# Patient Record
Sex: MALE
[De-identification: names, ages, dates, MRNs, and addresses within clinical notes are randomized per-mention and may not be internally consistent; named-entity substitution may affect disease eponyms.]

---

## 2022-07-30 ENCOUNTER — TELEPHONE ENCOUNTER (OUTPATIENT)
Age: 87
End: 2022-07-30

## 2022-07-31 ENCOUNTER — TELEPHONE ENCOUNTER (OUTPATIENT)
Age: 87
End: 2022-07-31

## 2023-09-03 PROBLEM — I10 ESSENTIAL HYPERTENSION: Status: ACTIVE | Noted: 2023-09-03

## 2023-09-03 PROBLEM — G47.00 INSOMNIA: Status: ACTIVE | Noted: 2023-09-03

## 2023-09-03 PROBLEM — F34.1 DYSTHYMIA: Status: ACTIVE | Noted: 2023-09-03

## 2023-09-03 PROBLEM — E55.9 VITAMIN D DEFICIENCY: Status: ACTIVE | Noted: 2023-09-03

## 2023-09-03 PROBLEM — H40.9 GLAUCOMA: Status: ACTIVE | Noted: 2023-09-03

## 2023-09-03 PROBLEM — D64.9 ANEMIA: Status: ACTIVE | Noted: 2023-09-03

## 2023-09-03 PROBLEM — G62.9 NEUROPATHY: Status: ACTIVE | Noted: 2023-09-03

## 2023-09-03 RX ORDER — AMLODIPINE BESYLATE 5 MG/1
2.5 TABLET ORAL DAILY
COMMUNITY
End: 2024-02-12 | Stop reason: ALTCHOICE

## 2023-09-03 RX ORDER — CHOLECALCIFEROL (VITAMIN D3) 50 MCG
2000 TABLET ORAL DAILY
COMMUNITY

## 2023-09-03 RX ORDER — LANOLIN ALCOHOL/MO/W.PET/CERES
1000 CREAM (GRAM) TOPICAL DAILY
COMMUNITY
Start: 2021-05-06

## 2023-09-03 RX ORDER — TIMOLOL MALEATE 5 MG/ML
SOLUTION/ DROPS OPHTHALMIC
COMMUNITY
Start: 2023-01-10

## 2023-09-03 RX ORDER — NAPROXEN SODIUM 220 MG/1
81 TABLET, FILM COATED ORAL
COMMUNITY
End: 2023-12-18 | Stop reason: ALTCHOICE

## 2023-12-11 ENCOUNTER — LAB (OUTPATIENT)
Dept: LAB | Facility: LAB | Age: 88
End: 2023-12-11
Payer: MEDICARE

## 2023-12-11 DIAGNOSIS — E53.8 DEFICIENCY OF OTHER SPECIFIED B GROUP VITAMINS: ICD-10-CM

## 2023-12-11 DIAGNOSIS — I10 ESSENTIAL (PRIMARY) HYPERTENSION: Primary | ICD-10-CM

## 2023-12-11 DIAGNOSIS — E55.9 VITAMIN D DEFICIENCY, UNSPECIFIED: ICD-10-CM

## 2023-12-11 LAB
25(OH)D3 SERPL-MCNC: 25 NG/ML (ref 31–100)
ALBUMIN SERPL-MCNC: 3.9 G/DL (ref 3.5–5)
ALP BLD-CCNC: 99 U/L (ref 35–125)
ALT SERPL-CCNC: 10 U/L (ref 5–40)
ANION GAP SERPL CALC-SCNC: 11 MMOL/L
AST SERPL-CCNC: 20 U/L (ref 5–40)
BASOPHILS # BLD AUTO: 0.09 X10*3/UL (ref 0–0.1)
BASOPHILS NFR BLD AUTO: 1.1 %
BILIRUB SERPL-MCNC: 1.4 MG/DL (ref 0.1–1.2)
BUN SERPL-MCNC: 16 MG/DL (ref 8–25)
CALCIUM SERPL-MCNC: 10.1 MG/DL (ref 8.5–10.4)
CHLORIDE SERPL-SCNC: 101 MMOL/L (ref 97–107)
CHOLEST SERPL-MCNC: 162 MG/DL (ref 133–200)
CHOLEST/HDLC SERPL: 2.7 {RATIO}
CO2 SERPL-SCNC: 28 MMOL/L (ref 24–31)
CREAT SERPL-MCNC: 0.8 MG/DL (ref 0.4–1.6)
EOSINOPHIL # BLD AUTO: 0.33 X10*3/UL (ref 0–0.4)
EOSINOPHIL NFR BLD AUTO: 4.1 %
ERYTHROCYTE [DISTWIDTH] IN BLOOD BY AUTOMATED COUNT: 13.9 % (ref 11.5–14.5)
GFR SERPL CREATININE-BSD FRML MDRD: 80 ML/MIN/1.73M*2
GLUCOSE SERPL-MCNC: 94 MG/DL (ref 65–99)
HCT VFR BLD AUTO: 48.4 % (ref 41–52)
HDLC SERPL-MCNC: 60 MG/DL
HGB BLD-MCNC: 15.7 G/DL (ref 13.5–17.5)
IMM GRANULOCYTES # BLD AUTO: 0.02 X10*3/UL (ref 0–0.5)
IMM GRANULOCYTES NFR BLD AUTO: 0.2 % (ref 0–0.9)
LDLC SERPL CALC-MCNC: 83 MG/DL (ref 65–130)
LYMPHOCYTES # BLD AUTO: 2.48 X10*3/UL (ref 0.8–3)
LYMPHOCYTES NFR BLD AUTO: 30.7 %
MCH RBC QN AUTO: 31.5 PG (ref 26–34)
MCHC RBC AUTO-ENTMCNC: 32.4 G/DL (ref 32–36)
MCV RBC AUTO: 97 FL (ref 80–100)
MONOCYTES # BLD AUTO: 0.94 X10*3/UL (ref 0.05–0.8)
MONOCYTES NFR BLD AUTO: 11.6 %
NEUTROPHILS # BLD AUTO: 4.23 X10*3/UL (ref 1.6–5.5)
NEUTROPHILS NFR BLD AUTO: 52.3 %
NRBC BLD-RTO: 0 /100 WBCS (ref 0–0)
PLATELET # BLD AUTO: 208 X10*3/UL (ref 150–450)
POTASSIUM SERPL-SCNC: 4.4 MMOL/L (ref 3.4–5.1)
PROT SERPL-MCNC: 6.8 G/DL (ref 5.9–7.9)
RBC # BLD AUTO: 4.98 X10*6/UL (ref 4.5–5.9)
SODIUM SERPL-SCNC: 140 MMOL/L (ref 133–145)
TRIGL SERPL-MCNC: 97 MG/DL (ref 40–150)
TSH SERPL DL<=0.05 MIU/L-ACNC: 3.05 MIU/L (ref 0.27–4.2)
VIT B12 SERPL-MCNC: 867 PG/ML (ref 211–946)
WBC # BLD AUTO: 8.1 X10*3/UL (ref 4.4–11.3)

## 2023-12-11 PROCEDURE — 36415 COLL VENOUS BLD VENIPUNCTURE: CPT

## 2023-12-11 PROCEDURE — 82306 VITAMIN D 25 HYDROXY: CPT

## 2023-12-11 PROCEDURE — 85025 COMPLETE CBC W/AUTO DIFF WBC: CPT

## 2023-12-11 PROCEDURE — 80061 LIPID PANEL: CPT

## 2023-12-11 PROCEDURE — 80053 COMPREHEN METABOLIC PANEL: CPT

## 2023-12-11 PROCEDURE — 82607 VITAMIN B-12: CPT

## 2023-12-11 PROCEDURE — 84443 ASSAY THYROID STIM HORMONE: CPT

## 2023-12-14 ASSESSMENT — ENCOUNTER SYMPTOMS
SHORTNESS OF BREATH: 0
NAUSEA: 0
APPETITE CHANGE: 0
CHILLS: 0
FEVER: 0
HEADACHES: 0
COUGH: 0
DIARRHEA: 0
VOMITING: 0
ABDOMINAL PAIN: 0

## 2023-12-15 NOTE — PROGRESS NOTES
[facility]: [facility]  PROGRESS NOTE      Kolton Acosta is a 89 y.o. male that is presenting today for No chief complaint on file..    Assessment/Plan   Diagnoses and all orders for this visit:  Essential hypertension  Insomnia, unspecified type  Neuropathy  Vitamin D deficiency  Vitamin B12 deficiency    HR may be too low given his age but he seems totally asymptomatic so will not change meds recommenced by cardiology.  He does need to get the echo done and that is scheduled for January.  No issues with the AC at this time.    Subjective   HPI  89 y.o. male here for follow up.  Went into AF after getting vaccines, admitted at [facility].  Anticoagulated on the Eliquis.  On metoprolol with HR in 50s or 40s and asymptomatic, amlodipine reduced.  Followed up with cardiology and has echo ordered in January.  Feels fine without symptoms of angina nor CHF.  No dizziness or lightheadedness.    Review of Systems   Constitutional:  Negative for appetite change, chills and fever.   Respiratory:  Negative for cough and shortness of breath.    Cardiovascular:  Negative for chest pain.   Gastrointestinal:  Negative for abdominal pain, diarrhea, nausea and vomiting.   Neurological:  Negative for headaches.   All other systems reviewed and are negative.     Objective   There were no vitals filed for this visit.   There is no height or weight on file to calculate BMI.  Physical Exam  Vitals reviewed.   Constitutional:       Appearance: Normal appearance.   Cardiovascular:      Rate and Rhythm: Bradycardia present. Rhythm irregular.      Heart sounds: No murmur heard.  Pulmonary:      Breath sounds: Normal breath sounds. No wheezing, rhonchi or rales.   Musculoskeletal:      Right lower leg: No edema.      Left lower leg: No edema.       Diagnostic Results   Lab Results   Component Value Date    GLUCOSE 94 12/11/2023    CALCIUM 10.1 12/11/2023     12/11/2023    K 4.4 12/11/2023    CO2 28 12/11/2023      "12/11/2023    BUN 16 12/11/2023    CREATININE 0.80 12/11/2023     Lab Results   Component Value Date    ALT 10 12/11/2023    AST 20 12/11/2023    ALKPHOS 99 12/11/2023    BILITOT 1.4 (H) 12/11/2023     Lab Results   Component Value Date    WBC 8.1 12/11/2023    HGB 15.7 12/11/2023    HCT 48.4 12/11/2023    MCV 97 12/11/2023     12/11/2023     Lab Results   Component Value Date    CHOL 162 12/11/2023    CHOL 168 11/09/2022    CHOL 197 11/04/2021     Lab Results   Component Value Date    HDL 60.0 12/11/2023    HDL 53 11/09/2022    HDL 52 11/04/2021     Lab Results   Component Value Date    LDLCALC 83 12/11/2023    LDLCALC 94 11/09/2022    LDLCALC 120 11/04/2021     Lab Results   Component Value Date    TRIG 97 12/11/2023    TRIG 106 11/09/2022    TRIG 125 11/04/2021     No components found for: \"CHOLHDL\"  Lab Results   Component Value Date    HGBA1C 5.6 08/13/2019     Other labs not included in the list above were reviewed either before or during this encounter.    History    No past medical history on file.  No past surgical history on file.  Family History   Problem Relation Name Age of Onset    Cancer Mother      Colon cancer Mother      Heart attack Father       Social History     Socioeconomic History    Marital status:      Spouse name: Not on file    Number of children: Not on file    Years of education: Not on file    Highest education level: Not on file   Occupational History    Not on file   Tobacco Use    Smoking status: Not on file    Smokeless tobacco: Not on file   Substance and Sexual Activity    Alcohol use: Not on file    Drug use: Not on file    Sexual activity: Not on file   Other Topics Concern    Not on file   Social History Narrative    Not on file     Social Determinants of Health     Financial Resource Strain: Not on file   Food Insecurity: Not on file   Transportation Needs: Not on file   Physical Activity: Not on file   Stress: Not on file   Social Connections: Not on file "   Intimate Partner Violence: Not on file   Housing Stability: Not on file     Not on File  Current Outpatient Medications on File Prior to Visit   Medication Sig Dispense Refill    amLODIPine (Norvasc) 5 mg tablet Take 1 tablet (5 mg) by mouth once daily.      aspirin 81 mg chewable tablet Chew 1 tablet (81 mg). Three days per week      cholecalciferol (Vitamin D-3) 50 MCG (2000 UT) tablet Take 1 tablet (2,000 Units) by mouth once daily.      cyanocobalamin (Vitamin B-12) 1,000 mcg tablet Take 1 tablet (1,000 mcg) by mouth once daily.      timolol (Timoptic) 0.5 % ophthalmic solution        No current facility-administered medications on file prior to visit.     Immunization History   Administered Date(s) Administered    Flu vaccine, quadrivalent, high-dose, preservative free, age 65y+ (FLUZONE) 10/29/2020, 09/26/2021    Influenza, High Dose Seasonal, Preservative Free 11/18/2015, 11/10/2016, 10/30/2017, 10/24/2018, 10/14/2019    Influenza, Seasonal, Quadrivalent, Adjuvanted 11/01/2022    Influenza, seasonal, injectable 09/26/2014    Moderna SARS-CoV-2 Vaccination 01/20/2021, 02/17/2021    Pfizer COVID-19 vaccine, bivalent, age 12 years and older (30 mcg/0.3 mL) 12/14/2022    Pfizer Purple Cap SARS-CoV-2 11/01/2021    Pneumococcal conjugate vaccine, 13-valent (PREVNAR 13) 08/24/2015    Pneumococcal polysaccharide vaccine, 23-valent, age 2 years and older (PNEUMOVAX 23) 08/12/2014     Patient's medical history was reviewed and updated either before or during this encounter.       Ajay Griffith MD

## 2023-12-18 ENCOUNTER — OFFICE VISIT (OUTPATIENT)
Dept: PRIMARY CARE | Facility: CLINIC | Age: 88
End: 2023-12-18
Payer: MEDICARE

## 2023-12-18 VITALS
SYSTOLIC BLOOD PRESSURE: 122 MMHG | HEART RATE: 45 BPM | OXYGEN SATURATION: 93 % | TEMPERATURE: 97.2 F | WEIGHT: 162 LBS | DIASTOLIC BLOOD PRESSURE: 60 MMHG | BODY MASS INDEX: 22.59 KG/M2

## 2023-12-18 DIAGNOSIS — G47.00 INSOMNIA, UNSPECIFIED TYPE: ICD-10-CM

## 2023-12-18 DIAGNOSIS — I10 ESSENTIAL HYPERTENSION: Primary | ICD-10-CM

## 2023-12-18 DIAGNOSIS — E55.9 VITAMIN D DEFICIENCY: ICD-10-CM

## 2023-12-18 DIAGNOSIS — E53.8 VITAMIN B12 DEFICIENCY: ICD-10-CM

## 2023-12-18 DIAGNOSIS — G62.9 NEUROPATHY: ICD-10-CM

## 2023-12-18 PROCEDURE — 3074F SYST BP LT 130 MM HG: CPT | Performed by: INTERNAL MEDICINE

## 2023-12-18 PROCEDURE — 99214 OFFICE O/P EST MOD 30 MIN: CPT | Performed by: INTERNAL MEDICINE

## 2023-12-18 PROCEDURE — 1159F MED LIST DOCD IN RCRD: CPT | Performed by: INTERNAL MEDICINE

## 2023-12-18 PROCEDURE — 1125F AMNT PAIN NOTED PAIN PRSNT: CPT | Performed by: INTERNAL MEDICINE

## 2023-12-18 PROCEDURE — 3078F DIAST BP <80 MM HG: CPT | Performed by: INTERNAL MEDICINE

## 2023-12-18 PROCEDURE — 1036F TOBACCO NON-USER: CPT | Performed by: INTERNAL MEDICINE

## 2023-12-18 PROCEDURE — 1160F RVW MEDS BY RX/DR IN RCRD: CPT | Performed by: INTERNAL MEDICINE

## 2023-12-18 RX ORDER — METOPROLOL SUCCINATE 25 MG/1
12.5 TABLET, EXTENDED RELEASE ORAL
COMMUNITY

## 2023-12-18 ASSESSMENT — ENCOUNTER SYMPTOMS
LOSS OF SENSATION IN FEET: 0
DEPRESSION: 0
OCCASIONAL FEELINGS OF UNSTEADINESS: 1

## 2023-12-18 ASSESSMENT — PAIN SCALES - GENERAL: PAINLEVEL: 7

## 2023-12-18 ASSESSMENT — PATIENT HEALTH QUESTIONNAIRE - PHQ9
SUM OF ALL RESPONSES TO PHQ9 QUESTIONS 1 AND 2: 0
2. FEELING DOWN, DEPRESSED OR HOPELESS: NOT AT ALL
1. LITTLE INTEREST OR PLEASURE IN DOING THINGS: NOT AT ALL

## 2024-02-02 ENCOUNTER — OFFICE VISIT (OUTPATIENT)
Dept: PRIMARY CARE | Facility: CLINIC | Age: 89
End: 2024-02-02
Payer: MEDICARE

## 2024-02-02 VITALS
OXYGEN SATURATION: 95 % | SYSTOLIC BLOOD PRESSURE: 124 MMHG | WEIGHT: 159 LBS | DIASTOLIC BLOOD PRESSURE: 80 MMHG | BODY MASS INDEX: 22.18 KG/M2 | HEART RATE: 48 BPM

## 2024-02-02 DIAGNOSIS — I48.92 ATRIAL FIBRILLATION AND FLUTTER (MULTI): ICD-10-CM

## 2024-02-02 DIAGNOSIS — I48.91 ATRIAL FIBRILLATION AND FLUTTER (MULTI): ICD-10-CM

## 2024-02-02 DIAGNOSIS — L03.213 PRESEPTAL CELLULITIS: Primary | ICD-10-CM

## 2024-02-02 PROCEDURE — 99214 OFFICE O/P EST MOD 30 MIN: CPT | Performed by: INTERNAL MEDICINE

## 2024-02-02 PROCEDURE — 1036F TOBACCO NON-USER: CPT | Performed by: INTERNAL MEDICINE

## 2024-02-02 PROCEDURE — 1126F AMNT PAIN NOTED NONE PRSNT: CPT | Performed by: INTERNAL MEDICINE

## 2024-02-02 PROCEDURE — 3074F SYST BP LT 130 MM HG: CPT | Performed by: INTERNAL MEDICINE

## 2024-02-02 PROCEDURE — 1159F MED LIST DOCD IN RCRD: CPT | Performed by: INTERNAL MEDICINE

## 2024-02-02 PROCEDURE — 3079F DIAST BP 80-89 MM HG: CPT | Performed by: INTERNAL MEDICINE

## 2024-02-02 RX ORDER — AMOXICILLIN AND CLAVULANATE POTASSIUM 875; 125 MG/1; MG/1
1 TABLET, FILM COATED ORAL 2 TIMES DAILY
Qty: 20 TABLET | Refills: 0 | Status: SHIPPED | OUTPATIENT
Start: 2024-02-02 | End: 2024-02-12

## 2024-02-02 RX ORDER — TOBRAMYCIN 3 MG/ML
1 SOLUTION/ DROPS OPHTHALMIC 4 TIMES DAILY
Qty: 5 ML | Refills: 0 | Status: SHIPPED | OUTPATIENT
Start: 2024-02-02 | End: 2024-02-16

## 2024-02-02 ASSESSMENT — PAIN SCALES - GENERAL: PAINLEVEL: 0-NO PAIN

## 2024-02-02 ASSESSMENT — PATIENT HEALTH QUESTIONNAIRE - PHQ9
2. FEELING DOWN, DEPRESSED OR HOPELESS: NOT AT ALL
1. LITTLE INTEREST OR PLEASURE IN DOING THINGS: NOT AT ALL
SUM OF ALL RESPONSES TO PHQ9 QUESTIONS 1 AND 2: 0

## 2024-02-02 ASSESSMENT — ENCOUNTER SYMPTOMS
ABDOMINAL PAIN: 0
OCCASIONAL FEELINGS OF UNSTEADINESS: 1
DEPRESSION: 0
LOSS OF SENSATION IN FEET: 0
SHORTNESS OF BREATH: 0
FEVER: 0

## 2024-02-02 NOTE — PROGRESS NOTES
[facility]: [facility]  PROGRESS NOTE      Kolton Acosta is a 89 y.o. male that is presenting today for eye complaint.    Assessment/Plan   Diagnoses and all orders for this visit:  Preseptal cellulitis  -     tobramycin (Tobrex) 0.3 % ophthalmic solution; Administer 1 drop into the left eye 4 times a day for 14 days.  -     amoxicillin-pot clavulanate (Augmentin) 875-125 mg tablet; Take 1 tablet (875 mg) by mouth 2 times a day for 10 days.  Atrial fibrillation and flutter (CMS/HCC)    Will treat as a preseptal cellulitis without any signs to suggest orbital or postseptal pathology on exam or by history.  This has been slowly worsening over the past week, will start antibiotics I did discuss reasons that they need to seek care in the emergency department for CT scan.  Otherwise I will see him back in 1 week.    Subjective   HPI  Over the past week on the left cheek just under the eye he has had an area that has become scabbed and is draining.  Also there is some discharge from the eye.  He has no pain with eye movement and no visual disturbance at all.    Review of Systems   Constitutional:  Negative for fever.   Respiratory:  Negative for shortness of breath.    Cardiovascular:  Negative for chest pain.   Gastrointestinal:  Negative for abdominal pain.   All other systems reviewed and are negative.     Objective   Vitals:    02/02/24 1055   BP: 124/80   Pulse: (!) 48   SpO2: 95%      Body mass index is 22.18 kg/m².  Physical Exam  Vitals reviewed.   Constitutional:       Appearance: Normal appearance.   Eyes:     Cardiovascular:      Rate and Rhythm: Normal rate and regular rhythm.      Heart sounds: No murmur heard.  Pulmonary:      Breath sounds: Normal breath sounds. No wheezing, rhonchi or rales.   Musculoskeletal:      Right lower leg: No edema.      Left lower leg: No edema.       Just inferior and lateral to the left lower eyelid there is a scabbed area with some minor purulence, no  "eb erythema or cellulitis.  Extraocular movements appear to be intact, there is no pain.  The eye is not injected.  The vision is normal.    Diagnostic Results   Lab Results   Component Value Date    GLUCOSE 94 12/11/2023    CALCIUM 10.1 12/11/2023     12/11/2023    K 4.4 12/11/2023    CO2 28 12/11/2023     12/11/2023    BUN 16 12/11/2023    CREATININE 0.80 12/11/2023     Lab Results   Component Value Date    ALT 10 12/11/2023    AST 20 12/11/2023    ALKPHOS 99 12/11/2023    BILITOT 1.4 (H) 12/11/2023     Lab Results   Component Value Date    WBC 8.1 12/11/2023    HGB 15.7 12/11/2023    HCT 48.4 12/11/2023    MCV 97 12/11/2023     12/11/2023     Lab Results   Component Value Date    CHOL 162 12/11/2023    CHOL 168 11/09/2022    CHOL 197 11/04/2021     Lab Results   Component Value Date    HDL 60.0 12/11/2023    HDL 53 11/09/2022    HDL 52 11/04/2021     Lab Results   Component Value Date    LDLCALC 83 12/11/2023    LDLCALC 94 11/09/2022    LDLCALC 120 11/04/2021     Lab Results   Component Value Date    TRIG 97 12/11/2023    TRIG 106 11/09/2022    TRIG 125 11/04/2021     No components found for: \"CHOLHDL\"  Lab Results   Component Value Date    HGBA1C 5.6 08/13/2019     Other labs not included in the list above were reviewed either before or during this encounter.    History    Past Medical History:   Diagnosis Date    A-fib (CMS/HCC)      Past Surgical History:   Procedure Laterality Date    WISDOM TOOTH EXTRACTION  01/2024    12 teeth removed     Family History   Problem Relation Name Age of Onset    Cancer Mother      Colon cancer Mother      Heart attack Father       Social History     Socioeconomic History    Marital status:      Spouse name: Not on file    Number of children: Not on file    Years of education: Not on file    Highest education level: Not on file   Occupational History    Not on file   Tobacco Use    Smoking status: Never    Smokeless tobacco: Never   Vaping Use    " Vaping Use: Never used   Substance and Sexual Activity    Alcohol use: Never    Drug use: Never    Sexual activity: Not on file   Other Topics Concern    Not on file   Social History Narrative    Not on file     Social Determinants of Health     Financial Resource Strain: Not on file   Food Insecurity: Not on file   Transportation Needs: Not on file   Physical Activity: Not on file   Stress: Not on file   Social Connections: Not on file   Intimate Partner Violence: Not on file   Housing Stability: Not on file     No Known Allergies  Current Outpatient Medications on File Prior to Visit   Medication Sig Dispense Refill    amLODIPine (Norvasc) 5 mg tablet Take 0.5 tablets (2.5 mg) by mouth once daily.      apixaban (Eliquis) 5 mg tablet Take 1 tablet (5 mg) by mouth 2 times a day.      cholecalciferol (Vitamin D-3) 50 MCG (2000 UT) tablet Take 1 tablet (2,000 Units) by mouth once daily.      cyanocobalamin (Vitamin B-12) 1,000 mcg tablet Take 1 tablet (1,000 mcg) by mouth once daily.      metoprolol succinate XL (Toprol-XL) 25 mg 24 hr tablet Take 0.5 tablets (12.5 mg) by mouth. Only given if blood pressure top number is not less than 100 and heart rate not less than 50      timolol (Timoptic) 0.5 % ophthalmic solution        No current facility-administered medications on file prior to visit.     Immunization History   Administered Date(s) Administered    Flu vaccine, quadrivalent, high-dose, preservative free, age 65y+ (FLUZONE) 10/29/2020, 09/26/2021, 10/28/2023    Influenza, High Dose Seasonal, Preservative Free 11/18/2015, 11/10/2016, 10/30/2017, 10/24/2018, 10/14/2019    Influenza, Seasonal, Quadrivalent, Adjuvanted 11/01/2022    Influenza, seasonal, injectable 09/26/2014    Moderna SARS-CoV-2 Vaccination 01/20/2021, 02/17/2021    Pfizer COVID-19 vaccine, Fall 2023, 12 years and older, (30mcg/0.3mL) 10/28/2023    Pfizer COVID-19 vaccine, bivalent, age 12 years and older (30 mcg/0.3 mL) 12/14/2022    Pfizer  Purple Cap SARS-CoV-2 11/01/2021    Pneumococcal conjugate vaccine, 13-valent (PREVNAR 13) 08/24/2015    Pneumococcal polysaccharide vaccine, 23-valent, age 2 years and older (PNEUMOVAX 23) 08/12/2014     Patient's medical history was reviewed and updated either before or during this encounter.       Ajay Griffith MD

## 2024-02-09 PROBLEM — I10 PRIMARY HYPERTENSION: Status: ACTIVE | Noted: 2022-11-09

## 2024-02-09 PROBLEM — R94.5 ABNORMAL RESULTS OF LIVER FUNCTION STUDIES: Status: ACTIVE | Noted: 2024-02-09

## 2024-02-09 PROBLEM — L03.213 PRESEPTAL CELLULITIS: Status: ACTIVE | Noted: 2024-02-09

## 2024-02-09 PROBLEM — G47.00 INSOMNIA, UNSPECIFIED: Status: ACTIVE | Noted: 2024-02-09

## 2024-02-09 PROBLEM — G62.9 POLYNEUROPATHY, UNSPECIFIED: Status: ACTIVE | Noted: 2024-02-09

## 2024-02-09 PROBLEM — E53.8 COBALAMIN DEFICIENCY: Status: ACTIVE | Noted: 2022-11-09

## 2024-02-12 ENCOUNTER — OFFICE VISIT (OUTPATIENT)
Dept: PRIMARY CARE | Facility: CLINIC | Age: 89
End: 2024-02-12
Payer: MEDICARE

## 2024-02-12 VITALS
TEMPERATURE: 96.1 F | OXYGEN SATURATION: 97 % | HEIGHT: 71 IN | DIASTOLIC BLOOD PRESSURE: 74 MMHG | HEART RATE: 59 BPM | SYSTOLIC BLOOD PRESSURE: 130 MMHG | WEIGHT: 163 LBS | BODY MASS INDEX: 22.82 KG/M2

## 2024-02-12 DIAGNOSIS — I48.92 ATRIAL FIBRILLATION AND FLUTTER (MULTI): ICD-10-CM

## 2024-02-12 DIAGNOSIS — I48.91 ATRIAL FIBRILLATION AND FLUTTER (MULTI): ICD-10-CM

## 2024-02-12 DIAGNOSIS — I10 ESSENTIAL HYPERTENSION: ICD-10-CM

## 2024-02-12 DIAGNOSIS — L03.213 PRESEPTAL CELLULITIS: Primary | ICD-10-CM

## 2024-02-12 PROCEDURE — 1036F TOBACCO NON-USER: CPT | Performed by: INTERNAL MEDICINE

## 2024-02-12 PROCEDURE — 99213 OFFICE O/P EST LOW 20 MIN: CPT | Performed by: INTERNAL MEDICINE

## 2024-02-12 PROCEDURE — 1126F AMNT PAIN NOTED NONE PRSNT: CPT | Performed by: INTERNAL MEDICINE

## 2024-02-12 PROCEDURE — 3078F DIAST BP <80 MM HG: CPT | Performed by: INTERNAL MEDICINE

## 2024-02-12 PROCEDURE — 1159F MED LIST DOCD IN RCRD: CPT | Performed by: INTERNAL MEDICINE

## 2024-02-12 PROCEDURE — 3075F SYST BP GE 130 - 139MM HG: CPT | Performed by: INTERNAL MEDICINE

## 2024-02-12 ASSESSMENT — ENCOUNTER SYMPTOMS
FEVER: 0
ABDOMINAL PAIN: 0
LOSS OF SENSATION IN FEET: 0
OCCASIONAL FEELINGS OF UNSTEADINESS: 1
SHORTNESS OF BREATH: 0
DEPRESSION: 0

## 2024-02-12 ASSESSMENT — LIFESTYLE VARIABLES
HOW OFTEN DURING THE LAST YEAR HAVE YOU FOUND THAT YOU WERE NOT ABLE TO STOP DRINKING ONCE YOU HAD STARTED: NEVER
HAS A RELATIVE, FRIEND, DOCTOR, OR ANOTHER HEALTH PROFESSIONAL EXPRESSED CONCERN ABOUT YOUR DRINKING OR SUGGESTED YOU CUT DOWN: NO
AUDIT-C TOTAL SCORE: 0
HOW OFTEN DURING THE LAST YEAR HAVE YOU NEEDED AN ALCOHOLIC DRINK FIRST THING IN THE MORNING TO GET YOURSELF GOING AFTER A NIGHT OF HEAVY DRINKING: NEVER
HOW OFTEN DURING THE LAST YEAR HAVE YOU BEEN UNABLE TO REMEMBER WHAT HAPPENED THE NIGHT BEFORE BECAUSE YOU HAD BEEN DRINKING: NEVER
HOW OFTEN DO YOU HAVE A DRINK CONTAINING ALCOHOL: NEVER
SKIP TO QUESTIONS 9-10: 1
HOW MANY STANDARD DRINKS CONTAINING ALCOHOL DO YOU HAVE ON A TYPICAL DAY: PATIENT DOES NOT DRINK
HAVE YOU OR SOMEONE ELSE BEEN INJURED AS A RESULT OF YOUR DRINKING: NO
HOW OFTEN DO YOU HAVE SIX OR MORE DRINKS ON ONE OCCASION: NEVER
HOW OFTEN DURING THE LAST YEAR HAVE YOU HAD A FEELING OF GUILT OR REMORSE AFTER DRINKING: NEVER
HOW OFTEN DURING THE LAST YEAR HAVE YOU FAILED TO DO WHAT WAS NORMALLY EXPECTED FROM YOU BECAUSE OF DRINKING: NEVER
AUDIT TOTAL SCORE: 0

## 2024-02-12 ASSESSMENT — PAIN SCALES - GENERAL: PAINLEVEL: 0-NO PAIN

## 2024-02-12 NOTE — PROGRESS NOTES
Yeah if I routed I did [facility]: [facility]  PROGRESS NOTE      Kolton Acosta is a 89 y.o. male that is presenting today for Follow-up.    Assessment/Plan   Diagnoses and all orders for this visit:  Preseptal cellulitis  Atrial fibrillation and flutter (CMS/HCC)  Essential hypertension    Subjective   HPI  Following up on the left facial cellulitis which has improved greatly.    Review of Systems   Constitutional:  Negative for fever.   Respiratory:  Negative for shortness of breath.    Cardiovascular:  Negative for chest pain.   Gastrointestinal:  Negative for abdominal pain.   All other systems reviewed and are negative.     Objective   Vitals:    02/12/24 1521   BP: 130/74   Pulse: 59   Temp: 35.6 °C (96.1 °F)   SpO2: 97%      Body mass index is 22.73 kg/m².  Physical Exam  The left face and eye is completely clear of infection the conjunctive is clear as well.  No pain with extraocular movements.    Diagnostic Results   Lab Results   Component Value Date    GLUCOSE 94 12/11/2023    CALCIUM 10.1 12/11/2023     12/11/2023    K 4.4 12/11/2023    CO2 28 12/11/2023     12/11/2023    BUN 16 12/11/2023    CREATININE 0.80 12/11/2023     Lab Results   Component Value Date    ALT 10 12/11/2023    AST 20 12/11/2023    ALKPHOS 99 12/11/2023    BILITOT 1.4 (H) 12/11/2023     Lab Results   Component Value Date    WBC 8.1 12/11/2023    HGB 15.7 12/11/2023    HCT 48.4 12/11/2023    MCV 97 12/11/2023     12/11/2023     Lab Results   Component Value Date    CHOL 162 12/11/2023    CHOL 168 11/09/2022    CHOL 197 11/04/2021     Lab Results   Component Value Date    HDL 60.0 12/11/2023    HDL 53 11/09/2022    HDL 52 11/04/2021     Lab Results   Component Value Date    LDLCALC 83 12/11/2023    LDLCALC 94 11/09/2022    LDLCALC 120 11/04/2021     Lab Results   Component Value Date    TRIG 97 12/11/2023    TRIG 106 11/09/2022    TRIG 125 11/04/2021     No components found for:  "\"CHOLHDL\"  Lab Results   Component Value Date    HGBA1C 5.6 08/13/2019     Other labs not included in the list above were reviewed either before or during this encounter.    History    Past Medical History:   Diagnosis Date    A-fib (CMS/HCC)      Past Surgical History:   Procedure Laterality Date    WISDOM TOOTH EXTRACTION  01/2024    12 teeth removed     Family History   Problem Relation Name Age of Onset    Cancer Mother      Colon cancer Mother      Heart attack Father       Social History     Socioeconomic History    Marital status:      Spouse name: Not on file    Number of children: Not on file    Years of education: Not on file    Highest education level: Not on file   Occupational History    Not on file   Tobacco Use    Smoking status: Never    Smokeless tobacco: Never   Vaping Use    Vaping Use: Never used   Substance and Sexual Activity    Alcohol use: Never    Drug use: Never    Sexual activity: Not on file   Other Topics Concern    Not on file   Social History Narrative    Not on file     Social Determinants of Health     Financial Resource Strain: Not on file   Food Insecurity: Not on file   Transportation Needs: Not on file   Physical Activity: Not on file   Stress: Not on file   Social Connections: Not on file   Intimate Partner Violence: Not on file   Housing Stability: Not on file     No Known Allergies  Current Outpatient Medications on File Prior to Visit   Medication Sig Dispense Refill    amoxicillin-pot clavulanate (Augmentin) 875-125 mg tablet Take 1 tablet (875 mg) by mouth 2 times a day for 10 days. 20 tablet 0    apixaban (Eliquis) 5 mg tablet Take 1 tablet (5 mg) by mouth 2 times a day.      cholecalciferol (Vitamin D-3) 50 MCG (2000 UT) tablet Take 1 tablet (2,000 Units) by mouth once daily.      cyanocobalamin (Vitamin B-12) 1,000 mcg tablet Take 1 tablet (1,000 mcg) by mouth once daily.      metoprolol succinate XL (Toprol-XL) 25 mg 24 hr tablet Take 0.5 tablets (12.5 mg) by " mouth. Only given if blood pressure top number is not less than 100 and heart rate not less than 50      timolol (Timoptic) 0.5 % ophthalmic solution       tobramycin (Tobrex) 0.3 % ophthalmic solution Administer 1 drop into the left eye 4 times a day for 14 days. 5 mL 0    [DISCONTINUED] amLODIPine (Norvasc) 5 mg tablet Take 0.5 tablets (2.5 mg) by mouth once daily.       No current facility-administered medications on file prior to visit.     Immunization History   Administered Date(s) Administered    Flu vaccine, quadrivalent, high-dose, preservative free, age 65y+ (FLUZONE) 10/29/2020, 09/26/2021, 10/28/2023    Influenza, High Dose Seasonal, Preservative Free 11/18/2015, 11/10/2016, 10/30/2017, 10/24/2018, 10/14/2019    Influenza, Seasonal, Quadrivalent, Adjuvanted 11/01/2022    Influenza, seasonal, injectable 09/26/2014    Moderna SARS-CoV-2 Vaccination 01/20/2021, 02/17/2021    Pfizer COVID-19 vaccine, Fall 2023, 12 years and older, (30mcg/0.3mL) 10/28/2023    Pfizer COVID-19 vaccine, bivalent, age 12 years and older (30 mcg/0.3 mL) 12/14/2022    Pfizer Purple Cap SARS-CoV-2 11/01/2021    Pneumococcal conjugate vaccine, 13-valent (PREVNAR 13) 08/24/2015    Pneumococcal polysaccharide vaccine, 23-valent, age 2 years and older (PNEUMOVAX 23) 08/12/2014     Patient's medical history was reviewed and updated either before or during this encounter.       Ajay Griffith MD

## 2024-02-19 ENCOUNTER — APPOINTMENT (OUTPATIENT)
Dept: PRIMARY CARE | Facility: CLINIC | Age: 89
End: 2024-02-19
Payer: MEDICARE

## 2024-04-17 ENCOUNTER — DOCUMENTATION (OUTPATIENT)
Dept: PRIMARY CARE | Facility: CLINIC | Age: 89
End: 2024-04-17
Payer: MEDICARE

## 2024-04-17 ENCOUNTER — PATIENT OUTREACH (OUTPATIENT)
Dept: PRIMARY CARE | Facility: CLINIC | Age: 89
End: 2024-04-17
Payer: MEDICARE

## 2024-04-17 NOTE — PROGRESS NOTES
Discharge Facility: [facility]     Discharge Diagnosis:    Syncope and collapse - Primary    Fall, initial encounter    Hypomagnesemia   Disorders of magnesium metabolism    Elevated troponin   Other abnormal blood chemistry    Generalized weakness   Other malaise and fatigue    Near syncope   Syncope and collapse    Syncope and collapse    Paroxysmal atrial fibrillation (HCC)   Atrial fibrillation    Advanced care planning/counseling discussion   Other specified counseling    Atrial fibrillation (HCC)   Atrial fibrillation    Essential hypertension   Unspecified essential hypertension    Nonrheumatic aortic valve stenosis   Aortic valve disorders    Age-related osteoporosis with current pathol fracture of left femur, with routine healing, subsequent encounter      Admission Date: 4/14/2024   Discharge Date:  4/16/2024     PCP Appointment Date: Tasked to office     Specialist Appointment Date:     05/16/2024 9:00 AM  Cardio STANLEY SEE [facility] [phone]  06/13/2024 3:00 PM  Cardio PARVIN HARRIS [facility] [phone]    You will be discharged with a ZIO patch prior to discharge    Follow up with Dr Harris to discuss outpatient TAVR    Hospital Encounter and Summary: Linked     See discharge assessment below for further details     Medications  Medications reviewed with patient/caregiver?: Yes (4/17/2024 10:05 AM)  Is the patient having any side effects they believe may be caused by any medication additions or changes?: No (4/17/2024 10:05 AM)  Does the patient have all medications ordered at discharge?: Yes (4/17/2024 10:05 AM)  Prescription Comments: meds reviewed DTR Ki ,  ,, metoprolol succinate ER (TOPROL XL) 25 mg 24 hr tablet   Indications: Atrial fibrillation, unspecified type (HCC) Take 0.5 tablets by mouth daily with dinner. Hold for heart rate less than 50   45 tablet   3 11/27/2023 11/26/2024  apixaban (ELIQUIS) 5 mg tab(s)   Take 1 tablet by mouth two times a day.   180 tablet   3 10/31/2023  10/30/2024  cyanocobalamin (VITAMIN B-12) 1,000 mcg tab   Take 1,000 mcg by mouth once daily.   0      timolol 0.5 % ophthalmic gel-forming   Use 2 Drops in both eyes once daily.   0      Cholecalciferol, Vitamin D3, (VITAMIN D) 1,000 unit tab   Take 2,000 Units by mouth once daily. (4/17/2024 10:05 AM)  Is the patient taking all medications as directed (includes completed medication regime)?: Yes (4/17/2024 10:05 AM)  Care Management Interventions: Provided patient education (4/17/2024 10:05 AM)  Medication Comments: DTR Ki aware of DC Amlodipine assists Kolton with V/S monitoring prior to adminster meds as directed (4/17/2024 10:05 AM)    Appointments  Does the patient have a primary care provider?: Yes (4/17/2024 10:05 AM)  Care Management Interventions: Educated patient on importance of making appointment (Tasekd to office no available) (4/17/2024 10:05 AM)  Care Management Interventions: Advised patient to keep appointment (05/16/2024 9:00 AM  Cardio STANLEY SEE [facility] [phone] 06/13/2024 3:00 PM  Cardio PARVIN PIZARRO [facility] [phone]) (4/17/2024 10:05 AM)    Self Management  What is the home health agency?: NA (4/17/2024 10:05 AM)  What Durable Medical Equipment (DME) was ordered?: NA (4/17/2024 10:05 AM)    Patient Teaching  Does the patient have access to their discharge instructions?: Yes (4/17/2024 10:05 AM)  Care Management Interventions: Reviewed instructions with patient (4/17/2024 10:05 AM)  What is the patient's perception of their health status since discharge?: Improving (4/17/2024 10:05 AM)  Is the patient/caregiver able to teach back the hierarchy of who to call/visit for symptoms/problems? PCP, Specialist, Home Health nurse, Urgent Care, ED, 911: Yes (4/17/2024 10:05 AM)  Patient/Caregiver Education Comments: DTR Ki assists as needed, patient lives alone , will go to out patient therapy. DTR KI aware to call provider for any questions concerns or change in condition (4/17/2024 10:05  AM)

## 2024-04-25 ENCOUNTER — OFFICE VISIT (OUTPATIENT)
Dept: PRIMARY CARE | Facility: CLINIC | Age: 89
End: 2024-04-25
Payer: MEDICARE

## 2024-04-25 VITALS
OXYGEN SATURATION: 97 % | SYSTOLIC BLOOD PRESSURE: 122 MMHG | DIASTOLIC BLOOD PRESSURE: 80 MMHG | WEIGHT: 163 LBS | HEART RATE: 53 BPM | BODY MASS INDEX: 22.82 KG/M2 | TEMPERATURE: 97.2 F | HEIGHT: 71 IN

## 2024-04-25 DIAGNOSIS — I48.91 ATRIAL FIBRILLATION, UNSPECIFIED TYPE (MULTI): Primary | ICD-10-CM

## 2024-04-25 DIAGNOSIS — I35.0 NONRHEUMATIC AORTIC VALVE STENOSIS: ICD-10-CM

## 2024-04-25 PROBLEM — M80.052D AGE-RELATED OSTEOPOROSIS WITH CURRENT PATHOL FRACTURE OF LEFT FEMUR, WITH ROUTINE HEALING, SUBSEQUENT ENCOUNTER: Status: ACTIVE | Noted: 2024-04-15

## 2024-04-25 PROBLEM — R55 SYNCOPE AND COLLAPSE: Status: ACTIVE | Noted: 2024-04-14

## 2024-04-25 PROCEDURE — 1159F MED LIST DOCD IN RCRD: CPT | Performed by: LICENSED PRACTICAL NURSE

## 2024-04-25 PROCEDURE — 3074F SYST BP LT 130 MM HG: CPT | Performed by: LICENSED PRACTICAL NURSE

## 2024-04-25 PROCEDURE — 1125F AMNT PAIN NOTED PAIN PRSNT: CPT | Performed by: LICENSED PRACTICAL NURSE

## 2024-04-25 PROCEDURE — 99496 TRANSJ CARE MGMT HIGH F2F 7D: CPT | Performed by: LICENSED PRACTICAL NURSE

## 2024-04-25 PROCEDURE — 3079F DIAST BP 80-89 MM HG: CPT | Performed by: LICENSED PRACTICAL NURSE

## 2024-04-25 PROCEDURE — 1160F RVW MEDS BY RX/DR IN RCRD: CPT | Performed by: LICENSED PRACTICAL NURSE

## 2024-04-25 PROCEDURE — 1036F TOBACCO NON-USER: CPT | Performed by: LICENSED PRACTICAL NURSE

## 2024-04-25 ASSESSMENT — PATIENT HEALTH QUESTIONNAIRE - PHQ9
1. LITTLE INTEREST OR PLEASURE IN DOING THINGS: NOT AT ALL
SUM OF ALL RESPONSES TO PHQ9 QUESTIONS 1 AND 2: 0
2. FEELING DOWN, DEPRESSED OR HOPELESS: NOT AT ALL

## 2024-04-25 ASSESSMENT — ENCOUNTER SYMPTOMS
LOSS OF SENSATION IN FEET: 0
OCCASIONAL FEELINGS OF UNSTEADINESS: 1

## 2024-04-25 ASSESSMENT — PAIN SCALES - GENERAL: PAINLEVEL: 2

## 2024-04-25 NOTE — PROGRESS NOTES
[facility]: [facility]  PROGRESS NOTE      Kolton Acosta is a 89 y.o. male that is presenting today for Follow-up (hosp , [facility], syncope and collapse).      Subjective   Pt presents to the office today accompanied by his daughter for follow up on his IP stay from 4/14/24-16/24. Mr. Acosta was admitted to the hospital related to increased fatigue, syncope and collapse. He has a past medical history of afib, hypertension and aortic stenosis. Upon exam in the ER on 4/14/24 he was given a CT brain and cervical spine, a -xray chest, and ankle brachial index all of which returned normal findings. It was noted that he had an echo from 1/2024 that showed aortic stenosis. His EKG showed afib. At the time of discharge it was believed his fatigue/weakness symptoms are were likely from symptomatic AF. He was to continue Toprol for HR control and Eliquis or CVA prevention. He was instructed to stop his Norvasc. He was to follow up with Cardiology, Vero Barber CNP, within 4 weeks and with Dr. Harris in July. Plan for TAVR discussion and Zio patch monitoring.     Mr. Rene is currently wearing the Zio patch. He is scheduled to follow with Cardiology on 5/16/24.  He reports no increased fatigue or weakness since his discharge.      Review of Systems   All other systems reviewed and are negative.     Objective   Vitals:    04/25/24 1454   BP: 122/80   Pulse: 53   Temp: 36.2 °C (97.2 °F)   SpO2: 97%      Body mass index is 22.73 kg/m².  Physical Exam  Constitutional:       General: He is not in acute distress.     Appearance: He is not ill-appearing, toxic-appearing or diaphoretic.   Cardiovascular:      Rate and Rhythm: Rhythm irregular.      Heart sounds: Normal heart sounds, S1 normal and S2 normal. No murmur heard.  Pulmonary:      Effort: Pulmonary effort is normal. No respiratory distress.      Breath sounds: Normal breath sounds. No decreased breath sounds, wheezing, rhonchi or rales.  "  Musculoskeletal:      Right lower leg: No edema.      Left lower leg: No edema.   Skin:     General: Skin is dry.     Diagnostic Results   Lab Results   Component Value Date    GLUCOSE 94 12/11/2023    CALCIUM 10.1 12/11/2023     12/11/2023    K 4.4 12/11/2023    CO2 28 12/11/2023     12/11/2023    BUN 16 12/11/2023    CREATININE 0.80 12/11/2023     Lab Results   Component Value Date    ALT 10 12/11/2023    AST 20 12/11/2023    ALKPHOS 99 12/11/2023    BILITOT 1.4 (H) 12/11/2023     Lab Results   Component Value Date    WBC 8.1 12/11/2023    HGB 15.7 12/11/2023    HCT 48.4 12/11/2023    MCV 97 12/11/2023     12/11/2023     Lab Results   Component Value Date    CHOL 162 12/11/2023    CHOL 168 11/09/2022    CHOL 197 11/04/2021     Lab Results   Component Value Date    HDL 60.0 12/11/2023    HDL 53 11/09/2022    HDL 52 11/04/2021     Lab Results   Component Value Date    LDLCALC 83 12/11/2023    LDLCALC 94 11/09/2022    LDLCALC 120 11/04/2021     Lab Results   Component Value Date    TRIG 97 12/11/2023    TRIG 106 11/09/2022    TRIG 125 11/04/2021     No components found for: \"CHOLHDL\"  Lab Results   Component Value Date    HGBA1C 5.6 08/13/2019     Other labs not included in the list above were reviewed either before or during this encounter.    History    Past Medical History:   Diagnosis Date   • A-fib (Multi)      Past Surgical History:   Procedure Laterality Date   • WISDOM TOOTH EXTRACTION  01/2024    12 teeth removed     Family History   Problem Relation Name Age of Onset   • Cancer Mother     • Colon cancer Mother     • Heart attack Father       Social History     Socioeconomic History   • Marital status:      Spouse name: Not on file   • Number of children: Not on file   • Years of education: Not on file   • Highest education level: Not on file   Occupational History   • Not on file   Tobacco Use   • Smoking status: Never   • Smokeless tobacco: Never   Vaping Use   • Vaping status: " Never Used   Substance and Sexual Activity   • Alcohol use: Never   • Drug use: Never   • Sexual activity: Not on file   Other Topics Concern   • Not on file   Social History Narrative   • Not on file     Social Determinants of Health     Financial Resource Strain: Not on file   Food Insecurity: Not on file   Transportation Needs: Not on file   Physical Activity: Not on file   Stress: Not on file   Social Connections: Not on file   Intimate Partner Violence: Not on file   Housing Stability: Not on file     No Known Allergies  Current Outpatient Medications on File Prior to Visit   Medication Sig Dispense Refill   • apixaban (Eliquis) 5 mg tablet Take 1 tablet (5 mg) by mouth 2 times a day.     • cholecalciferol (Vitamin D-3) 50 MCG (2000 UT) tablet Take 1 tablet (2,000 Units) by mouth once daily.     • cyanocobalamin (Vitamin B-12) 1,000 mcg tablet Take 1 tablet (1,000 mcg) by mouth once daily.     • metoprolol succinate XL (Toprol-XL) 25 mg 24 hr tablet Take 0.5 tablets (12.5 mg) by mouth. Only given if blood pressure top number is not less than 100 and heart rate not less than 50     • timolol (Timoptic) 0.5 % ophthalmic solution        No current facility-administered medications on file prior to visit.     Immunization History   Administered Date(s) Administered   • Flu vaccine, quadrivalent, high-dose, preservative free, age 65y+ (FLUZONE) 10/29/2020, 09/26/2021, 10/28/2023   • Influenza, High Dose Seasonal, Preservative Free 11/18/2015, 11/10/2016, 10/30/2017, 10/24/2018, 10/14/2019   • Influenza, Seasonal, Quadrivalent, Adjuvanted 11/01/2022   • Influenza, seasonal, injectable 09/26/2014   • Moderna SARS-CoV-2 Vaccination 01/20/2021, 02/17/2021   • Pfizer COVID-19 vaccine, Fall 2023, 12 years and older, (30mcg/0.3mL) 10/28/2023   • Pfizer COVID-19 vaccine, bivalent, age 12 years and older (30 mcg/0.3 mL) 12/14/2022   • Pneumococcal conjugate vaccine, 13-valent (PREVNAR 13) 08/24/2015   • Pneumococcal  polysaccharide vaccine, 23-valent, age 2 years and older (PNEUMOVAX 23) 08/12/2014     Patient's medical history was reviewed and updated either before or during this encounter.       Assessment/Plan   Problem List Items Addressed This Visit       Atrial fibrillation (Multi) - Primary    Nonrheumatic aortic valve stenosis   Mr. Acosta is doing well. He is no longer taking his amlodipine. He is without distress without reported concerns of palpations, fatigue, or SOB. His lung sounds are clear. He will continue to wear the Zio patch and follow up with Cardiology on the results as well as possible TAVR procedure. He is scheduled to follow up with his PCP 7/8/24.    IRINA Morrison-CNP

## 2024-04-30 ENCOUNTER — PATIENT OUTREACH (OUTPATIENT)
Dept: PRIMARY CARE | Facility: CLINIC | Age: 89
End: 2024-04-30
Payer: MEDICARE

## 2024-04-30 NOTE — PROGRESS NOTES
Call regarding appt. with PCP on 4/25/2024 after hospitalization.  At time of outreach call the patient s DTR Ki feels as if Keene condition has I improved  since last visit. Patients DTR will be sending back Zio patch today and will have Cardio F/U soon.     Reviewed the PCP appointment with the pt and addressed any questions or concerns.     Encouraged to call providers for any questions concerns or change in condition, offered CCM referral , information, at this time Ki would like to hold off D/T potential cost that made be involved. I will revisit need in future

## 2024-05-14 ENCOUNTER — PATIENT OUTREACH (OUTPATIENT)
Dept: PRIMARY CARE | Facility: CLINIC | Age: 89
End: 2024-05-14
Payer: MEDICARE

## 2024-05-14 NOTE — PROGRESS NOTES
Call regarding F/U     At time of outreach call the patients DTR Ki  feels as if Keene  condition has improved  some since last call , will see Cardiology this week and still has order for out patient therapy, may or may not proceed.     Encouraged DTR Ki to call providers for any questions concerns or change in condition

## 2024-07-03 ENCOUNTER — APPOINTMENT (OUTPATIENT)
Dept: PRIMARY CARE | Facility: CLINIC | Age: 89
End: 2024-07-03
Payer: MEDICARE

## 2024-07-03 ASSESSMENT — ENCOUNTER SYMPTOMS
HEADACHES: 0
ABDOMINAL PAIN: 0
DIARRHEA: 0
APPETITE CHANGE: 0
VOMITING: 0
SHORTNESS OF BREATH: 0
CHILLS: 0
COUGH: 0
NAUSEA: 0
FEVER: 0

## 2024-07-03 NOTE — PROGRESS NOTES
[facility]: [facility]  MEDICARE WELLNESS EXAM      Kolton Acosta is a 89 y.o. male that is presenting today for No chief complaint on file..    Assessment/Plan    Diagnoses and all orders for this visit:  Annual physical exam  Atrial fibrillation, unspecified type (Multi)  Atrial fibrillation and flutter (Multi)  Essential hypertension  Vitamin D deficiency  Neuropathy  Vitamin B12 deficiency  Insomnia, unspecified type  Nonrheumatic aortic valve stenosis    ADVANCED CARE PLANNING  Advanced Care Planning was discussed with patient:  The patient has an active advanced care plan on file. The patient has an active surrogate decision-maker on file.  Encouraged the patient to confirm that Living Will and Healthcare Power of  (HCPoA) are accurate and up to date.  Encouraged the patient to confirm that our office be provided a copy of any documentation in the event that anything changes.    ACTIVITIES OF DAILY LIVING  Basic ADLs:  Bathing: Independent, Dressing: Independent, Toileting: Independent, Transferring: Independent, Continence: Independent, Feeding: Independent.    Instrumental ADLs:  Ability to use phone: Independent, Shopping: Independent, Cooking: Independent, House-keeping: Independent, Laundry: Independent, Transportation: Independent, Medication Management: Independent, Finance Management: Independent.    Subjective   HPI  This patient presents today for annual physical, Medicare wellness exam.  Discussed screening/prevention, healthy lifestyle and code status.   Reviewed the patient's wishing regarding decision making.    Has been struggle with weakness did have an admission 2 months ago.  He has the aortic stenosis and was referred to the TAVR team.    Review of Systems   Constitutional:  Negative for appetite change, chills and fever.   Respiratory:  Negative for cough and shortness of breath.    Cardiovascular:  Negative for chest pain.   Gastrointestinal:  Negative for  abdominal pain, diarrhea, nausea and vomiting.   Neurological:  Negative for headaches.   All other systems reviewed and are negative.    Objective   There were no vitals filed for this visit.   There is no height or weight on file to calculate BMI.  Physical Exam  Vitals reviewed.   Constitutional:       General: He is not in acute distress.     Appearance: He is not toxic-appearing.   HENT:      Head: Normocephalic and atraumatic.      Mouth/Throat:      Mouth: Mucous membranes are moist.   Eyes:      Pupils: Pupils are equal, round, and reactive to light.   Cardiovascular:      Rate and Rhythm: Normal rate and regular rhythm.      Heart sounds: No murmur heard.  Pulmonary:      Breath sounds: Normal breath sounds. No wheezing, rhonchi or rales.   Abdominal:      General: There is no distension.      Palpations: Abdomen is soft.   Musculoskeletal:      Right lower leg: No edema.      Left lower leg: No edema.   Neurological:      General: No focal deficit present.      Mental Status: He is alert and oriented to person, place, and time.       Diagnostic Results   Lab Results   Component Value Date    GLUCOSE 94 12/11/2023    CALCIUM 10.1 12/11/2023     12/11/2023    K 4.4 12/11/2023    CO2 28 12/11/2023     12/11/2023    BUN 16 12/11/2023    CREATININE 0.80 12/11/2023     Lab Results   Component Value Date    ALT 10 12/11/2023    AST 20 12/11/2023    ALKPHOS 99 12/11/2023    BILITOT 1.4 (H) 12/11/2023     Lab Results   Component Value Date    WBC 8.1 12/11/2023    HGB 15.7 12/11/2023    HCT 48.4 12/11/2023    MCV 97 12/11/2023     12/11/2023     Lab Results   Component Value Date    CHOL 162 12/11/2023    CHOL 168 11/09/2022    CHOL 197 11/04/2021     Lab Results   Component Value Date    HDL 60.0 12/11/2023    HDL 53 11/09/2022    HDL 52 11/04/2021     Lab Results   Component Value Date    LDLCALC 83 12/11/2023    LDLCALC 94 11/09/2022    LDLCALC 120 11/04/2021     Lab Results   Component Value  "Date    TRIG 97 12/11/2023    TRIG 106 11/09/2022    TRIG 125 11/04/2021     No components found for: \"CHOLHDL\"  Lab Results   Component Value Date    HGBA1C 5.6 08/13/2019     Other labs not included in the list above reviewed either before or during this encounter.    History   Past Medical History:   Diagnosis Date    A-fib (Multi)      Past Surgical History:   Procedure Laterality Date    WISDOM TOOTH EXTRACTION  01/2024    12 teeth removed     Family History   Problem Relation Name Age of Onset    Cancer Mother      Colon cancer Mother      Heart attack Father       Social History     Socioeconomic History    Marital status:      Spouse name: Not on file    Number of children: Not on file    Years of education: Not on file    Highest education level: Not on file   Occupational History    Not on file   Tobacco Use    Smoking status: Never    Smokeless tobacco: Never   Vaping Use    Vaping status: Never Used   Substance and Sexual Activity    Alcohol use: Never    Drug use: Never    Sexual activity: Not on file   Other Topics Concern    Not on file   Social History Narrative    Not on file     Social Determinants of Health     Financial Resource Strain: Not on file   Food Insecurity: Not on file   Transportation Needs: Not on file   Physical Activity: Not on file   Stress: Not on file   Social Connections: Not on file   Intimate Partner Violence: Not on file   Housing Stability: Not on file     No Known Allergies  Current Outpatient Medications on File Prior to Visit   Medication Sig Dispense Refill    apixaban (Eliquis) 5 mg tablet Take 1 tablet (5 mg) by mouth 2 times a day.      cholecalciferol (Vitamin D-3) 50 MCG (2000 UT) tablet Take 1 tablet (2,000 Units) by mouth once daily.      cyanocobalamin (Vitamin B-12) 1,000 mcg tablet Take 1 tablet (1,000 mcg) by mouth once daily.      metoprolol succinate XL (Toprol-XL) 25 mg 24 hr tablet Take 0.5 tablets (12.5 mg) by mouth. Only given if blood pressure " top number is not less than 100 and heart rate not less than 50      timolol (Timoptic) 0.5 % ophthalmic solution        No current facility-administered medications on file prior to visit.     Immunization History   Administered Date(s) Administered    Flu vaccine, quadrivalent, high-dose, preservative free, age 65y+ (FLUZONE) 10/29/2020, 09/26/2021, 10/28/2023    Influenza, High Dose Seasonal, Preservative Free 11/18/2015, 11/10/2016, 10/30/2017, 10/24/2018, 10/14/2019    Influenza, Seasonal, Quadrivalent, Adjuvanted 11/01/2022    Influenza, seasonal, injectable 09/26/2014    Moderna SARS-CoV-2 Vaccination 01/20/2021, 02/17/2021    Pfizer COVID-19 vaccine, Fall 2023, 12 years and older, (30mcg/0.3mL) 10/28/2023    Pfizer COVID-19 vaccine, bivalent, age 12 years and older (30 mcg/0.3 mL) 12/14/2022    Pneumococcal conjugate vaccine, 13-valent (PREVNAR 13) 08/24/2015    Pneumococcal polysaccharide vaccine, 23-valent, age 2 years and older (PNEUMOVAX 23) 08/12/2014     Patient's medical history was reviewed and updated either before or during this encounter.     Ajay Griffith MD

## 2024-07-08 ENCOUNTER — OFFICE VISIT (OUTPATIENT)
Dept: PRIMARY CARE | Facility: CLINIC | Age: 89
End: 2024-07-08
Payer: MEDICARE

## 2024-07-08 DIAGNOSIS — R73.9 HYPERGLYCEMIA: ICD-10-CM

## 2024-07-08 DIAGNOSIS — I35.0 NONRHEUMATIC AORTIC VALVE STENOSIS: ICD-10-CM

## 2024-07-08 DIAGNOSIS — I48.91 ATRIAL FIBRILLATION, UNSPECIFIED TYPE (MULTI): ICD-10-CM

## 2024-07-08 DIAGNOSIS — E55.9 VITAMIN D DEFICIENCY: ICD-10-CM

## 2024-07-08 DIAGNOSIS — E53.8 VITAMIN B12 DEFICIENCY: ICD-10-CM

## 2024-07-08 DIAGNOSIS — Z01.89 ENCOUNTER FOR ROUTINE LABORATORY TESTING: ICD-10-CM

## 2024-07-08 DIAGNOSIS — G47.00 INSOMNIA, UNSPECIFIED TYPE: ICD-10-CM

## 2024-07-08 DIAGNOSIS — I48.92 ATRIAL FIBRILLATION AND FLUTTER (MULTI): ICD-10-CM

## 2024-07-08 DIAGNOSIS — I10 ESSENTIAL HYPERTENSION: ICD-10-CM

## 2024-07-08 DIAGNOSIS — G62.9 NEUROPATHY: ICD-10-CM

## 2024-07-08 DIAGNOSIS — Z00.00 ANNUAL PHYSICAL EXAM: Primary | ICD-10-CM

## 2024-07-08 DIAGNOSIS — I48.91 ATRIAL FIBRILLATION AND FLUTTER (MULTI): ICD-10-CM

## 2024-07-12 ENCOUNTER — PATIENT OUTREACH (OUTPATIENT)
Dept: PRIMARY CARE | Facility: CLINIC | Age: 89
End: 2024-07-12
Payer: MEDICARE

## 2024-07-12 NOTE — PROGRESS NOTES
Call regarding  F/U spoke with Ki patients DTR     At time of outreach call KI feels as if Kolton's condition has improved since last visit.    KI encouraged to call providers for any questions concerns or change in condition.

## 2024-09-12 ENCOUNTER — PATIENT OUTREACH (OUTPATIENT)
Dept: PRIMARY CARE | Facility: CLINIC | Age: 89
End: 2024-09-12
Payer: MEDICARE

## 2024-09-12 DIAGNOSIS — Z95.2 S/P TAVR (TRANSCATHETER AORTIC VALVE REPLACEMENT): ICD-10-CM

## 2024-09-12 DIAGNOSIS — Z09 HOSPITAL DISCHARGE FOLLOW-UP: ICD-10-CM

## 2024-09-12 RX ORDER — NAPROXEN SODIUM 220 MG/1
81 TABLET, FILM COATED ORAL
COMMUNITY

## 2024-09-12 RX ORDER — CEPHALEXIN 500 MG/1
500 CAPSULE ORAL 2 TIMES DAILY
COMMUNITY
Start: 2024-09-10 | End: 2024-09-15

## 2024-09-12 RX ORDER — AMLODIPINE BESYLATE 5 MG/1
5 TABLET ORAL EVERY 24 HOURS
COMMUNITY

## 2024-09-13 NOTE — PROGRESS NOTES
Discharge Facility: [facility]  Discharge Diagnosis: Acute on chronic systolic congestive heart failure (HCC), Paroxysmal atrial fibrillation (HCC), S/P TAVR (transcatheter aortic valve replacement)  Admission Date: 9/10/24  Discharge Date: 9/10/24    PCP Appointment Date: 10/3/24  Specialist Appointment Date: Cardiology- 9/11/24 & 9/24/24  Hospital Encounter and Summary Linked: Yes  See discharge assessment below for further details    Engagement  Call Start Time: 1146 (9/12/2024  2:46 PM)    Medications  Medications reviewed with patient/caregiver?: Yes (SPOKE WITH THE PATIENTS DAUGHTER KI.) (9/12/2024  2:46 PM)  Is the patient having any side effects they believe may be caused by any medication additions or changes?: No (9/12/2024  2:46 PM)  Does the patient have all medications ordered at discharge?: Yes (9/12/2024  2:46 PM)  Care Management Interventions: No intervention needed (9/12/2024  2:46 PM)  Prescription Comments: START: KEFLEX AND ELIQUIS (9/12/2024  2:46 PM)  Is the patient taking all medications as directed (includes completed medication regime)?: Yes (9/12/2024  2:46 PM)  Care Management Interventions: Provided patient education (9/12/2024  2:46 PM)  Medication Comments: SEE MEDICATION LIST. (9/12/2024  2:46 PM)    Appointments  Does the patient have a primary care provider?: Yes (9/12/2024  2:46 PM)  Care Management Interventions: Educated patient on importance of making appointment; Advised patient to make appointment (APPT CURRENTLY SCHEDULED OUTSIDE 14 DAY WINDOW. PATIENT DECLINED TO MOVE UP APPT.) (9/12/2024  2:46 PM)  Has the patient kept scheduled appointments due by today?: Yes (PATIENT HAD A FOLLOW UP APPT WITH CARDIOLOGY YESTERDAY.) (9/12/2024  2:46 PM)  Care Management Interventions: Educated on importance of keeping appointment (9/12/2024  2:46 PM)    Self Management  What is the home health agency?: N/A (9/12/2024  2:46 PM)  Has home health visited the patient within 72 hours of  "discharge?: Not applicable (9/12/2024  2:46 PM)  What Durable Medical Equipment (DME) was ordered?: N/A (9/12/2024  2:46 PM)    Patient Teaching  Does the patient have access to their discharge instructions?: Yes (9/12/2024  2:46 PM)  Care Management Interventions: Reviewed instructions with patient; Educated on MyChart (9/12/2024  2:46 PM)  What is the patient's perception of their health status since discharge?: Improving (PATIENTS DAUGHTER REPORTS THE PATIENT IS DOING \"MUCH BETTER\") (9/12/2024  2:46 PM)  Is the patient/caregiver able to teach back the hierarchy of who to call/visit for symptoms/problems? PCP, Specialist, Home Health nurse, Urgent Care, ED, 911: Yes (9/12/2024  2:46 PM)  Patient/Caregiver Education Comments: DISCUSSED DISCHARGE SUMMARY, INCLUDING WOUND CARE DIRECTIONS AND ACTIVITY INSTRUCTIONS POST OP. PATIENTS DAUGHTER KI STATES THE PATIENT IS DOING MUCH BETTER AND IS HAPPY TO BE BACK HOME. KI STATES THEY ARE AWARE OF ALL MEDICATION CHANGES AND HAVE STARTED THEM, WITH NO ADVERSE EFFECTS TO REPORT AT TIME OF CALL. (9/12/2024  2:46 PM)    Wrap Up  Wrap Up Additional Comments: TCM INITIAL OUTREACH POST DISCHARGE COMPLETED SUCCESSFULLY. TCM PHONE NUMBER WAS PROVIDED TO THE PATIENTS DAUGHTER AND ENCOURAGED TO CALL BACK WITH ANY NON-EMERGENT QUESTIONS, CONCERNS OR NEEDS FROM TCM. KI VERBALIZED HER UNDERSTANDING AND STATES SHE WILL CALL BACK IF NEEDED. PCP APPT IS OUTSIDE THE 14 DAY WINDOW FOR TCM- DECLINED TO MOVE UP. PATIENT HAS SEVERAL CARDIOLOGY FOLLOW UP APPTS OVER THE NEXT WEEK. (9/12/2024  2:46 PM)  Call End Time: 1449 (9/12/2024  2:46 PM)          "

## 2024-10-01 ASSESSMENT — ENCOUNTER SYMPTOMS
SHORTNESS OF BREATH: 0
ABDOMINAL PAIN: 0
FEVER: 0
COUGH: 0
CHILLS: 0
APPETITE CHANGE: 0
HEADACHES: 0
NAUSEA: 0
DIARRHEA: 0
VOMITING: 0

## 2024-10-02 NOTE — PROGRESS NOTES
[facility]: [facility]  MEDICARE WELLNESS EXAM      Kolton Acosta is a 89 y.o. male that is presenting today for Follow-up (6 mos fu ).    Assessment/Plan    Diagnoses and all orders for this visit:  Annual physical exam  -     Referral to Physical Therapy; Future  S/P TAVR (transcatheter aortic valve replacement)  Atrial fibrillation and flutter  -     CBC and Auto Differential; Future  -     Comprehensive Metabolic Panel; Future  Essential hypertension  Nonrheumatic aortic valve stenosis  Neuropathy  Vitamin B12 deficiency  Other orders  -     Follow Up In Primary Care - Established  -     Follow Up In Primary Care - Established; Future  -     Flu vaccine, trivalent, preservative free, HIGH-DOSE, age 65y+ (Fluzone)    He is doing well after TAVR but the real issue is his declining mobility and risk of falls.  We talked about fall prevention strategies and I would like to get him involved in a physical therapy program again and he certainly needs to get a life alert button.  In the big picture it would be better for him to move into an assisted living facility and his daughter seems to feel the same way, although this does not seem to be financially feasible and he does not want to move into a nursing home.    For now, continue anticoagulation with the apixaban.    Would like to recheck laboratory studies to assure that the potassium has come back up.    Flu vaccination today as ordered.    ADVANCED CARE PLANNING  Advanced Care Planning was discussed with patient:  The patient has an active advanced care plan on file. The patient has an active surrogate decision-maker on file.  Encouraged the patient to confirm that Living Will and Healthcare Power of  (HCPoA) are accurate and up to date.  Encouraged the patient to confirm that our office be provided a copy of any documentation in the event that anything changes.    ACTIVITIES OF DAILY LIVING  Basic ADLs:  Bathing: Independent,  Dressing: Independent, Toileting: Independent, Transferring: Independent, Continence: Independent, Feeding: Independent.    Instrumental ADLs:  Ability to use phone: Independent, Shopping: Independent, Cooking: Independent, House-keeping: Independent, Laundry: Independent, Transportation: Independent, Medication Management: Independent, Finance Management: Independent.    Subjective   HPI  This patient presents today for annual physical, Medicare wellness exam.  Discussed screening/prevention, healthy lifestyle and code status.   Reviewed the patient's wishes regarding decision making.    Consultant visits and notes reviewed: Cardio - is immediately status post TAVR - was seen by cardio in follow up and is doing well.    Stable from a functional standpoint in regard to ADLs and IADLs.  No recent falls are reported.    The patient denies chest pain and shortness of breath.  No exertion-provoked or anginal-type symptoms are reported.    Review of Systems   Constitutional:  Negative for appetite change, chills and fever.   Respiratory:  Negative for cough and shortness of breath.    Cardiovascular:  Negative for chest pain.   Gastrointestinal:  Negative for abdominal pain, diarrhea, nausea and vomiting.   Neurological:  Negative for headaches.   All other systems reviewed and are negative.    Objective   Vitals:    10/03/24 1537   BP: 110/60   Pulse: 51   Temp: 36.2 °C (97.2 °F)   SpO2: 94%      Body mass index is 20.08 kg/m².  Physical Exam  Vitals reviewed.   Constitutional:       General: He is not in acute distress.     Appearance: He is not toxic-appearing.   HENT:      Head: Normocephalic and atraumatic.      Mouth/Throat:      Mouth: Mucous membranes are moist.   Eyes:      Pupils: Pupils are equal, round, and reactive to light.   Cardiovascular:      Rate and Rhythm: Normal rate and regular rhythm.      Heart sounds: No murmur heard.  Pulmonary:      Breath sounds: Normal breath sounds. No wheezing, rhonchi or  "rales.   Abdominal:      General: There is no distension.      Palpations: Abdomen is soft.   Musculoskeletal:      Right lower leg: No edema.      Left lower leg: No edema.   Neurological:      General: No focal deficit present.      Mental Status: He is alert and oriented to person, place, and time.       Diagnostic Results   Lab Results   Component Value Date    GLUCOSE 94 12/11/2023    CALCIUM 10.1 12/11/2023     12/11/2023    K 4.4 12/11/2023    CO2 28 12/11/2023     12/11/2023    BUN 16 12/11/2023    CREATININE 0.80 12/11/2023     Lab Results   Component Value Date    ALT 10 12/11/2023    AST 20 12/11/2023    ALKPHOS 99 12/11/2023    BILITOT 1.4 (H) 12/11/2023     Lab Results   Component Value Date    WBC 8.1 12/11/2023    HGB 15.7 12/11/2023    HCT 48.4 12/11/2023    MCV 97 12/11/2023     12/11/2023     Lab Results   Component Value Date    CHOL 162 12/11/2023    CHOL 168 11/09/2022    CHOL 197 11/04/2021     Lab Results   Component Value Date    HDL 60.0 12/11/2023    HDL 53 11/09/2022    HDL 52 11/04/2021     Lab Results   Component Value Date    LDLCALC 83 12/11/2023    LDLCALC 94 11/09/2022    LDLCALC 120 11/04/2021     Lab Results   Component Value Date    TRIG 97 12/11/2023    TRIG 106 11/09/2022    TRIG 125 11/04/2021     No components found for: \"CHOLHDL\"  Lab Results   Component Value Date    HGBA1C 5.6 08/13/2019     Other labs not included in the list above reviewed either before or during this encounter.    History   Past Medical History:   Diagnosis Date    A-fib (Multi)     Aortic stenosis 09/10/2024     Past Surgical History:   Procedure Laterality Date    ANOMALOUS PULMONARY VENOUS RETURN REPAIR, TOTAL      AORTIC VALVE REPLACEMENT  09/10/2024    WISDOM TOOTH EXTRACTION  01/2024    12 teeth removed     Family History   Problem Relation Name Age of Onset    Cancer Mother      Colon cancer Mother      Heart attack Father       Social History     Socioeconomic History    " Marital status:      Spouse name: Not on file    Number of children: Not on file    Years of education: Not on file    Highest education level: Not on file   Occupational History    Not on file   Tobacco Use    Smoking status: Never     Passive exposure: Never    Smokeless tobacco: Never   Vaping Use    Vaping status: Never Used   Substance and Sexual Activity    Alcohol use: Never    Drug use: Never    Sexual activity: Not on file   Other Topics Concern    Not on file   Social History Narrative    Not on file     Social Determinants of Health     Financial Resource Strain: Not on file   Food Insecurity: Not on file   Transportation Needs: Not on file   Physical Activity: Not on file   Stress: Not on file   Social Connections: Not on file   Intimate Partner Violence: Not on file   Housing Stability: Not on file     No Known Allergies  Current Outpatient Medications on File Prior to Visit   Medication Sig Dispense Refill    apixaban (Eliquis) 5 mg tablet Take 1 tablet (5 mg) by mouth 2 times a day.      cholecalciferol (Vitamin D-3) 50 MCG (2000 UT) tablet Take 1 tablet (2,000 Units) by mouth once daily.      cyanocobalamin (Vitamin B-12) 1,000 mcg tablet Take 1 tablet (1,000 mcg) by mouth once daily.      timolol (Timoptic) 0.5 % ophthalmic solution       [DISCONTINUED] amLODIPine (Norvasc) 5 mg tablet Take 1 tablet (5 mg) by mouth once every 24 hours.      [DISCONTINUED] aspirin 81 mg chewable tablet Chew 1 tablet (81 mg). Orally three days per week      [DISCONTINUED] metoprolol succinate XL (Toprol-XL) 25 mg 24 hr tablet Take 0.5 tablets (12.5 mg) by mouth. Only given if blood pressure top number is not less than 100 and heart rate not less than 50       No current facility-administered medications on file prior to visit.     Immunization History   Administered Date(s) Administered    Flu vaccine, quadrivalent, high-dose, preservative free, age 65y+ (FLUZONE) 10/29/2020, 09/26/2021, 10/28/2023    Flu  vaccine, trivalent, preservative free, HIGH-DOSE, age 65y+ (Fluzone) 11/18/2015, 11/10/2016, 10/30/2017, 10/24/2018, 10/14/2019, 10/03/2024    Influenza, Seasonal, Quadrivalent, Adjuvanted 11/01/2022    Influenza, seasonal, injectable 09/26/2014    Moderna SARS-CoV-2 Vaccination 01/20/2021, 02/17/2021    Pfizer COVID-19 vaccine, 12 years and older, (30mcg/0.3mL) (Comirnaty) 10/28/2023    Pfizer COVID-19 vaccine, bivalent, age 12 years and older (30 mcg/0.3 mL) 12/14/2022    Pneumococcal conjugate vaccine, 13-valent (PREVNAR 13) 08/24/2015    Pneumococcal polysaccharide vaccine, 23-valent, age 2 years and older (PNEUMOVAX 23) 08/12/2014     Patient's medical history was reviewed and updated either before or during this encounter.     Ajay Griffith MD

## 2024-10-03 ENCOUNTER — LAB (OUTPATIENT)
Dept: LAB | Facility: LAB | Age: 89
End: 2024-10-03
Payer: MEDICARE

## 2024-10-03 ENCOUNTER — OFFICE VISIT (OUTPATIENT)
Dept: PRIMARY CARE | Facility: CLINIC | Age: 89
End: 2024-10-03
Payer: MEDICARE

## 2024-10-03 VITALS
OXYGEN SATURATION: 94 % | HEART RATE: 51 BPM | HEIGHT: 71 IN | DIASTOLIC BLOOD PRESSURE: 60 MMHG | SYSTOLIC BLOOD PRESSURE: 110 MMHG | WEIGHT: 144 LBS | BODY MASS INDEX: 20.16 KG/M2 | TEMPERATURE: 97.2 F

## 2024-10-03 DIAGNOSIS — I48.92 ATRIAL FIBRILLATION AND FLUTTER: ICD-10-CM

## 2024-10-03 DIAGNOSIS — I35.0 NONRHEUMATIC AORTIC VALVE STENOSIS: ICD-10-CM

## 2024-10-03 DIAGNOSIS — I48.91 ATRIAL FIBRILLATION AND FLUTTER: ICD-10-CM

## 2024-10-03 DIAGNOSIS — G62.9 NEUROPATHY: ICD-10-CM

## 2024-10-03 DIAGNOSIS — I10 ESSENTIAL HYPERTENSION: ICD-10-CM

## 2024-10-03 DIAGNOSIS — Z95.2 S/P TAVR (TRANSCATHETER AORTIC VALVE REPLACEMENT): ICD-10-CM

## 2024-10-03 DIAGNOSIS — E53.8 VITAMIN B12 DEFICIENCY: ICD-10-CM

## 2024-10-03 DIAGNOSIS — Z00.00 ANNUAL PHYSICAL EXAM: Primary | ICD-10-CM

## 2024-10-03 LAB
ALBUMIN SERPL BCP-MCNC: 4.1 G/DL (ref 3.4–5)
ALP SERPL-CCNC: 79 U/L (ref 33–136)
ALT SERPL W P-5'-P-CCNC: 10 U/L (ref 10–52)
ANION GAP SERPL CALCULATED.3IONS-SCNC: 12 MMOL/L (ref 10–20)
AST SERPL W P-5'-P-CCNC: 23 U/L (ref 9–39)
BASOPHILS # BLD AUTO: 0.08 X10*3/UL (ref 0–0.1)
BASOPHILS NFR BLD AUTO: 1.3 %
BILIRUB SERPL-MCNC: 2.3 MG/DL (ref 0–1.2)
BUN SERPL-MCNC: 15 MG/DL (ref 6–23)
CALCIUM SERPL-MCNC: 10.1 MG/DL (ref 8.6–10.3)
CHLORIDE SERPL-SCNC: 97 MMOL/L (ref 98–107)
CO2 SERPL-SCNC: 32 MMOL/L (ref 21–32)
CREAT SERPL-MCNC: 0.64 MG/DL (ref 0.5–1.3)
EGFRCR SERPLBLD CKD-EPI 2021: 86 ML/MIN/1.73M*2
EOSINOPHIL # BLD AUTO: 0.25 X10*3/UL (ref 0–0.4)
EOSINOPHIL NFR BLD AUTO: 4.1 %
ERYTHROCYTE [DISTWIDTH] IN BLOOD BY AUTOMATED COUNT: 13.7 % (ref 11.5–14.5)
GLUCOSE SERPL-MCNC: 85 MG/DL (ref 74–99)
HCT VFR BLD AUTO: 51.1 % (ref 41–52)
HGB BLD-MCNC: 17 G/DL (ref 13.5–17.5)
IMM GRANULOCYTES # BLD AUTO: 0.02 X10*3/UL (ref 0–0.5)
IMM GRANULOCYTES NFR BLD AUTO: 0.3 % (ref 0–0.9)
LYMPHOCYTES # BLD AUTO: 1.76 X10*3/UL (ref 0.8–3)
LYMPHOCYTES NFR BLD AUTO: 28.6 %
MCH RBC QN AUTO: 32.8 PG (ref 26–34)
MCHC RBC AUTO-ENTMCNC: 33.3 G/DL (ref 32–36)
MCV RBC AUTO: 99 FL (ref 80–100)
MONOCYTES # BLD AUTO: 0.73 X10*3/UL (ref 0.05–0.8)
MONOCYTES NFR BLD AUTO: 11.9 %
NEUTROPHILS # BLD AUTO: 3.32 X10*3/UL (ref 1.6–5.5)
NEUTROPHILS NFR BLD AUTO: 53.8 %
NRBC BLD-RTO: 0 /100 WBCS (ref 0–0)
PLATELET # BLD AUTO: 142 X10*3/UL (ref 150–450)
POTASSIUM SERPL-SCNC: 5.1 MMOL/L (ref 3.5–5.3)
PROT SERPL-MCNC: 7.4 G/DL (ref 6.4–8.2)
RBC # BLD AUTO: 5.19 X10*6/UL (ref 4.5–5.9)
SODIUM SERPL-SCNC: 136 MMOL/L (ref 136–145)
WBC # BLD AUTO: 6.2 X10*3/UL (ref 4.4–11.3)

## 2024-10-03 PROCEDURE — 85025 COMPLETE CBC W/AUTO DIFF WBC: CPT

## 2024-10-03 PROCEDURE — 1126F AMNT PAIN NOTED NONE PRSNT: CPT | Performed by: INTERNAL MEDICINE

## 2024-10-03 PROCEDURE — 90662 IIV NO PRSV INCREASED AG IM: CPT | Performed by: INTERNAL MEDICINE

## 2024-10-03 PROCEDURE — 1036F TOBACCO NON-USER: CPT | Performed by: INTERNAL MEDICINE

## 2024-10-03 PROCEDURE — G0439 PPPS, SUBSEQ VISIT: HCPCS | Performed by: INTERNAL MEDICINE

## 2024-10-03 PROCEDURE — 36415 COLL VENOUS BLD VENIPUNCTURE: CPT

## 2024-10-03 PROCEDURE — 3078F DIAST BP <80 MM HG: CPT | Performed by: INTERNAL MEDICINE

## 2024-10-03 PROCEDURE — 80053 COMPREHEN METABOLIC PANEL: CPT

## 2024-10-03 PROCEDURE — 1159F MED LIST DOCD IN RCRD: CPT | Performed by: INTERNAL MEDICINE

## 2024-10-03 PROCEDURE — 99215 OFFICE O/P EST HI 40 MIN: CPT | Mod: 25 | Performed by: INTERNAL MEDICINE

## 2024-10-03 PROCEDURE — 3074F SYST BP LT 130 MM HG: CPT | Performed by: INTERNAL MEDICINE

## 2024-10-03 ASSESSMENT — ENCOUNTER SYMPTOMS
LOSS OF SENSATION IN FEET: 1
OCCASIONAL FEELINGS OF UNSTEADINESS: 1
DEPRESSION: 1

## 2024-10-03 ASSESSMENT — PATIENT HEALTH QUESTIONNAIRE - PHQ9
2. FEELING DOWN, DEPRESSED OR HOPELESS: NOT AT ALL
SUM OF ALL RESPONSES TO PHQ9 QUESTIONS 1 AND 2: 0
1. LITTLE INTEREST OR PLEASURE IN DOING THINGS: NOT AT ALL

## 2024-10-03 ASSESSMENT — PAIN SCALES - GENERAL: PAINLEVEL: 0-NO PAIN

## 2024-10-10 ENCOUNTER — PATIENT OUTREACH (OUTPATIENT)
Dept: PRIMARY CARE | Facility: CLINIC | Age: 89
End: 2024-10-10
Payer: MEDICARE

## 2024-10-10 DIAGNOSIS — Z09 HOSPITAL DISCHARGE FOLLOW-UP: ICD-10-CM

## 2024-10-10 NOTE — PROGRESS NOTES
Successful outreach to the patients daughter Ki for 30 day post discharge follow up, as patient continues TCM program.   At time of outreach call the patients daughter Ki feels as if the patients condition has improved since initial visit with PCP or specialist. Patient saw his PCP 10/3/24 with a referral given for physical therapy. Ki states patient will be starting therapy soon and will see his cardiologist again on 10/28/24. Ki denied any acute changes or concerns on behalf of the patient. Questions or concerns addressed at this time with patients caregiver. Provided contact information to patient if any further non-emergent needs arise.  Patient has close follow up with Cardiology and denied any further needs from TCM. Patient has met target of no readmission for 30 days post hospital discharge and is graduated from Transitional Care Management program at this time.

## 2024-10-31 DIAGNOSIS — I48.91 ATRIAL FIBRILLATION, UNSPECIFIED TYPE (MULTI): ICD-10-CM

## 2025-01-30 ASSESSMENT — ENCOUNTER SYMPTOMS
ABDOMINAL PAIN: 0
SHORTNESS OF BREATH: 0
FEVER: 0

## 2025-01-30 NOTE — PROGRESS NOTES
[facility]: [facility]  PROGRESS NOTE      Kolton Acosta is a 89 y.o. male that is presenting today for Follow-up.    Assessment/Plan   Diagnoses and all orders for this visit:  Atrial fibrillation and flutter  S/P TAVR (transcatheter aortic valve replacement)  Essential hypertension  Nonrheumatic aortic valve stenosis  Atrial fibrillation, unspecified type (Multi)  Skin lesion  -     Referral to Dermatology  Other orders  -     Follow Up In Primary Care - Established  -     Follow Up In Primary Care - Established; Future    Atrial fibrillation, rate controlled and he is anticoagulated on apixaban.    Aortic stenosis, status post Hopper.    Overall debility, we talked about some exercises he can do to help maintain his leg strength and prevent falls.    Subjective   HPI  89 y.o. male here for follow up.  Follows w/ cardiology status post TAVR.  He feels great, no chest pain, no shortness of breath.  Gait is a little shaky, he has a hard time standing from a seated position.  He does use his cane or rollator.  No recent falls.  He refuses to use a life alert button.    Review of Systems   Constitutional:  Negative for fever.   Respiratory:  Negative for shortness of breath.    Cardiovascular:  Negative for chest pain.   Gastrointestinal:  Negative for abdominal pain.   All other systems reviewed and are negative.     Objective   Vitals:    01/31/25 1511   BP: 118/84   Pulse: 68   Temp: 36 °C (96.8 °F)   SpO2: 94%      Body mass index is 20.5 kg/m².  Physical Exam  Vitals reviewed.   Constitutional:       Appearance: Normal appearance.   Cardiovascular:      Rate and Rhythm: Normal rate and regular rhythm.      Heart sounds: No murmur heard.  Pulmonary:      Breath sounds: Normal breath sounds. No wheezing, rhonchi or rales.   Musculoskeletal:      Right lower leg: No edema.      Left lower leg: No edema.       Diagnostic Results   Lab Results   Component Value Date    GLUCOSE 85 10/03/2024     "CALCIUM 10.1 10/03/2024     10/03/2024    K 5.1 10/03/2024    CO2 32 10/03/2024    CL 97 (L) 10/03/2024    BUN 15 10/03/2024    CREATININE 0.64 10/03/2024     Lab Results   Component Value Date    ALT 10 10/03/2024    AST 23 10/03/2024    ALKPHOS 79 10/03/2024    BILITOT 2.3 (H) 10/03/2024     Lab Results   Component Value Date    WBC 6.2 10/03/2024    HGB 17.0 10/03/2024    HCT 51.1 10/03/2024    MCV 99 10/03/2024     (L) 10/03/2024     Lab Results   Component Value Date    CHOL 162 12/11/2023    CHOL 168 11/09/2022    CHOL 197 11/04/2021     Lab Results   Component Value Date    HDL 60.0 12/11/2023    HDL 53 11/09/2022    HDL 52 11/04/2021     Lab Results   Component Value Date    LDLCALC 83 12/11/2023    LDLCALC 94 11/09/2022    LDLCALC 120 11/04/2021     Lab Results   Component Value Date    TRIG 97 12/11/2023    TRIG 106 11/09/2022    TRIG 125 11/04/2021     No components found for: \"CHOLHDL\"  Lab Results   Component Value Date    HGBA1C 5.6 08/13/2019     Other labs not included in the list above were reviewed either before or during this encounter.    History    Past Medical History:   Diagnosis Date    A-fib (Multi)     Anxiety     Aortic stenosis 09/10/2024    Cataract     Glaucoma     HL (hearing loss)      Past Surgical History:   Procedure Laterality Date    ANOMALOUS PULMONARY VENOUS RETURN REPAIR, TOTAL      AORTIC VALVE REPLACEMENT  09/10/2024    CARDIAC CATHETERIZATION      CARDIAC VALVE REPLACEMENT      WISDOM TOOTH EXTRACTION  01/2024    12 teeth removed     Family History   Problem Relation Name Age of Onset    Cancer Mother Jeanette     Colon cancer Mother Jeanette     Heart attack Father       Social History     Socioeconomic History    Marital status:      Spouse name: Not on file    Number of children: Not on file    Years of education: Not on file    Highest education level: Not on file   Occupational History    Not on file   Tobacco Use    Smoking status: Never     Passive " exposure: Never    Smokeless tobacco: Never   Vaping Use    Vaping status: Never Used   Substance and Sexual Activity    Alcohol use: Never    Drug use: Never    Sexual activity: Not Currently     Partners: Female   Other Topics Concern    Not on file   Social History Narrative    Not on file     Social Drivers of Health     Financial Resource Strain: Not on file   Food Insecurity: Not on file   Transportation Needs: Not on file   Physical Activity: Not on file   Stress: Not on file   Social Connections: Not on file   Intimate Partner Violence: Not on file   Housing Stability: Not on file     No Known Allergies  Current Outpatient Medications on File Prior to Visit   Medication Sig Dispense Refill    apixaban (Eliquis) 5 mg tablet Take 1 tablet (5 mg) by mouth 2 times a day. 180 tablet 3    cholecalciferol (Vitamin D-3) 50 MCG (2000 UT) tablet Take 1 tablet (2,000 Units) by mouth once daily.      cyanocobalamin (Vitamin B-12) 1,000 mcg tablet Take 1 tablet (1,000 mcg) by mouth once daily.      timolol (Timoptic) 0.5 % ophthalmic solution        No current facility-administered medications on file prior to visit.     Immunization History   Administered Date(s) Administered    COVID-19, mRNA, LNP-S, PF, 30 mcg/0.3 mL dose 11/01/2021    Flu vaccine, quadrivalent, high-dose, preservative free, age 65y+ (FLUZONE) 10/29/2020, 09/26/2021, 10/28/2023    Flu vaccine, trivalent, preservative free, HIGH-DOSE, age 65y+ (Fluzone) 11/18/2015, 11/10/2016, 10/30/2017, 10/24/2018, 10/14/2019, 10/03/2024    Influenza, Seasonal, Quadrivalent, Adjuvanted 11/01/2022    Influenza, seasonal, injectable 09/26/2014    Moderna SARS-CoV-2 Vaccination 01/20/2021, 02/17/2021    Pfizer COVID-19 vaccine, 12 years and older, (30mcg/0.3mL) (Comirnaty) 10/28/2023    Pfizer COVID-19 vaccine, bivalent, age 12 years and older (30 mcg/0.3 mL) 12/14/2022    Pneumococcal conjugate vaccine, 13-valent (PREVNAR 13) 08/24/2015    Pneumococcal polysaccharide  vaccine, 23-valent, age 2 years and older (PNEUMOVAX 23) 08/12/2014     Patient's medical history was reviewed and updated either before or during this encounter.       Ajay Griffith MD

## 2025-01-31 ENCOUNTER — OFFICE VISIT (OUTPATIENT)
Dept: PRIMARY CARE | Facility: CLINIC | Age: OVER 89
End: 2025-01-31
Payer: MEDICARE

## 2025-01-31 VITALS
HEART RATE: 68 BPM | SYSTOLIC BLOOD PRESSURE: 118 MMHG | DIASTOLIC BLOOD PRESSURE: 84 MMHG | WEIGHT: 147 LBS | HEIGHT: 71 IN | BODY MASS INDEX: 20.58 KG/M2 | TEMPERATURE: 96.8 F | OXYGEN SATURATION: 94 %

## 2025-01-31 DIAGNOSIS — L98.9 SKIN LESION: ICD-10-CM

## 2025-01-31 DIAGNOSIS — I10 ESSENTIAL HYPERTENSION: ICD-10-CM

## 2025-01-31 DIAGNOSIS — I35.0 NONRHEUMATIC AORTIC VALVE STENOSIS: ICD-10-CM

## 2025-01-31 DIAGNOSIS — Z95.2 S/P TAVR (TRANSCATHETER AORTIC VALVE REPLACEMENT): ICD-10-CM

## 2025-01-31 DIAGNOSIS — I48.91 ATRIAL FIBRILLATION, UNSPECIFIED TYPE (MULTI): ICD-10-CM

## 2025-01-31 DIAGNOSIS — I48.91 ATRIAL FIBRILLATION AND FLUTTER: Primary | ICD-10-CM

## 2025-01-31 DIAGNOSIS — I48.92 ATRIAL FIBRILLATION AND FLUTTER: Primary | ICD-10-CM

## 2025-01-31 PROCEDURE — 99214 OFFICE O/P EST MOD 30 MIN: CPT | Performed by: INTERNAL MEDICINE

## 2025-01-31 PROCEDURE — 1126F AMNT PAIN NOTED NONE PRSNT: CPT | Performed by: INTERNAL MEDICINE

## 2025-01-31 PROCEDURE — 1036F TOBACCO NON-USER: CPT | Performed by: INTERNAL MEDICINE

## 2025-01-31 PROCEDURE — G2211 COMPLEX E/M VISIT ADD ON: HCPCS | Performed by: INTERNAL MEDICINE

## 2025-01-31 PROCEDURE — 3074F SYST BP LT 130 MM HG: CPT | Performed by: INTERNAL MEDICINE

## 2025-01-31 PROCEDURE — 3079F DIAST BP 80-89 MM HG: CPT | Performed by: INTERNAL MEDICINE

## 2025-01-31 PROCEDURE — 1159F MED LIST DOCD IN RCRD: CPT | Performed by: INTERNAL MEDICINE

## 2025-01-31 ASSESSMENT — ENCOUNTER SYMPTOMS
LOSS OF SENSATION IN FEET: 0
OCCASIONAL FEELINGS OF UNSTEADINESS: 1
DEPRESSION: 0

## 2025-01-31 ASSESSMENT — LIFESTYLE VARIABLES
HOW OFTEN DURING THE LAST YEAR HAVE YOU NEEDED AN ALCOHOLIC DRINK FIRST THING IN THE MORNING TO GET YOURSELF GOING AFTER A NIGHT OF HEAVY DRINKING: NEVER
HOW OFTEN DO YOU HAVE SIX OR MORE DRINKS ON ONE OCCASION: NEVER
HOW OFTEN DURING THE LAST YEAR HAVE YOU HAD A FEELING OF GUILT OR REMORSE AFTER DRINKING: NEVER
HOW MANY STANDARD DRINKS CONTAINING ALCOHOL DO YOU HAVE ON A TYPICAL DAY: PATIENT DOES NOT DRINK
HAS A RELATIVE, FRIEND, DOCTOR, OR ANOTHER HEALTH PROFESSIONAL EXPRESSED CONCERN ABOUT YOUR DRINKING OR SUGGESTED YOU CUT DOWN: NO
SKIP TO QUESTIONS 9-10: 1
HOW OFTEN DURING THE LAST YEAR HAVE YOU BEEN UNABLE TO REMEMBER WHAT HAPPENED THE NIGHT BEFORE BECAUSE YOU HAD BEEN DRINKING: NEVER
HOW OFTEN DURING THE LAST YEAR HAVE YOU FAILED TO DO WHAT WAS NORMALLY EXPECTED FROM YOU BECAUSE OF DRINKING: NEVER
HAVE YOU OR SOMEONE ELSE BEEN INJURED AS A RESULT OF YOUR DRINKING: NO
AUDIT-C TOTAL SCORE: 0
AUDIT TOTAL SCORE: 0
HOW OFTEN DO YOU HAVE A DRINK CONTAINING ALCOHOL: NEVER
HOW OFTEN DURING THE LAST YEAR HAVE YOU FOUND THAT YOU WERE NOT ABLE TO STOP DRINKING ONCE YOU HAD STARTED: NEVER

## 2025-01-31 ASSESSMENT — PAIN SCALES - GENERAL: PAINLEVEL_OUTOF10: 0-NO PAIN

## 2025-05-02 ENCOUNTER — TELEPHONE (OUTPATIENT)
Dept: PRIMARY CARE | Facility: CLINIC | Age: OVER 89
End: 2025-05-02
Payer: MEDICARE

## 2025-05-02 NOTE — TELEPHONE ENCOUNTER
So Tracey sent over a big packet stating pt is denied through Medicare part D.  I called the pharmacy and they stated that this has just been filled last month, but is probably needing a prior auth.  [facility] also stated that the pt pays $232 out of pocket every time they get a refill.  So do I send this to [facility] then? Please advice

## 2025-06-30 ENCOUNTER — TELEPHONE (OUTPATIENT)
Dept: PRIMARY CARE | Facility: CLINIC | Age: OVER 89
End: 2025-06-30
Payer: MEDICARE

## 2025-06-30 ENCOUNTER — OFFICE VISIT (OUTPATIENT)
Dept: URGENT CARE | Age: OVER 89
End: 2025-06-30
Payer: MEDICARE

## 2025-06-30 VITALS
HEIGHT: 72 IN | WEIGHT: 147 LBS | HEART RATE: 60 BPM | BODY MASS INDEX: 19.91 KG/M2 | SYSTOLIC BLOOD PRESSURE: 128 MMHG | DIASTOLIC BLOOD PRESSURE: 74 MMHG | OXYGEN SATURATION: 96 % | RESPIRATION RATE: 16 BRPM | TEMPERATURE: 97.7 F

## 2025-06-30 DIAGNOSIS — R41.82 ALTERED MENTAL STATUS, UNSPECIFIED ALTERED MENTAL STATUS TYPE: Primary | ICD-10-CM

## 2025-06-30 DIAGNOSIS — F99 MENTAL DISORDER, NOT OTHERWISE SPECIFIED: ICD-10-CM

## 2025-06-30 LAB
POC APPEARANCE, URINE: CLEAR
POC BILIRUBIN, URINE: NEGATIVE
POC BLOOD, URINE: ABNORMAL
POC COLOR, URINE: ABNORMAL
POC FINGERSTICK BLOOD GLUCOSE: 93 MG/DL (ref 70–100)
POC GLUCOSE, URINE: NEGATIVE MG/DL
POC KETONES, URINE: ABNORMAL MG/DL
POC LEUKOCYTES, URINE: NEGATIVE
POC NITRITE,URINE: NEGATIVE
POC PH, URINE: 6 PH
POC PROTEIN, URINE: ABNORMAL MG/DL
POC SPECIFIC GRAVITY, URINE: >=1.03
POC UROBILINOGEN, URINE: 0.2 EU/DL

## 2025-06-30 ASSESSMENT — PAIN SCALES - GENERAL: PAINLEVEL_OUTOF10: 0-NO PAIN

## 2025-06-30 NOTE — PROGRESS NOTES
Subjective   Patient ID: Kolton Acosta is a 89 y.o. male who presents for Altered Mental Status (Patients daughter states he is delusional. He was found in another neighbor. Confusion.-last night. Seeing people that aren't there and he was at another neighborhood started at 5:30AM ).  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 He is accompanied by his daughter, Tr. He was referred by Dr. Drew for evaluation of altered mental status.    He has a history of Sina Bonnet syndrome, associated with glaucoma, macular degeneration, and cataracts, and has undergone cataract surgery in the past. Recently, he has been experiencing vivid hallucinations, including seeing people in his apartment and colors, which have escalated to more distressing scenarios such as men planning to cause harm. These hallucinations began while he was at the [facility] for a heart surgery consultation months ago, though his daughter reports that these episodes have become more frequent and intense, with a recent incident involving him leaving his apartment and ending up on a stranger's porch.    ROS is negative unless otherwise stated in HPI.       Objective     /74 (BP Location: Right arm, Patient Position: Sitting)   Pulse 60   Temp 36.5 °C (97.7 °F) (Oral)   Resp 16   Ht 1.829 m (6')   Wt 66.7 kg (147 lb)   SpO2 96%   BMI 19.94 kg/m²        VS: As documented in the triage note and EMR flowsheet from this visit was reviewed  General: Well appearing. No acute distress.   Eyes:  Extraocular movements grossly intact. No scleral icterus.   Head: Atraumatic. Normocephalic.     Neck: No meningismus. No gross masses. Full movement through range of motion  ENT: Posterior oropharynx shows no erythema, exudate or edema.  Uvula is midline without edema.  No stridor or trismus  CV: Regular rhythm. No murmurs, rubs, gallops  appreciated.   Resp: Clear to auscultation bilaterally. No respiratory distress.    GI: Nontender. Soft. No masses. No rebound, rigidity or guarding.   MSK: Symmetric muscle bulk. No gross step offs or deformities.  Skin: Warm, dry. No rashes  Neuro: CN II-VII intact. A&O x3. Speech fluent. Alert. Moving all extremities. Ambulates with normal gait  Psych: Appropriate mood and affect for situation        Point of Care Test & Imaging Results from this visit  Results for orders placed or performed in visit on 06/30/25   POCT glucose manually resulted   Result Value Ref Range    POC Fingerstick Blood Glucose 93 70 - 100 mg/dl   POCT UA Automated manually resulted   Result Value Ref Range    POC Color, Urine Aixa (A) Straw, Yellow, Light-Yellow    POC Appearance, Urine Clear Clear    POC Glucose, Urine NEGATIVE NEGATIVE mg/dl    POC Bilirubin, Urine NEGATIVE NEGATIVE    POC Ketones, Urine TRACE (A) NEGATIVE mg/dl    POC Specific Gravity, Urine >=1.030 1.005 - 1.035    POC Blood, Urine MODERATE (2+) (A) NEGATIVE    POC PH, Urine 6.0 No Reference Range Established PH    POC Protein, Urine TRACE (A) NEGATIVE mg/dl    POC Urobilinogen, Urine 0.2 0.2, 1.0 EU/DL    Poc Nitrite, Urine NEGATIVE NEGATIVE    POC Leukocytes, Urine NEGATIVE NEGATIVE      Imaging  No results found.    Cardiology, Vascular, and Other Imaging  No other imaging results found for the past 2 days      Diagnostic study results (if any) were reviewed by Nessa Preston PA-C.    Assessment/Plan   Allergies, medications, history, and pertinent labs/EKGs/Imaging reviewed by Nessa Preston PA-C.     Assessment & Plan   Patient is a 89 year old male with  acute exacerbation of delusions with hallucinations. 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  Daughter at bedside notes recent worsening of delusions with an incident yesterday in which patient ended up on another person's front porch.  Patient currently denying any  complaints.  Vitals are stable.  No focal neurological deficits.  No infection on UA here. Blood glucose WNL.  Patient and family member advised to go to the emergency department to have further evaluation with laboratory and CT imaging to rule out any other cause of patient's acutely worsening delusions.  He will go to [facility] for further evaluation and care via private vehicle in stable condition.    Orders and Diagnoses  Diagnoses and all orders for this visit:  Altered mental status, unspecified altered mental status type  -     POCT glucose manually resulted  -     POCT UA Automated manually resulted  Mental disorder, not otherwise specified  -     POCT glucose manually resulted        Disposition:  ED      Nessa Preston PA-C     This medical note was created with the assistance of artificial intelligence (AI) for documentation purposes. The content has been reviewed and confirmed by the healthcare provider for accuracy and completeness. Patient consented to the use of audio recording and use of AI during their visit.

## 2025-06-30 NOTE — TELEPHONE ENCOUNTER
LV 1/31/25, [place] 7/31/25    Pt has become more delusional and having more hallucinations.  Pt believes that someone was inside his apartment, and he has been wondering around the neighborhood.  He was found today on someone's front porch.  I advised pt's daughter to take him to the hospital.  She stated that the pt has had all the tests and doesn't want to sit for hours today.  She stated that it may be a uti.  I told her to try urgent care for the uti.  She stated that she will take him there soon.

## 2025-07-31 ENCOUNTER — OFFICE VISIT (OUTPATIENT)
Dept: PRIMARY CARE | Facility: CLINIC | Age: OVER 89
End: 2025-07-31
Payer: MEDICARE

## 2025-07-31 VITALS
OXYGEN SATURATION: 88 % | WEIGHT: 134 LBS | TEMPERATURE: 97.2 F | HEIGHT: 71 IN | SYSTOLIC BLOOD PRESSURE: 130 MMHG | DIASTOLIC BLOOD PRESSURE: 74 MMHG | BODY MASS INDEX: 18.76 KG/M2

## 2025-07-31 DIAGNOSIS — Z95.2 S/P TAVR (TRANSCATHETER AORTIC VALVE REPLACEMENT): ICD-10-CM

## 2025-07-31 DIAGNOSIS — I10 ESSENTIAL HYPERTENSION: Primary | ICD-10-CM

## 2025-07-31 DIAGNOSIS — I48.91 ATRIAL FIBRILLATION, UNSPECIFIED TYPE (MULTI): ICD-10-CM

## 2025-07-31 DIAGNOSIS — I35.0 NONRHEUMATIC AORTIC VALVE STENOSIS: ICD-10-CM

## 2025-07-31 PROCEDURE — 99214 OFFICE O/P EST MOD 30 MIN: CPT | Performed by: INTERNAL MEDICINE

## 2025-07-31 PROCEDURE — 1126F AMNT PAIN NOTED NONE PRSNT: CPT | Performed by: INTERNAL MEDICINE

## 2025-07-31 PROCEDURE — 1036F TOBACCO NON-USER: CPT | Performed by: INTERNAL MEDICINE

## 2025-07-31 PROCEDURE — 3078F DIAST BP <80 MM HG: CPT | Performed by: INTERNAL MEDICINE

## 2025-07-31 PROCEDURE — 3075F SYST BP GE 130 - 139MM HG: CPT | Performed by: INTERNAL MEDICINE

## 2025-07-31 PROCEDURE — G2211 COMPLEX E/M VISIT ADD ON: HCPCS | Performed by: INTERNAL MEDICINE

## 2025-07-31 PROCEDURE — 1159F MED LIST DOCD IN RCRD: CPT | Performed by: INTERNAL MEDICINE

## 2025-07-31 ASSESSMENT — ENCOUNTER SYMPTOMS
LOSS OF SENSATION IN FEET: 0
ABDOMINAL PAIN: 0
FEVER: 0
DEPRESSION: 0
SHORTNESS OF BREATH: 0
OCCASIONAL FEELINGS OF UNSTEADINESS: 1

## 2025-07-31 ASSESSMENT — PAIN SCALES - GENERAL: PAINLEVEL_OUTOF10: 0-NO PAIN

## 2025-07-31 NOTE — PROGRESS NOTES
[facility]: [facility]  PROGRESS NOTE      Kolton Acosta is a 89 y.o. male that is presenting today for Follow-up (6 mos fu a fib).    Assessment/Plan   Diagnoses and all orders for this visit:  Essential hypertension  Atrial fibrillation, unspecified type (Multi)  -     apixaban (Eliquis) 5 mg tablet; Take 0.5 tablets (2.5 mg) by mouth 2 times a day.  S/P TAVR (transcatheter aortic valve replacement)  Nonrheumatic aortic valve stenosis  Other orders  -     Follow Up In Primary Care - Established    We discussed the apixaban.  He has been having quite a few falls and he has a large hematoma on his left back which is slowly resolving.  There is significant fear that he may have serious bleeding and injury with a major fall.  Given his weight loss and age, it would now make sense to change the apixaban dose to 2.5 mg twice a day as per guidelines.  His daughter expresses understanding.    Weight loss.  We talked about nutritional aspects and he is going to start eating a supplement daily and I think being at the assisted living and having 3 hot meals a day will be very helpful for    Subjective   HPI  Moving into assisted living today.  He did have a fall and he did have 1 episode of wandering.  He is feeling very optimistic about the assisted living as they are his caregivers.  I did talk to his daughter on the phone fairly extensively as well.    Review of Systems   Constitutional:  Negative for fever.   Respiratory:  Negative for shortness of breath.    Cardiovascular:  Negative for chest pain.   Gastrointestinal:  Negative for abdominal pain.   All other systems reviewed and are negative.     Objective   Vitals:    07/31/25 1606   BP: 130/74   Temp: 36.2 °C (97.2 °F)   SpO2: (!) 88%      Body mass index is 18.69 kg/m².  Physical Exam  Vitals reviewed.   Constitutional:       Appearance: Normal appearance.     Cardiovascular:      Rate and Rhythm: Normal rate and regular rhythm.      Heart  "sounds: No murmur heard.  Pulmonary:      Breath sounds: Normal breath sounds. No wheezing, rhonchi or rales.     Musculoskeletal:      Right lower leg: No edema.      Left lower leg: No edema.       There are scattered abrasions and there is contusion on his left back with a hematoma.    Diagnostic Results   Lab Results   Component Value Date    GLUCOSE 85 10/03/2024    CALCIUM 10.1 10/03/2024     10/03/2024    K 5.1 10/03/2024    CO2 32 10/03/2024    CL 97 (L) 10/03/2024    BUN 15 10/03/2024    CREATININE 0.64 10/03/2024     Lab Results   Component Value Date    ALT 10 10/03/2024    AST 23 10/03/2024    ALKPHOS 79 10/03/2024    BILITOT 2.3 (H) 10/03/2024     Lab Results   Component Value Date    WBC 6.2 10/03/2024    HGB 17.0 10/03/2024    HCT 51.1 10/03/2024    MCV 99 10/03/2024     (L) 10/03/2024     Lab Results   Component Value Date    CHOL 162 12/11/2023    CHOL 168 11/09/2022    CHOL 197 11/04/2021     Lab Results   Component Value Date    HDL 60.0 12/11/2023    HDL 53 11/09/2022    HDL 52 11/04/2021     Lab Results   Component Value Date    LDLCALC 83 12/11/2023    LDLCALC 94 11/09/2022    LDLCALC 120 11/04/2021     Lab Results   Component Value Date    TRIG 97 12/11/2023    TRIG 106 11/09/2022    TRIG 125 11/04/2021     No components found for: \"CHOLHDL\"  Lab Results   Component Value Date    HGBA1C 5.6 08/13/2019     Other labs not included in the list above were reviewed either before or during this encounter.    History    Medical History[1]  Surgical History[2]  Family History[3]  Social History     Socioeconomic History    Marital status:      Spouse name: Not on file    Number of children: Not on file    Years of education: Not on file    Highest education level: Not on file   Occupational History    Not on file   Tobacco Use    Smoking status: Never     Passive exposure: Never    Smokeless tobacco: Never   Vaping Use    Vaping status: Never Used   Substance and Sexual Activity    " Alcohol use: Never    Drug use: Never    Sexual activity: Not Currently     Partners: Female   Other Topics Concern    Not on file   Social History Narrative    Not on file     Social Drivers of Health     Financial Resource Strain: Not on file   Food Insecurity: Not on file   Transportation Needs: Not on file   Physical Activity: Not on file   Stress: Not on file   Social Connections: Not on file   Intimate Partner Violence: Not on file   Housing Stability: Not on file     Allergies[4]  Medications Ordered Prior to Encounter[5]  Immunization History   Administered Date(s) Administered    COVID-19, mRNA, LNP-S, PF, 30 mcg/0.3 mL dose 11/01/2021    Flu vaccine, quadrivalent, high-dose, preservative free, age 65y+ (FLUZONE) 10/29/2020, 09/26/2021, 10/28/2023    Flu vaccine, trivalent, preservative free, HIGH-DOSE, age 65y+ (Fluzone) 11/18/2015, 11/10/2016, 10/30/2017, 10/24/2018, 10/14/2019, 10/03/2024    Influenza, Seasonal, Quadrivalent, Adjuvanted 11/01/2022    Influenza, seasonal, injectable 09/26/2014    Moderna SARS-CoV-2 Vaccination 01/20/2021, 02/17/2021    Pfizer COVID-19 vaccine, 12 years and older, (30mcg/0.3mL) (Comirnaty) 10/28/2023    Pfizer COVID-19 vaccine, bivalent, age 12 years and older (30 mcg/0.3 mL) 12/14/2022    Pneumococcal conjugate vaccine, 13-valent (PREVNAR 13) 08/24/2015    Pneumococcal polysaccharide vaccine, 23-valent, age 2 years and older (PNEUMOVAX 23) 08/12/2014     Patient's medical history was reviewed and updated either before or during this encounter.       Ajay Griffith MD       [1]   Past Medical History:  Diagnosis Date    A-fib (Multi)     Anxiety     Aortic stenosis 09/10/2024    Cataract     Glaucoma     HL (hearing loss)    [2]   Past Surgical History:  Procedure Laterality Date    ANOMALOUS PULMONARY VENOUS RETURN REPAIR, TOTAL      AORTIC VALVE REPLACEMENT  09/10/2024    CARDIAC CATHETERIZATION      CARDIAC VALVE REPLACEMENT      WISDOM TOOTH EXTRACTION  01/2024     12 teeth removed   [3]   Family History  Problem Relation Name Age of Onset    Cancer Mother Jeanette     Colon cancer Mother Jeanette     Heart attack Father     [4] No Known Allergies  [5]   Current Outpatient Medications on File Prior to Visit   Medication Sig Dispense Refill    cholecalciferol (Vitamin D-3) 50 MCG (2000 UT) tablet Take 1 tablet (2,000 Units) by mouth once daily.      cyanocobalamin (Vitamin B-12) 1,000 mcg tablet Take 1 tablet (1,000 mcg) by mouth once daily.      timolol (Timoptic) 0.5 % ophthalmic solution       [DISCONTINUED] apixaban (Eliquis) 5 mg tablet Take 1 tablet (5 mg) by mouth 2 times a day. 180 tablet 3     No current facility-administered medications on file prior to visit.

## 2025-08-04 ENCOUNTER — TELEPHONE (OUTPATIENT)
Dept: PRIMARY CARE | Facility: CLINIC | Age: OVER 89
End: 2025-08-04
Payer: MEDICARE

## 2025-08-04 DIAGNOSIS — I48.91 ATRIAL FIBRILLATION, UNSPECIFIED TYPE (MULTI): ICD-10-CM

## 2025-08-06 ENCOUNTER — APPOINTMENT (OUTPATIENT)
Dept: DERMATOLOGY | Facility: CLINIC | Age: OVER 89
End: 2025-08-06
Payer: MEDICARE

## 2025-08-07 ENCOUNTER — APPOINTMENT (OUTPATIENT)
Dept: RADIOLOGY | Facility: HOSPITAL | Age: OVER 89
End: 2025-08-07
Payer: MEDICARE

## 2025-08-07 ENCOUNTER — TELEPHONE (OUTPATIENT)
Dept: PRIMARY CARE | Facility: CLINIC | Age: OVER 89
End: 2025-08-07

## 2025-08-07 ENCOUNTER — HOSPITAL ENCOUNTER (OUTPATIENT)
Facility: HOSPITAL | Age: OVER 89
Setting detail: OBSERVATION
Discharge: HOME HEALTH | End: 2025-08-07
Attending: EMERGENCY MEDICINE | Admitting: INTERNAL MEDICINE
Payer: MEDICARE

## 2025-08-07 ENCOUNTER — APPOINTMENT (OUTPATIENT)
Dept: CARDIOLOGY | Facility: HOSPITAL | Age: OVER 89
End: 2025-08-07
Payer: MEDICARE

## 2025-08-07 DIAGNOSIS — G62.9 POLYNEUROPATHY, UNSPECIFIED: ICD-10-CM

## 2025-08-07 DIAGNOSIS — R53.1 WEAKNESS: ICD-10-CM

## 2025-08-07 DIAGNOSIS — E55.9 VITAMIN D DEFICIENCY: ICD-10-CM

## 2025-08-07 DIAGNOSIS — R53.1 GENERALIZED WEAKNESS: ICD-10-CM

## 2025-08-07 DIAGNOSIS — G45.8 ACUTE CEREBROVASCULAR INSUFFICIENCY TRANSIENT FOCAL NEUROLOGIC DEFICIT: ICD-10-CM

## 2025-08-07 DIAGNOSIS — R09.89 OTHER SPECIFIED SYMPTOMS AND SIGNS INVOLVING THE CIRCULATORY AND RESPIRATORY SYSTEMS: ICD-10-CM

## 2025-08-07 DIAGNOSIS — G45.9 TIA (TRANSIENT ISCHEMIC ATTACK): ICD-10-CM

## 2025-08-07 DIAGNOSIS — R27.8 COORDINATION IMPAIRMENT: Primary | ICD-10-CM

## 2025-08-07 LAB
ALBUMIN SERPL BCP-MCNC: 3.3 G/DL (ref 3.4–5)
ALP SERPL-CCNC: 74 U/L (ref 33–136)
ALT SERPL W P-5'-P-CCNC: 13 U/L (ref 10–52)
ANION GAP SERPL CALCULATED.3IONS-SCNC: 7 MMOL/L (ref 10–20)
APPEARANCE UR: ABNORMAL
AST SERPL W P-5'-P-CCNC: 28 U/L (ref 9–39)
BACTERIA #/AREA URNS AUTO: ABNORMAL /HPF
BASOPHILS # BLD AUTO: 0.05 X10*3/UL (ref 0–0.1)
BASOPHILS NFR BLD AUTO: 1.2 %
BILIRUB SERPL-MCNC: 2.6 MG/DL (ref 0–1.2)
BILIRUB UR STRIP.AUTO-MCNC: NEGATIVE MG/DL
BUN SERPL-MCNC: 17 MG/DL (ref 6–23)
CALCIUM SERPL-MCNC: 9 MG/DL (ref 8.6–10.3)
CAOX CRY #/AREA UR COMP ASSIST: ABNORMAL /HPF
CHLORIDE SERPL-SCNC: 93 MMOL/L (ref 98–107)
CO2 SERPL-SCNC: 33 MMOL/L (ref 21–32)
COLOR UR: YELLOW
CREAT SERPL-MCNC: 0.63 MG/DL (ref 0.5–1.3)
EGFRCR SERPLBLD CKD-EPI 2021: 85 ML/MIN/1.73M*2
EOSINOPHIL # BLD AUTO: 0.05 X10*3/UL (ref 0–0.4)
EOSINOPHIL NFR BLD AUTO: 1.2 %
ERYTHROCYTE [DISTWIDTH] IN BLOOD BY AUTOMATED COUNT: 14.4 % (ref 11.5–14.5)
GLUCOSE BLD MANUAL STRIP-MCNC: 140 MG/DL (ref 74–99)
GLUCOSE SERPL-MCNC: 113 MG/DL (ref 74–99)
GLUCOSE UR STRIP.AUTO-MCNC: NORMAL MG/DL
HCT VFR BLD AUTO: 43.1 % (ref 41–52)
HGB BLD-MCNC: 14.7 G/DL (ref 13.5–17.5)
HYALINE CASTS #/AREA URNS AUTO: ABNORMAL /LPF
IMM GRANULOCYTES # BLD AUTO: 0 X10*3/UL (ref 0–0.5)
IMM GRANULOCYTES NFR BLD AUTO: 0 % (ref 0–0.9)
KETONES UR STRIP.AUTO-MCNC: NEGATIVE MG/DL
LEUKOCYTE ESTERASE UR QL STRIP.AUTO: NEGATIVE
LYMPHOCYTES # BLD AUTO: 0.82 X10*3/UL (ref 0.8–3)
LYMPHOCYTES NFR BLD AUTO: 20 %
MAGNESIUM SERPL-MCNC: 1.79 MG/DL (ref 1.6–2.4)
MCH RBC QN AUTO: 31.8 PG (ref 26–34)
MCHC RBC AUTO-ENTMCNC: 34.1 G/DL (ref 32–36)
MCV RBC AUTO: 93 FL (ref 80–100)
MONOCYTES # BLD AUTO: 0.39 X10*3/UL (ref 0.05–0.8)
MONOCYTES NFR BLD AUTO: 9.5 %
MUCOUS THREADS #/AREA URNS AUTO: ABNORMAL /LPF
NEUTROPHILS # BLD AUTO: 2.8 X10*3/UL (ref 1.6–5.5)
NEUTROPHILS NFR BLD AUTO: 68.1 %
NITRITE UR QL STRIP.AUTO: NEGATIVE
NRBC BLD-RTO: 0 /100 WBCS (ref 0–0)
PH UR STRIP.AUTO: 6 [PH]
PLATELET # BLD AUTO: 158 X10*3/UL (ref 150–450)
POTASSIUM SERPL-SCNC: 3.1 MMOL/L (ref 3.5–5.3)
PROT SERPL-MCNC: 6.4 G/DL (ref 6.4–8.2)
PROT UR STRIP.AUTO-MCNC: NEGATIVE MG/DL
RBC # BLD AUTO: 4.62 X10*6/UL (ref 4.5–5.9)
RBC # UR STRIP.AUTO: ABNORMAL MG/DL
RBC #/AREA URNS AUTO: >20 /HPF
SODIUM SERPL-SCNC: 130 MMOL/L (ref 136–145)
SP GR UR STRIP.AUTO: 1.02
SQUAMOUS #/AREA URNS AUTO: ABNORMAL /HPF
UROBILINOGEN UR STRIP.AUTO-MCNC: ABNORMAL MG/DL
WBC # BLD AUTO: 4.1 X10*3/UL (ref 4.4–11.3)
WBC #/AREA URNS AUTO: ABNORMAL /HPF

## 2025-08-07 PROCEDURE — 71045 X-RAY EXAM CHEST 1 VIEW: CPT | Performed by: RADIOLOGY

## 2025-08-07 PROCEDURE — 71045 X-RAY EXAM CHEST 1 VIEW: CPT

## 2025-08-07 PROCEDURE — 70551 MRI BRAIN STEM W/O DYE: CPT | Performed by: STUDENT IN AN ORGANIZED HEALTH CARE EDUCATION/TRAINING PROGRAM

## 2025-08-07 PROCEDURE — G0378 HOSPITAL OBSERVATION PER HR: HCPCS

## 2025-08-07 PROCEDURE — 36415 COLL VENOUS BLD VENIPUNCTURE: CPT | Performed by: EMERGENCY MEDICINE

## 2025-08-07 PROCEDURE — 70547 MR ANGIOGRAPHY NECK W/O DYE: CPT

## 2025-08-07 PROCEDURE — 2500000001 HC RX 250 WO HCPCS SELF ADMINISTERED DRUGS (ALT 637 FOR MEDICARE OP): Performed by: EMERGENCY MEDICINE

## 2025-08-07 PROCEDURE — 99285 EMERGENCY DEPT VISIT HI MDM: CPT | Mod: 25 | Performed by: EMERGENCY MEDICINE

## 2025-08-07 PROCEDURE — 81001 URINALYSIS AUTO W/SCOPE: CPT | Performed by: EMERGENCY MEDICINE

## 2025-08-07 PROCEDURE — 2500000004 HC RX 250 GENERAL PHARMACY W/ HCPCS (ALT 636 FOR OP/ED): Performed by: EMERGENCY MEDICINE

## 2025-08-07 PROCEDURE — 82947 ASSAY GLUCOSE BLOOD QUANT: CPT

## 2025-08-07 PROCEDURE — 96360 HYDRATION IV INFUSION INIT: CPT

## 2025-08-07 PROCEDURE — 70547 MR ANGIOGRAPHY NECK W/O DYE: CPT | Performed by: STUDENT IN AN ORGANIZED HEALTH CARE EDUCATION/TRAINING PROGRAM

## 2025-08-07 PROCEDURE — 70450 CT HEAD/BRAIN W/O DYE: CPT | Performed by: RADIOLOGY

## 2025-08-07 PROCEDURE — 70450 CT HEAD/BRAIN W/O DYE: CPT

## 2025-08-07 PROCEDURE — 70544 MR ANGIOGRAPHY HEAD W/O DYE: CPT | Mod: 59

## 2025-08-07 PROCEDURE — 81003 URINALYSIS AUTO W/O SCOPE: CPT | Performed by: EMERGENCY MEDICINE

## 2025-08-07 PROCEDURE — 70551 MRI BRAIN STEM W/O DYE: CPT

## 2025-08-07 PROCEDURE — 99223 1ST HOSP IP/OBS HIGH 75: CPT | Performed by: NURSE PRACTITIONER

## 2025-08-07 PROCEDURE — 83735 ASSAY OF MAGNESIUM: CPT | Performed by: EMERGENCY MEDICINE

## 2025-08-07 PROCEDURE — 96361 HYDRATE IV INFUSION ADD-ON: CPT

## 2025-08-07 PROCEDURE — 80053 COMPREHEN METABOLIC PANEL: CPT | Performed by: EMERGENCY MEDICINE

## 2025-08-07 PROCEDURE — 85025 COMPLETE CBC W/AUTO DIFF WBC: CPT | Performed by: EMERGENCY MEDICINE

## 2025-08-07 PROCEDURE — 70544 MR ANGIOGRAPHY HEAD W/O DYE: CPT | Performed by: STUDENT IN AN ORGANIZED HEALTH CARE EDUCATION/TRAINING PROGRAM

## 2025-08-07 RX ORDER — POTASSIUM CHLORIDE 1.5 G/1.58G
40 POWDER, FOR SOLUTION ORAL ONCE
Status: COMPLETED | OUTPATIENT
Start: 2025-08-07 | End: 2025-08-07

## 2025-08-07 RX ORDER — ONDANSETRON HYDROCHLORIDE 2 MG/ML
4 INJECTION, SOLUTION INTRAVENOUS EVERY 8 HOURS PRN
Status: ACTIVE | OUTPATIENT
Start: 2025-08-07

## 2025-08-07 RX ORDER — NAPROXEN SODIUM 220 MG/1
162 TABLET, FILM COATED ORAL ONCE
Status: COMPLETED | OUTPATIENT
Start: 2025-08-07 | End: 2025-08-07

## 2025-08-07 RX ORDER — BISACODYL 5 MG
10 TABLET, DELAYED RELEASE (ENTERIC COATED) ORAL DAILY PRN
Status: ACTIVE | OUTPATIENT
Start: 2025-08-07

## 2025-08-07 RX ORDER — NAPROXEN SODIUM 220 MG/1
81 TABLET, FILM COATED ORAL DAILY
Status: DISCONTINUED | OUTPATIENT
Start: 2025-08-08 | End: 2025-08-09

## 2025-08-07 RX ORDER — ACETAMINOPHEN 160 MG/5ML
650 SOLUTION ORAL EVERY 4 HOURS PRN
Status: ACTIVE | OUTPATIENT
Start: 2025-08-07

## 2025-08-07 RX ORDER — ONDANSETRON 4 MG/1
4 TABLET, FILM COATED ORAL EVERY 8 HOURS PRN
Status: ACTIVE | OUTPATIENT
Start: 2025-08-07

## 2025-08-07 RX ORDER — ATORVASTATIN CALCIUM 40 MG/1
40 TABLET, FILM COATED ORAL NIGHTLY
Status: DISPENSED | OUTPATIENT
Start: 2025-08-08

## 2025-08-07 RX ORDER — TIMOLOL MALEATE 2.5 MG/ML
1 SOLUTION/ DROPS OPHTHALMIC 2 TIMES DAILY
Status: DISPENSED | OUTPATIENT
Start: 2025-08-07

## 2025-08-07 RX ORDER — ASPIRIN 81 MG/1
81 TABLET ORAL DAILY
Status: DISCONTINUED | OUTPATIENT
Start: 2025-08-08 | End: 2025-08-09

## 2025-08-07 RX ORDER — ASPIRIN 300 MG/1
300 SUPPOSITORY RECTAL DAILY
Status: DISCONTINUED | OUTPATIENT
Start: 2025-08-08 | End: 2025-08-09

## 2025-08-07 RX ORDER — ATORVASTATIN CALCIUM 40 MG/1
40 TABLET, FILM COATED ORAL ONCE
Status: COMPLETED | OUTPATIENT
Start: 2025-08-07 | End: 2025-08-07

## 2025-08-07 RX ORDER — ACETAMINOPHEN 325 MG/1
650 TABLET ORAL EVERY 4 HOURS PRN
Status: DISPENSED | OUTPATIENT
Start: 2025-08-07

## 2025-08-07 RX ADMIN — POTASSIUM CHLORIDE 40 MEQ: 1.5 POWDER, FOR SOLUTION ORAL at 16:04

## 2025-08-07 RX ADMIN — ASPIRIN 162 MG: 81 TABLET, CHEWABLE ORAL at 16:04

## 2025-08-07 RX ADMIN — ATORVASTATIN CALCIUM 40 MG: 40 TABLET, FILM COATED ORAL at 16:04

## 2025-08-07 RX ADMIN — SODIUM CHLORIDE 1000 ML: 900 INJECTION, SOLUTION INTRAVENOUS at 11:48

## 2025-08-07 SDOH — ECONOMIC STABILITY: FOOD INSECURITY: WITHIN THE PAST 12 MONTHS, THE FOOD YOU BOUGHT JUST DIDN'T LAST AND YOU DIDN'T HAVE MONEY TO GET MORE.: NEVER TRUE

## 2025-08-07 SDOH — ECONOMIC STABILITY: FOOD INSECURITY: HOW HARD IS IT FOR YOU TO PAY FOR THE VERY BASICS LIKE FOOD, HOUSING, MEDICAL CARE, AND HEATING?: NOT HARD AT ALL

## 2025-08-07 SDOH — ECONOMIC STABILITY: FOOD INSECURITY: WITHIN THE PAST 12 MONTHS, YOU WORRIED THAT YOUR FOOD WOULD RUN OUT BEFORE YOU GOT THE MONEY TO BUY MORE.: NEVER TRUE

## 2025-08-07 SDOH — SOCIAL STABILITY: SOCIAL INSECURITY: DOES ANYONE TRY TO KEEP YOU FROM HAVING/CONTACTING OTHER FRIENDS OR DOING THINGS OUTSIDE YOUR HOME?: NO

## 2025-08-07 SDOH — SOCIAL STABILITY: SOCIAL INSECURITY
WITHIN THE LAST YEAR, HAVE YOU BEEN RAPED OR FORCED TO HAVE ANY KIND OF SEXUAL ACTIVITY BY YOUR PARTNER OR EX-PARTNER?: NO

## 2025-08-07 SDOH — ECONOMIC STABILITY: HOUSING INSECURITY: AT ANY TIME IN THE PAST 12 MONTHS, WERE YOU HOMELESS OR LIVING IN A SHELTER (INCLUDING NOW)?: NO

## 2025-08-07 SDOH — SOCIAL STABILITY: SOCIAL INSECURITY: WITHIN THE LAST YEAR, HAVE YOU BEEN HUMILIATED OR EMOTIONALLY ABUSED IN OTHER WAYS BY YOUR PARTNER OR EX-PARTNER?: NO

## 2025-08-07 SDOH — SOCIAL STABILITY: SOCIAL INSECURITY: HAVE YOU HAD THOUGHTS OF HARMING ANYONE ELSE?: NO

## 2025-08-07 SDOH — SOCIAL STABILITY: SOCIAL INSECURITY
WITHIN THE LAST YEAR, HAVE YOU BEEN KICKED, HIT, SLAPPED, OR OTHERWISE PHYSICALLY HURT BY YOUR PARTNER OR EX-PARTNER?: NO

## 2025-08-07 SDOH — SOCIAL STABILITY: SOCIAL INSECURITY: WITHIN THE LAST YEAR, HAVE YOU BEEN AFRAID OF YOUR PARTNER OR EX-PARTNER?: NO

## 2025-08-07 SDOH — SOCIAL STABILITY: SOCIAL INSECURITY: WERE YOU ABLE TO COMPLETE ALL THE BEHAVIORAL HEALTH SCREENINGS?: YES

## 2025-08-07 SDOH — ECONOMIC STABILITY: HOUSING INSECURITY: IN THE LAST 12 MONTHS, WAS THERE A TIME WHEN YOU WERE NOT ABLE TO PAY THE MORTGAGE OR RENT ON TIME?: NO

## 2025-08-07 SDOH — ECONOMIC STABILITY: HOUSING INSECURITY: IN THE PAST 12 MONTHS, HOW MANY TIMES HAVE YOU MOVED WHERE YOU WERE LIVING?: 0

## 2025-08-07 SDOH — ECONOMIC STABILITY: INCOME INSECURITY: IN THE PAST 12 MONTHS HAS THE ELECTRIC, GAS, OIL, OR WATER COMPANY THREATENED TO SHUT OFF SERVICES IN YOUR HOME?: NO

## 2025-08-07 SDOH — SOCIAL STABILITY: SOCIAL INSECURITY: HAS ANYONE EVER THREATENED TO HURT YOUR FAMILY OR YOUR PETS?: NO

## 2025-08-07 SDOH — SOCIAL STABILITY: SOCIAL INSECURITY: ARE THERE ANY APPARENT SIGNS OF INJURIES/BEHAVIORS THAT COULD BE RELATED TO ABUSE/NEGLECT?: NO

## 2025-08-07 SDOH — SOCIAL STABILITY: SOCIAL INSECURITY: DO YOU FEEL ANYONE HAS EXPLOITED OR TAKEN ADVANTAGE OF YOU FINANCIALLY OR OF YOUR PERSONAL PROPERTY?: NO

## 2025-08-07 SDOH — SOCIAL STABILITY: SOCIAL INSECURITY: ARE YOU OR HAVE YOU BEEN THREATENED OR ABUSED PHYSICALLY, EMOTIONALLY, OR SEXUALLY BY ANYONE?: NO

## 2025-08-07 SDOH — SOCIAL STABILITY: SOCIAL INSECURITY: DO YOU FEEL UNSAFE GOING BACK TO THE PLACE WHERE YOU ARE LIVING?: NO

## 2025-08-07 SDOH — SOCIAL STABILITY: SOCIAL INSECURITY: ABUSE: ADULT

## 2025-08-07 SDOH — ECONOMIC STABILITY: TRANSPORTATION INSECURITY: IN THE PAST 12 MONTHS, HAS LACK OF TRANSPORTATION KEPT YOU FROM MEDICAL APPOINTMENTS OR FROM GETTING MEDICATIONS?: NO

## 2025-08-07 ASSESSMENT — ACTIVITIES OF DAILY LIVING (ADL)
DRESSING YOURSELF: NEEDS ASSISTANCE
ADEQUATE_TO_COMPLETE_ADL: YES
LACK_OF_TRANSPORTATION: NO
TOILETING: NEEDS ASSISTANCE
BATHING: NEEDS ASSISTANCE
JUDGMENT_ADEQUATE_SAFELY_COMPLETE_DAILY_ACTIVITIES: YES
GROOMING: NEEDS ASSISTANCE
WALKS IN HOME: NEEDS ASSISTANCE
BATHING: NEEDS ASSISTANCE
TOILETING: NEEDS ASSISTANCE
HEARING - LEFT EAR: HEARING AID
HEARING - RIGHT EAR: HEARING AID
FEEDING YOURSELF: NEEDS ASSISTANCE
JUDGMENT_ADEQUATE_SAFELY_COMPLETE_DAILY_ACTIVITIES: YES
GROOMING: NEEDS ASSISTANCE
HEARING - RIGHT EAR: HEARING AID
PATIENT'S MEMORY ADEQUATE TO SAFELY COMPLETE DAILY ACTIVITIES?: YES
DRESSING YOURSELF: NEEDS ASSISTANCE
FEEDING YOURSELF: NEEDS ASSISTANCE
LACK_OF_TRANSPORTATION: NO
ADEQUATE_TO_COMPLETE_ADL: YES
WALKS IN HOME: NEEDS ASSISTANCE
HEARING - LEFT EAR: HEARING AID
PATIENT'S MEMORY ADEQUATE TO SAFELY COMPLETE DAILY ACTIVITIES?: YES

## 2025-08-07 ASSESSMENT — COGNITIVE AND FUNCTIONAL STATUS - GENERAL
DRESSING REGULAR LOWER BODY CLOTHING: A LOT
EATING MEALS: A LOT
MOVING TO AND FROM BED TO CHAIR: A LOT
MOVING FROM LYING ON BACK TO SITTING ON SIDE OF FLAT BED WITH BEDRAILS: A LITTLE
PERSONAL GROOMING: A LOT
TOILETING: A LOT
PATIENT BASELINE BEDBOUND: NO
CLIMB 3 TO 5 STEPS WITH RAILING: TOTAL
HELP NEEDED FOR BATHING: A LOT
TURNING FROM BACK TO SIDE WHILE IN FLAT BAD: A LOT
MOBILITY SCORE: 12
WALKING IN HOSPITAL ROOM: A LOT
STANDING UP FROM CHAIR USING ARMS: A LOT
DAILY ACTIVITIY SCORE: 13
DRESSING REGULAR UPPER BODY CLOTHING: A LITTLE

## 2025-08-07 ASSESSMENT — ENCOUNTER SYMPTOMS
NUMBNESS: 1
FATIGUE: 0
FEVER: 0
RHINORRHEA: 0
DIZZINESS: 0
LIGHT-HEADEDNESS: 0
DYSURIA: 0
ABDOMINAL PAIN: 0
PALPITATIONS: 0
NAUSEA: 0
APPETITE CHANGE: 0
VOMITING: 0
SHORTNESS OF BREATH: 0
CHEST TIGHTNESS: 0
ABDOMINAL DISTENTION: 0
CONSTIPATION: 0
COUGH: 0
WEAKNESS: 1
DIARRHEA: 0

## 2025-08-07 ASSESSMENT — LIFESTYLE VARIABLES
HOW OFTEN DO YOU HAVE 6 OR MORE DRINKS ON ONE OCCASION: NEVER
HOW OFTEN DO YOU HAVE A DRINK CONTAINING ALCOHOL: NEVER
SKIP TO QUESTIONS 9-10: 1
AUDIT-C TOTAL SCORE: 0
HOW MANY STANDARD DRINKS CONTAINING ALCOHOL DO YOU HAVE ON A TYPICAL DAY: PATIENT DOES NOT DRINK
AUDIT-C TOTAL SCORE: 0

## 2025-08-07 ASSESSMENT — PATIENT HEALTH QUESTIONNAIRE - PHQ9
2. FEELING DOWN, DEPRESSED OR HOPELESS: SEVERAL DAYS
1. LITTLE INTEREST OR PLEASURE IN DOING THINGS: SEVERAL DAYS
SUM OF ALL RESPONSES TO PHQ9 QUESTIONS 1 & 2: 2

## 2025-08-07 ASSESSMENT — PAIN SCALES - GENERAL
PAINLEVEL_OUTOF10: 0 - NO PAIN
PAINLEVEL_OUTOF10: 0 - NO PAIN

## 2025-08-07 ASSESSMENT — PAIN - FUNCTIONAL ASSESSMENT
PAIN_FUNCTIONAL_ASSESSMENT: 0-10
PAIN_FUNCTIONAL_ASSESSMENT: 0-10

## 2025-08-07 NOTE — ED PROVIDER NOTES
Emergency Department Transition of care/ Progress Note         Assumed care of patient that was signed out to me in stable condition with work-up /disposition pending.    History/Exam limitations: none.   Additional history was obtained from past medical records    HPI: Kolton Acosta  is a 89 y.o. with a complaint of   Chief Complaint   Patient presents with   • Weakness, Gen   Patient has had generalized weakness.  He has difficulty with coordination, unable to ambulate effectively and safely.  Also difficulty coordinating eating.  Concerned about CVA process however symptoms are chronic, out of tPA window.  Past medical history and surgical history reviewed and as documented.  History reviewed and as noted. past medical records reviewed.    Past medical records, triage/nursing notes and vital signs reviewed as available and as noted above.    PHYSICAL EXAM  Vital Signs reviewed and noted to be within normal limits. the patient is not hypoxic.  -General: Alert, no acute distress, patient resting comfortably  -Skin: warm, intact, no pallor noted  -Head: Normocephalic, atraumatic  -Eye: Normal conjunctiva  -Cardiac: Normal peripheral perfusion  -Respiratory: No acute distress  -Musculoskeletal: No deformity, full ROM.  -Neurological: alert and oriented, normal motor observed however abnormal coordination appreciated.  -Psychiatric: Cooperative        Labs and imaging reviewed by me  and note     Labs Reviewed   CBC WITH AUTO DIFFERENTIAL - Abnormal       Result Value    WBC 4.1 (*)     nRBC 0.0      RBC 4.62      Hemoglobin 14.7      Hematocrit 43.1      MCV 93      MCH 31.8      MCHC 34.1      RDW 14.4      Platelets 158      Neutrophils % 68.1      Immature Granulocytes %, Automated 0.0      Lymphocytes % 20.0      Monocytes % 9.5      Eosinophils % 1.2      Basophils % 1.2      Neutrophils Absolute 2.80      Immature Granulocytes Absolute, Automated 0.00      Lymphocytes Absolute 0.82      Monocytes Absolute 0.39       Eosinophils Absolute 0.05      Basophils Absolute 0.05     COMPREHENSIVE METABOLIC PANEL - Abnormal    Glucose 113 (*)     Sodium 130 (*)     Potassium 3.1 (*)     Chloride 93 (*)     Bicarbonate 33 (*)     Anion Gap 7 (*)     Urea Nitrogen 17      Creatinine 0.63      eGFR 85      Calcium 9.0      Albumin 3.3 (*)     Alkaline Phosphatase 74      Total Protein 6.4      AST 28      Bilirubin, Total 2.6 (*)     ALT 13     URINALYSIS WITH REFLEX CULTURE AND MICROSCOPIC - Abnormal    Color, Urine Yellow      Appearance, Urine Turbid (*)     Specific Gravity, Urine 1.018      pH, Urine 6.0      Protein, Urine NEGATIVE      Glucose, Urine Normal      Blood, Urine 0.1 (1+) (*)     Ketones, Urine NEGATIVE      Bilirubin, Urine NEGATIVE      Urobilinogen, Urine 8 (3+) (*)     Nitrite, Urine NEGATIVE      Leukocyte Esterase, Urine NEGATIVE     URINALYSIS MICROSCOPIC WITH REFLEX CULTURE - Abnormal    WBC, Urine 1-5      RBC, Urine >20 (*)     Squamous Epithelial Cells, Urine 1-9 (SPARSE)      Bacteria, Urine 1+ (*)     Mucus, Urine 2+      Hyaline Casts, Urine OCCASIONAL (*)     Calcium Oxalate Crystals, Urine 2+ (*)    MAGNESIUM - Normal    Magnesium 1.79     URINALYSIS WITH REFLEX CULTURE AND MICROSCOPIC    Narrative:     The following orders were created for panel order Urinalysis with Reflex Culture and Microscopic.  Procedure                               Abnormality         Status                     ---------                               -----------         ------                     Urinalysis with Reflex C...[[id number]]  Abnormal            Final result               Extra Urine Gray Tube[[id number]]                            In process                   Please view results for these tests on the individual orders.   EXTRA URINE GRAY TUBE      CT head wo IV contrast   Final Result   NO ACUTE INTRACRANIAL PROCESS        MACRO:   None        Signed by: Rj Tejeda 8/7/2025 1:31 PM   Dictation workstation:    [id number]      XR chest 1 view   Final Result   1. Mild enlargement of the cardiac silhouette without acute   cardiopulmonary process.                  MACRO:   None        Signed by: Jabier Whitfield 8/7/2025 12:20 PM   Dictation workstation:   [id number]                   Procedure  Procedures    Medications   sodium chloride 0.9 % bolus 1,000 mL (0 mL intravenous Stopped 8/7/25 1438)        ED Course as of 08/07/25 1540   Thu Aug 07, 2025   1304 Workup returns showing a hypokalemia as well as a hyponatremia and hypochloremia..  Chest x-ray shows no acute process.  Awaiting urine and head CT. [EF]   1539 Appears patient is having coordination difficulty, he is dehydrated IV fluids administered potassium was replaced however given persistent symptoms, ongoing for 2 days I feel he should be admitted for further management evaluation.  Hyponatremia is noted as well 130 but baseline of 135.  Does not seem to be significant enough.  CT of the head unremarkable will admit the patient however stroke workup and evaluation for coordination difficulty.  He does follow commands poorly so finger-nose testing is not complete. [ML]      ED Course User Index  [EF] Anat Ochoa DO  [ML] Marty R Lejeune, DO         Diagnoses as of 08/07/25 1540   Coordination impairment   Generalized weakness        1. Coordination impairment        2. Generalized weakness             DISPOSITION: Admit to hospital.  All imaging and laboratory results were discussed with the patient in detail including acute as well as incidental findings.  I discussed the differential, results and treatment plan with the patient and/or family/friend/caregiver if present.  I discussed the reason and need for admission to the hospital as well as risk and benefits.  Patient/family/caregiver/friend is in agreement with transfer as well as choice of facility to be transferred to.  Education and reassurance provided regards to presumed diagnosis was given.   Patient/family/caregiver understand and all questions answered.      Marty LeJeune, DO  Internal & Emergency Medicine  2:41 PM   08/07/25        Marty R Lejeune, DO  Resident  08/07/25 2266

## 2025-08-07 NOTE — H&P
Chief complaint: Right-sided weakness    History Of Present Illness  Kolton Acosta is a 89 y.o. male with a past medical history of aortic valve stenosis status post TAVR, chronic systolic heart failure, coronary artery disease, hypertension, hyperlipidemia, paroxysmal atrial fibrillation, on Eliquis, who presented to the emergency department with complaints of weakness.  Patient was living independently up until about a week ago.  He recently moved into an assisted living facility.  Typically is able to walk with a walker and feed himself.  Yesterday he was noted to have increased weakness and difficulty feeding himself.  His symptoms improved however today niece states noted to have right sided weakness and increased difficulty eating.  On exam patient noted to have right upper and lower extremity weakness in addition to difficulty with coordination.  Patient also reporting numbness to bilateral upper extremities.  Denies shortness of breath, chest pain, nausea, vomiting, or abdominal pain.  Patient does have some intermittent confusion, forgetfulness.  Niece at bedside and able to assist with history.  Also spoke with his daughter Tremayne over the phone.    In the ED: Glucose 113, sodium 130, potassium 3.1, BUN/creatinine 17/0.63, magnesium 1.79, WBC 4.1, H&H 14.7/43.1, platelets 158.  UA was negative.  CT of the head showed no acute intracranial process.  Chest x-ray showed mild enlargement of the cardiac silhouette without acute cardiopulmonary process.  Patient was given aspirin, statin, IV fluids, and potassium replacement in the ED.  Admitted to [facility] for further evaluation and treatment.     Past Medical History  Medical History[1]    Surgical History  Surgical History[2]     Social History  He reports that he has never smoked. He has never been exposed to tobacco smoke. He has never used smokeless tobacco. He reports that he does not drink alcohol and does not use drugs.    Family History  Family  History[3]     Allergies  Patient has no known allergies.    Review of Systems   Constitutional:  Negative for appetite change, fatigue and fever.   HENT:  Negative for congestion and rhinorrhea.    Respiratory:  Negative for cough, chest tightness and shortness of breath.    Cardiovascular:  Negative for chest pain and palpitations.   Gastrointestinal:  Negative for abdominal distention, abdominal pain, constipation, diarrhea, nausea and vomiting.   Genitourinary:  Negative for dysuria and urgency.   Neurological:  Positive for weakness and numbness. Negative for dizziness and light-headedness.   All other systems reviewed and are negative.       Physical Exam  Vitals reviewed.   Constitutional:       Comments: Elderly.  Limited historian.   HENT:      Head: Normocephalic and atraumatic.      Comments: Hard of hearing     Nose: Nose normal.      Mouth/Throat:      Mouth: Mucous membranes are moist.     Eyes:      Extraocular Movements: Extraocular movements intact.      Conjunctiva/sclera: Conjunctivae normal.       Cardiovascular:      Rate and Rhythm: Normal rate and regular rhythm.   Pulmonary:      Effort: Pulmonary effort is normal.      Breath sounds: Normal breath sounds. No wheezing, rhonchi or rales.   Abdominal:      General: Bowel sounds are normal.      Palpations: Abdomen is soft.      Tenderness: There is no abdominal tenderness.     Musculoskeletal:         General: Normal range of motion.      Cervical back: Normal range of motion and neck supple.     Skin:     General: Skin is warm and dry.      Capillary Refill: Capillary refill takes less than 2 seconds.      Comments: Scattered ecchymotic areas to bilateral upper extremities, noted healing hematoma to left lower back     Neurological:      General: No focal deficit present.      Mental Status: He is alert and oriented to person, place, and time.      Comments: Intermittent forgetfulness   Psychiatric:         Mood and Affect: Mood normal.         " Behavior: Behavior normal.          Last Recorded Vitals  Blood pressure 146/71, pulse 76, temperature 36.3 °C (97.4 °F), temperature source Oral, resp. rate 13, height 1.803 m (5' 11\"), weight 60.8 kg (134 lb), SpO2 99%.    Relevant Results  Lab Results   Component Value Date    GLUCOSE 113 (H) 08/07/2025    CALCIUM 9.0 08/07/2025     (L) 08/07/2025    K 3.1 (L) 08/07/2025    CO2 33 (H) 08/07/2025    CL 93 (L) 08/07/2025    BUN 17 08/07/2025    CREATININE 0.63 08/07/2025      Lab Results   Component Value Date    WBC 4.1 (L) 08/07/2025    HGB 14.7 08/07/2025    HCT 43.1 08/07/2025    MCV 93 08/07/2025     08/07/2025    CT head wo IV contrast  Result Date: 8/7/2025  NO ACUTE INTRACRANIAL PROCESS   MACRO: None   Signed by: Rj Tejeda 8/7/2025 1:31 PM Dictation workstation:   [id number]    XR chest 1 view  Result Date: 8/7/2025  1. Mild enlargement of the cardiac silhouette without acute cardiopulmonary process.       MACRO: None   Signed by: Jabier Whitfield 8/7/2025 12:20 PM Dictation workstation:   [id number]     CBC, BMP, CT head, x-ray results reviewed.  Vital signs reviewed  Assessment & Plan  Weakness    Right-sided weakness  Rule out TIA/CVA  Noted right upper and lower extremity weakness and poor coordination  Monitor on telemetry  ASA/statin, hold Eliquis for now  permissive hypertension  lipid panel in AM  MRI/MRA  US carotid  echocardiogram  neurostroke assessment  consult neurology, appreciate recommendations     Hypertension  Not on any antihypertensives  Monitor blood pressure  Permissive hypertension    Hyperlipidemia  Statin    Glaucoma  Resume home eyedrops    History of aortic valve stenosis status post TAVR  On Eliquis, will resume pending MRI results  12 with cardiology outpatient    Plan  Monitor on telemetry  Stroke workup  PT/OT  DVT prophylaxis  Fall precautions  CBC and BMP in the morning    CODE STATUS: Discussed with the patient's daughter and niece, patient is a full " code            Aure Mayers, APRN-CNP         [1]   Past Medical History:  Diagnosis Date    A-fib (Multi)     Anxiety     Aortic stenosis 09/10/2024    Cataract     Glaucoma     HL (hearing loss)    [2]   Past Surgical History:  Procedure Laterality Date    ANOMALOUS PULMONARY VENOUS RETURN REPAIR, TOTAL      AORTIC VALVE REPLACEMENT  09/10/2024    CARDIAC CATHETERIZATION      CARDIAC VALVE REPLACEMENT      WISDOM TOOTH EXTRACTION  01/2024    12 teeth removed   [3]   Family History  Problem Relation Name Age of Onset    Cancer Mother Jeanette     Colon cancer Mother Jeanette     Heart attack Father

## 2025-08-07 NOTE — ASSESSMENT & PLAN NOTE
Right-sided weakness  Rule out TIA/CVA  Noted right upper and lower extremity weakness and poor coordination  Monitor on telemetry  ASA/statin, hold Eliquis for now  permissive hypertension  lipid panel in AM  MRI/MRA  US carotid  echocardiogram  neurostroke assessment  consult neurology, appreciate recommendations     Hypertension  Not on any antihypertensives  Monitor blood pressure  Permissive hypertension    Hyperlipidemia  Statin    Glaucoma  Resume home eyedrops    History of aortic valve stenosis status post TAVR  On Eliquis, will resume pending MRI results  12 with cardiology outpatient    Plan  Monitor on telemetry  Stroke workup  PT/OT  DVT prophylaxis  Fall precautions  CBC and BMP in the morning    CODE STATUS: Discussed with the patient's daughter and niece, patient is a full code

## 2025-08-07 NOTE — PROGRESS NOTES
Pharmacy Medication History Review    Kolton Acosta is a 89 y.o. male. Pharmacy reviewed the patient's [id number] medications and allergies for accuracy.    Medications ADDED:  none  Medications CHANGED:  Eliquis 5mg - 5mg BID  Medications REMOVED:   None      The list below reflects the updated PTA list. Comments regarding how patient may be taking medications differently can be found in the Admit Orders Activity  Prior to Admission Medications   Prescriptions Last Dose Informant   apixaban (Eliquis) 5 mg tablet Unknown self   Sig: Take 0.5 tablets (2.5 mg) by mouth 2 times a day.   Patient taking differently: Take 1 tablet (5 mg) by mouth 2 times a day.   cholecalciferol (Vitamin D-3) 50 MCG (2000 UT) tablet Unknown self   Sig: Take 1 tablet (2,000 Units) by mouth once daily.   cyanocobalamin (Vitamin B-12) 1,000 mcg tablet Unknown self   Sig: Take 1 tablet (1,000 mcg) by mouth once daily.      Facility-Administered Medications: None        The list below reflects the updated allergy list. Please review each documented allergy for additional clarification and justification.  Allergies  Reviewed by Aixa Kidd CPhT on 8/7/2025   No Known Allergies         Pharmacy has been updated to [facility].    Sources used to complete the med history include facility medication list - [facility] [phone]    Below are additional concerns with the patient's PTA list.  None. This facility does not provide last doses given on the paperwork they provide to our facility     Aixa Kidd CPhT-Adv  Please reach out via [employer] Secure Chat for questions

## 2025-08-07 NOTE — ED TRIAGE NOTES
Has been having generalized weakness with trouble standing within the last week, [facility] is [facility],

## 2025-08-07 NOTE — ED PROVIDER NOTES
EMERGENCY DEPARTMENT ENCOUNTER      Pt Name: Kolton Acosta  MRN: [id number]  Birthdate 8/3/1926  Date of evaluation: 8/7/2025  ED Provider: Anat Ochoa DO     CHIEF COMPLAINT       Chief Complaint   Patient presents with    Weakness, Gen       HISTORY OF PRESENT ILLNESS    Kolton Acosta is a 89 y.o. who presents to the emergency department via EMS with complaint of weakness.  His niece, present at the bedside, states that he recently transitioned from living on his own to an assisted living facility on July 31.  He ambulates with a rollator.  He normally was ambulatory with a cane and independent.  Today she noted that he appeared to be having difficulty using the fork and could not figure out how to hold the juice to prevent it from spelling and use the straw.  This is atypical for him.  He had no actual leg or arm weakness, no speech difficulties or facial droop.  Patient was taken to breakfast sitting in his rollator as a nursing states he was not able to get up and walk which is also highly atypical.    REVIEW OF SYSTEMS     Focused ROS performed and negative other than as listed in HPI    PAST MEDICAL HISTORY   Medical History[1]    SURGICAL HISTORY     Surgical History[2]    CURRENT MEDICATIONS       Previous Medications    APIXABAN (ELIQUIS) 5 MG TABLET    Take 0.5 tablets (2.5 mg) by mouth 2 times a day.    CHOLECALCIFEROL (VITAMIN D-3) 50 MCG (2000 UT) TABLET    Take 1 tablet (2,000 Units) by mouth once daily.    CYANOCOBALAMIN (VITAMIN B-12) 1,000 MCG TABLET    Take 1 tablet (1,000 mcg) by mouth once daily.       ALLERGIES     Patient has no known allergies.    FAMILY HISTORY     Family History[3]     SOCIAL HISTORY     Social History[4]    PHYSICAL EXAM       ED Triage Vitals [08/07/25 1141]   Temperature Heart Rate Respirations BP   36.3 °C (97.4 °F) 76 13 146/71      Pulse Ox Temp Source Heart Rate Source Patient Position   99 % Oral -- --      BP Location FiO2 (%)     -- --        General: Appears as  stated age but in no acute distress, alert  Head: Head atraumatic; normocephalic  Eyes: normal inspection; no icterus  ENT: mucosa moist without lesion  Neck: Normal inspection, no meningeal signs  Resp: Normal breath sounds, no wheeze or crackles; No respiratory distress  Chest Wall: no tenderness or deformity  Heart: Heart rate and rhythm regular; No Murmurs  Abdomen: Soft, Non-tender; No distention, guarding, rigidity, or rebound  MSK: Normal appearance; Moves all extremities; No Pedal edema  Neuro: Alert; no focal deficits, moves all extremities  Psych: Mood and Affect normal  Skin: Color appropriate; warm; Dry    DIAGNOSTIC RESULTS   Lab and radiology results are independently interpreted unless noted below.  RADIOLOGY (Per Emergency Physician):     Interpretation per the Radiologist below, if available at the time of this note:  XR chest 1 view   Final Result   1. Mild enlargement of the cardiac silhouette without acute   cardiopulmonary process.                  MACRO:   None        Signed by: Jabier Whitfield 8/7/2025 12:20 PM   Dictation workstation:   [id number]      CT head wo IV contrast    (Results Pending)       LABS:  Abnormal Labs Reviewed   CBC WITH AUTO DIFFERENTIAL - Abnormal; Notable for the following components:       Result Value    WBC 4.1 (*)     All other components within normal limits   COMPREHENSIVE METABOLIC PANEL - Abnormal; Notable for the following components:    Glucose 113 (*)     Sodium 130 (*)     Potassium 3.1 (*)     Chloride 93 (*)     Bicarbonate 33 (*)     Anion Gap 7 (*)     Albumin 3.3 (*)     Bilirubin, Total 2.6 (*)     All other components within normal limits       All other labs were within normal range or not returned as of this dictation.    EMERGENCY DEPARTMENT COURSE/MDM   Patient presents with sudden onset of weakness.  Symptoms appear to be nonfocal.  I do appreciate the video that taken by his niece showing difficulties coordinating his fork as well as how to  use a straw.  Dalila notes that this has been ongoing for the past 2 days.  Uncertain if this is a potential CVA versus acute encephalopathy from underlying abnormality such as urinary tract infection.  Workup is initiated.      ED Course as of 08/08/25 0706   Thu Aug 07, 2025   1304 Workup returns showing a hypokalemia as well as a hyponatremia and hypochloremia..  Chest x-ray shows no acute process.  Awaiting urine and head CT. [EF]   1539 Appears patient is having coordination difficulty, he is dehydrated IV fluids administered potassium was replaced however given persistent symptoms, ongoing for 2 days I feel he should be admitted for further management evaluation.  Hyponatremia is noted as well 130 but baseline of 135.  Does not seem to be significant enough.  CT of the head unremarkable will admit the patient however stroke workup and evaluation for coordination difficulty.  He does follow commands poorly so finger-nose testing is not complete. [ML]      ED Course User Index  [EF] Anat Ochoa, DO  [ML] Marty R Lejeune,          Diagnoses as of 08/08/25 0706   Coordination impairment   Generalized weakness       Meds Administered:  Medications   sodium chloride 0.9 % bolus 1,000 mL (1,000 mL intravenous New Bag 8/7/25 1148)       PROCEDURES   Unless otherwise noted below, none  Procedures      FINAL IMPRESSION    No diagnosis found.      DISPOSITION       Patient was signed out to Dr Lejeune at 1430 pending completion of their work-up.  Please see the next provider's transition of care note for the remainder of the patient's care.     Critical Care time: Not Indicated    (Comment: Please note this report has been produced using speech recognition software and may contain errors related to that system including errors in grammar, punctuation, and spelling, as well as words and phrases that may be inappropriate.  If there are any questions or concerns please feel free to contact the dictating provider for  clarification.)    Anat Ochoa DO, MPH (electronically signed)  Emergency Medicine Physician    History provided by: Patient, Family Member, and EMS  Limitations to History: None  External Records Reviewed with Brief Summary: None  Social Determinants of Health which Significantly Impact Care: recently moved to [facility]   EKG Independent Interpretation: EKG not obtained  Independent Result Review and Interpretation: Relevant laboratory and radiographic results were reviewed and independently interpreted by myself.  As necessary, they are commented on in the ED Course.  Chronic conditions affecting the patient's care: As documented above in MDM  The patient was discussed with the following consultants/services: None  Care Considerations: As documented above in MDM                 [1]   Past Medical History:  Diagnosis Date    A-fib (Multi)     Anxiety     Aortic stenosis 09/10/2024    Cataract     Glaucoma     HL (hearing loss)    [2]   Past Surgical History:  Procedure Laterality Date    ANOMALOUS PULMONARY VENOUS RETURN REPAIR, TOTAL      AORTIC VALVE REPLACEMENT  09/10/2024    CARDIAC CATHETERIZATION      CARDIAC VALVE REPLACEMENT      WISDOM TOOTH EXTRACTION  01/2024    12 teeth removed   [3]   Family History  Problem Relation Name Age of Onset    Cancer Mother Jeanette     Colon cancer Mother Jeanette     Heart attack Father     [4]   Social History  Socioeconomic History    Marital status:    Tobacco Use    Smoking status: Never     Passive exposure: Never    Smokeless tobacco: Never   Vaping Use    Vaping status: Never Used   Substance and Sexual Activity    Alcohol use: Never    Drug use: Never    Sexual activity: Not Currently     Partners: Female        Anat Ochoa DO  08/08/25 0707

## 2025-08-08 ENCOUNTER — APPOINTMENT (OUTPATIENT)
Dept: RADIOLOGY | Facility: HOSPITAL | Age: OVER 89
End: 2025-08-08
Payer: MEDICARE

## 2025-08-08 ENCOUNTER — TELEPHONE (OUTPATIENT)
Dept: PRIMARY CARE | Facility: CLINIC | Age: OVER 89
End: 2025-08-08

## 2025-08-08 ENCOUNTER — APPOINTMENT (OUTPATIENT)
Dept: CARDIOLOGY | Facility: HOSPITAL | Age: OVER 89
End: 2025-08-08
Payer: MEDICARE

## 2025-08-08 ENCOUNTER — PHARMACY VISIT (OUTPATIENT)
Dept: PHARMACY | Facility: CLINIC | Age: OVER 89
End: 2025-08-08
Payer: MEDICARE

## 2025-08-08 LAB
ANION GAP SERPL CALCULATED.3IONS-SCNC: 9 MMOL/L (ref 10–20)
AORTIC VALVE PEAK VELOCITY: 1.59 M/S
AV PEAK GRADIENT: 10 MMHG
BASOPHILS # BLD AUTO: 0.05 X10*3/UL (ref 0–0.1)
BASOPHILS NFR BLD AUTO: 0.9 %
BUN SERPL-MCNC: 17 MG/DL (ref 6–23)
CALCIUM SERPL-MCNC: 8.6 MG/DL (ref 8.6–10.3)
CHLORIDE SERPL-SCNC: 96 MMOL/L (ref 98–107)
CHOLEST SERPL-MCNC: 147 MG/DL (ref 0–199)
CHOLEST/HDLC SERPL: 3.4 {RATIO}
CO2 SERPL-SCNC: 30 MMOL/L (ref 21–32)
CREAT SERPL-MCNC: 0.54 MG/DL (ref 0.5–1.3)
EGFRCR SERPLBLD CKD-EPI 2021: 90 ML/MIN/1.73M*2
EJECTION FRACTION: 63 %
EOSINOPHIL # BLD AUTO: 0.09 X10*3/UL (ref 0–0.4)
EOSINOPHIL NFR BLD AUTO: 1.6 %
ERYTHROCYTE [DISTWIDTH] IN BLOOD BY AUTOMATED COUNT: 14.2 % (ref 11.5–14.5)
EST. AVERAGE GLUCOSE BLD GHB EST-MCNC: 103 MG/DL
GLUCOSE BLD MANUAL STRIP-MCNC: 106 MG/DL (ref 74–99)
GLUCOSE BLD MANUAL STRIP-MCNC: 71 MG/DL (ref 74–99)
GLUCOSE BLD MANUAL STRIP-MCNC: 81 MG/DL (ref 74–99)
GLUCOSE SERPL-MCNC: 82 MG/DL (ref 74–99)
HBA1C MFR BLD: 5.2 % (ref ?–5.7)
HCT VFR BLD AUTO: 40.7 % (ref 41–52)
HDLC SERPL-MCNC: 43 MG/DL
HGB BLD-MCNC: 14.2 G/DL (ref 13.5–17.5)
IMM GRANULOCYTES # BLD AUTO: 0.02 X10*3/UL (ref 0–0.5)
IMM GRANULOCYTES NFR BLD AUTO: 0.3 % (ref 0–0.9)
LDLC SERPL CALC-MCNC: 89 MG/DL
LEFT VENTRICLE INTERNAL DIMENSION DIASTOLE: 4.07 CM (ref 3.5–6)
LYMPHOCYTES # BLD AUTO: 1.17 X10*3/UL (ref 0.8–3)
LYMPHOCYTES NFR BLD AUTO: 20.3 %
MCH RBC QN AUTO: 31.3 PG (ref 26–34)
MCHC RBC AUTO-ENTMCNC: 34.9 G/DL (ref 32–36)
MCV RBC AUTO: 90 FL (ref 80–100)
MITRAL VALVE E/A RATIO: 2.53
MONOCYTES # BLD AUTO: 0.69 X10*3/UL (ref 0.05–0.8)
MONOCYTES NFR BLD AUTO: 12 %
NEUTROPHILS # BLD AUTO: 3.75 X10*3/UL (ref 1.6–5.5)
NEUTROPHILS NFR BLD AUTO: 64.9 %
NON HDL CHOLESTEROL: 104 MG/DL (ref 0–149)
NRBC BLD-RTO: 0 /100 WBCS (ref 0–0)
PLATELET # BLD AUTO: 154 X10*3/UL (ref 150–450)
POTASSIUM SERPL-SCNC: 3.7 MMOL/L (ref 3.5–5.3)
RBC # BLD AUTO: 4.53 X10*6/UL (ref 4.5–5.9)
RIGHT VENTRICLE FREE WALL PEAK S': 6.72 CM/S
SODIUM SERPL-SCNC: 131 MMOL/L (ref 136–145)
TRICUSPID ANNULAR PLANE SYSTOLIC EXCURSION: 0.8 CM
TRIGL SERPL-MCNC: 74 MG/DL (ref 0–149)
VLDL: 15 MG/DL (ref 0–40)
WBC # BLD AUTO: 5.8 X10*3/UL (ref 4.4–11.3)

## 2025-08-08 PROCEDURE — 93306 TTE W/DOPPLER COMPLETE: CPT | Performed by: INTERNAL MEDICINE

## 2025-08-08 PROCEDURE — 99232 SBSQ HOSP IP/OBS MODERATE 35: CPT | Performed by: NURSE PRACTITIONER

## 2025-08-08 PROCEDURE — 80048 BASIC METABOLIC PNL TOTAL CA: CPT | Performed by: NURSE PRACTITIONER

## 2025-08-08 PROCEDURE — 82947 ASSAY GLUCOSE BLOOD QUANT: CPT

## 2025-08-08 PROCEDURE — 70450 CT HEAD/BRAIN W/O DYE: CPT

## 2025-08-08 PROCEDURE — 97162 PT EVAL MOD COMPLEX 30 MIN: CPT | Mod: GP

## 2025-08-08 PROCEDURE — 85025 COMPLETE CBC W/AUTO DIFF WBC: CPT | Performed by: NURSE PRACTITIONER

## 2025-08-08 PROCEDURE — 93306 TTE W/DOPPLER COMPLETE: CPT

## 2025-08-08 PROCEDURE — 93880 EXTRACRANIAL BILAT STUDY: CPT | Performed by: INTERNAL MEDICINE

## 2025-08-08 PROCEDURE — 2500000001 HC RX 250 WO HCPCS SELF ADMINISTERED DRUGS (ALT 637 FOR MEDICARE OP): Performed by: STUDENT IN AN ORGANIZED HEALTH CARE EDUCATION/TRAINING PROGRAM

## 2025-08-08 PROCEDURE — 36415 COLL VENOUS BLD VENIPUNCTURE: CPT | Performed by: NURSE PRACTITIONER

## 2025-08-08 PROCEDURE — RXMED WILLOW AMBULATORY MEDICATION CHARGE

## 2025-08-08 PROCEDURE — 97166 OT EVAL MOD COMPLEX 45 MIN: CPT | Mod: GO

## 2025-08-08 PROCEDURE — 93880 EXTRACRANIAL BILAT STUDY: CPT

## 2025-08-08 PROCEDURE — 99222 1ST HOSP IP/OBS MODERATE 55: CPT | Performed by: STUDENT IN AN ORGANIZED HEALTH CARE EDUCATION/TRAINING PROGRAM

## 2025-08-08 PROCEDURE — 83036 HEMOGLOBIN GLYCOSYLATED A1C: CPT | Mod: WESLAB | Performed by: NURSE PRACTITIONER

## 2025-08-08 PROCEDURE — 70450 CT HEAD/BRAIN W/O DYE: CPT | Performed by: STUDENT IN AN ORGANIZED HEALTH CARE EDUCATION/TRAINING PROGRAM

## 2025-08-08 PROCEDURE — 80061 LIPID PANEL: CPT | Performed by: NURSE PRACTITIONER

## 2025-08-08 PROCEDURE — G0378 HOSPITAL OBSERVATION PER HR: HCPCS

## 2025-08-08 PROCEDURE — 2500000001 HC RX 250 WO HCPCS SELF ADMINISTERED DRUGS (ALT 637 FOR MEDICARE OP): Performed by: NURSE PRACTITIONER

## 2025-08-08 RX ORDER — ACETAMINOPHEN 160 MG/5ML
650 SOLUTION ORAL EVERY 4 HOURS PRN
Start: 2025-08-08

## 2025-08-08 RX ORDER — ASPIRIN 81 MG/1
81 TABLET ORAL DAILY
Qty: 30 TABLET | Refills: 2 | Status: CANCELLED | OUTPATIENT
Start: 2025-08-09

## 2025-08-08 RX ORDER — TIMOLOL MALEATE 2.5 MG/ML
1 SOLUTION/ DROPS OPHTHALMIC 2 TIMES DAILY
Start: 2025-08-08

## 2025-08-08 RX ORDER — ATORVASTATIN CALCIUM 40 MG/1
40 TABLET, FILM COATED ORAL NIGHTLY
Qty: 30 TABLET | Refills: 2 | Status: SHIPPED | OUTPATIENT
Start: 2025-08-08

## 2025-08-08 RX ADMIN — APIXABAN 2.5 MG: 2.5 TABLET, FILM COATED ORAL at 09:36

## 2025-08-08 RX ADMIN — TIMOLOL MALEATE 1 DROP: 2.5 SOLUTION/ DROPS OPHTHALMIC at 09:36

## 2025-08-08 RX ADMIN — ATORVASTATIN CALCIUM 40 MG: 40 TABLET, FILM COATED ORAL at 21:44

## 2025-08-08 RX ADMIN — ASPIRIN 81 MG: 81 TABLET, CHEWABLE ORAL at 09:36

## 2025-08-08 RX ADMIN — TIMOLOL MALEATE 1 DROP: 2.5 SOLUTION/ DROPS OPHTHALMIC at 21:44

## 2025-08-08 RX ADMIN — APIXABAN 2.5 MG: 2.5 TABLET, FILM COATED ORAL at 21:44

## 2025-08-08 ASSESSMENT — PAIN SCALES - GENERAL
PAINLEVEL_OUTOF10: 0 - NO PAIN

## 2025-08-08 ASSESSMENT — COGNITIVE AND FUNCTIONAL STATUS - GENERAL
TOILETING: A LOT
MOVING FROM LYING ON BACK TO SITTING ON SIDE OF FLAT BED WITH BEDRAILS: A LITTLE
TOILETING: TOTAL
MOVING TO AND FROM BED TO CHAIR: A LOT
EATING MEALS: A LOT
WALKING IN HOSPITAL ROOM: A LOT
DRESSING REGULAR LOWER BODY CLOTHING: A LOT
MOBILITY SCORE: 12
DRESSING REGULAR LOWER BODY CLOTHING: TOTAL
HELP NEEDED FOR BATHING: A LOT
DRESSING REGULAR UPPER BODY CLOTHING: A LITTLE
MOVING TO AND FROM BED TO CHAIR: A LOT
PERSONAL GROOMING: A LOT
TURNING FROM BACK TO SIDE WHILE IN FLAT BAD: A LOT
MOVING FROM LYING ON BACK TO SITTING ON SIDE OF FLAT BED WITH BEDRAILS: A LITTLE
PERSONAL GROOMING: A LOT
HELP NEEDED FOR BATHING: A LOT
TURNING FROM BACK TO SIDE WHILE IN FLAT BAD: A LOT
CLIMB 3 TO 5 STEPS WITH RAILING: TOTAL
STANDING UP FROM CHAIR USING ARMS: A LOT
CLIMB 3 TO 5 STEPS WITH RAILING: TOTAL
EATING MEALS: A LOT
MOBILITY SCORE: 12
STANDING UP FROM CHAIR USING ARMS: A LOT
DAILY ACTIVITIY SCORE: 13
DAILY ACTIVITIY SCORE: 10
DRESSING REGULAR UPPER BODY CLOTHING: A LOT
WALKING IN HOSPITAL ROOM: A LOT

## 2025-08-08 ASSESSMENT — ACTIVITIES OF DAILY LIVING (ADL)
LACK_OF_TRANSPORTATION: NO
ADL_ASSISTANCE: NEEDS ASSISTANCE
BATHING_ASSISTANCE: MAXIMAL
ADL_ASSISTANCE: NEEDS ASSISTANCE

## 2025-08-08 ASSESSMENT — PAIN - FUNCTIONAL ASSESSMENT
PAIN_FUNCTIONAL_ASSESSMENT: 0-10
PAIN_FUNCTIONAL_ASSESSMENT: FLACC (FACE, LEGS, ACTIVITY, CRY, CONSOLABILITY)
PAIN_FUNCTIONAL_ASSESSMENT: FLACC (FACE, LEGS, ACTIVITY, CRY, CONSOLABILITY)

## 2025-08-08 NOTE — ASSESSMENT & PLAN NOTE
Right-sided weakness  Rule out TIA/CVA  Noted right upper and lower extremity weakness and poor coordination  Monitor on telemetry  ASA/statin, hold Eliquis for now  permissive hypertension  lipid panel in AM  MRI/MRA showed possible acute vs early subacute infarct in the right parietal lobe  US carotid with <50% stenosis B/L  Echocardiogram showed normal EF  neurostroke assessment  consult neurology, appreciate recommendations     Hypertension  Not on any antihypertensives  Monitor blood pressure  Permissive hypertension    Hyperlipidemia  Statin    Glaucoma  Resume home eyedrops    History of aortic valve stenosis status post TAVR  On Eliquis, will resume pending MRI results    Plan  Monitor on telemetry  Stroke workup  PT/OT  DVT prophylaxis  Fall precautions  Possible discharge tonight pending neuro to see    CODE STATUS: Discussed with the patient's daughter and niece, patient is a full code

## 2025-08-08 NOTE — PROGRESS NOTES
Sandra Eisenberg is a 89 y.o. male on day 0 of admission presenting with Weakness.      Subjective   Patient seen and examined. Awake/alert/oriented, forgetful. Denies chest pain, shortness of breath, fevers, chills, nausea, or vomiting. Denies lightheadedness or dizziness. Still having right upper and lower extremity weakness however somewhat improved from yesterday.        Objective     Last Recorded Vitals  /76 (BP Location: Left arm, Patient Position: Lying)   Pulse 69   Temp 36.2 °C (97.2 °F) (Oral)   Resp 18   Wt 60.8 kg (134 lb)   SpO2 100%   Intake/Output last 3 Shifts:    Intake/Output Summary (Last 24 hours) at 8/8/2025 1245  Last data filed at 8/8/2025 1215  Gross per 24 hour   Intake 170 ml   Output --   Net 170 ml       Admission Weight  Weight: 60.8 kg (134 lb) (08/07/25 1141)    Daily Weight  08/07/25 : 60.8 kg (134 lb)    Image Results  Carotid duplex [place] [address]             Phone [phone]       Vascular Lab Report     [facility] US CAROTID ARTERY DUPLEX BILATERAL    Patient Name:      SANDRA EISENBERG        Reading Physician: [id number] Sho Benitez MD  Study Date:        8/8/2025            Ordering Provider: [id number] OLIVIER HOYOS  MRN/PID:           [id number]            Fellow:  Accession#:        [id number]        Technologist:      Soraya Crooks RVT  Date of Birth/Age: 8/3/1926 / 89 years Technologist 2:  Gender:            M                   Encounter#:        [id number]  Admission Status:  Outpatient          Location           [facility]                                         Performed:       Diagnosis/ICD: Other specified symptoms and signs involving the circulatory and                 respiratory systems-R09.89  CPT Codes:     84558 Cerebrovascular Carotid Duplex scan complete        CONCLUSIONS:  Right Carotid: Findings are consistent with less than 50% stenosis of the right proximal internal carotid artery. Right external carotid artery appears patent with no evidence of stenosis. The right vertebral artery is patent with antegrade flow. No evidence of hemodynamically significant stenosis in the right subclavian artery.  Left Carotid: Findings are consistent with less than 50% stenosis of the left proximal internal carotid artery. Left external carotid artery appears patent with no evidence of stenosis. The left vertebral artery is patent with antegrade flow. No evidence of hemodynamically significant stenosis in the left subclavian artery.     Imaging & Doppler Findings:  Right Plaque Morph: The proximal right internal carotid artery demonstrates heterogenous plaque. The proximal right external carotid artery demonstrates heterogenous plaque. The distal right common carotid artery demonstrates heterogenous plaque.  Left Plaque Morph: The proximal left internal carotid artery demonstrates heterogenous and calcified plaque. The proximal left external carotid artery demonstrates heterogenous plaque. The mid left common carotid artery demonstrates heterogenous plaque. The distal left common carotid artery demonstrates heterogenous and calcified plaque.      Right                      Left    PSV     EDV                PSV     EDV  37 cm/s 0 cm/s    CCA P    39 cm/s 4 cm/s  39 cm/s 5 cm/s    CCA D    39 cm/s 7 cm/s  25 cm/s 4 cm/s    ICA P    26 cm/s 7 cm/s  50 cm/s 13 cm/s   ICA M    45 cm/s 9 cm/s  45 cm/s 11 cm/s   ICA D    41 cm/s 11 cm/s  45 cm/s            ECA     42 cm/s  22 cm/s 5 cm/s  Vertebral  27 cm/s 7 cm/s  57 cm/s         Subclavian 77 cm/s                     Right Left  ICA/CCA Ratio  0.6  0.7          [id number] Sho Benitez MD  Electronically signed by [id number] Sho Benitez MD on 8/8/2025 at 10:46:07 AM       ** Final **  Transthoracic Echo Complete             [facility] [place] [address]             Phone [phone]    TRANSTHORACIC ECHOCARDIOGRAM REPORT    Patient Name:       SANDRA EISENBERG         Lizzie Physician:    [id number] Tyrel Hudson MD  Study Date:         8/8/2025             Ordering Provider:    [id number] OLIVIER WAGNER HOYOS  MRN/PID:            [id number]             Fellow:  Accession#:         [id number]         Nurse:  Date of Birth/Age:  8/3/1926 / 89 years  Sonographer:          Mikel Delgadillo RDCS  Gender Assigned at                      Additional Staff:  Birth:  Height:             180.34 cm            Admit Date:  Weight:             60.78 kg             Admission Status:     Inpatient -                                                                 Routine  BSA / BMI:          1.78 m2 / 18.69      Department Location:  [facility]                      kg/m2  Blood Pressure: 129 /68 mmHg    Study Type:    TRANSTHORACIC ECHO (TTE) COMPLETE  Diagnosis/ICD: Transient cerebral ischemic attack, unspecified (NOT on                 LCD)-G45.9  Indication:    Transischemic Attack  CPT Codes:     Echo Complete w Full Doppler-[id number]    Patient History:  Pertinent History: A-Fib, HTN and Syncope.    Study Detail: The following Echo studies were performed: 2D, M-Mode, Doppler and                color flow. Technically challenging study due to body habitus and                patient lying in supine position.       PHYSICIAN INTERPRETATION:  Left Ventricle: Left ventricular ejection fraction is normal by visual estimate at 60-65%. There are no regional wall motion abnormalities. The left ventricular cavity size is normal. There is mild increased septal and normal posterior left ventricular wall thickness. There is left ventricular concentric remodeling. Spectral Doppler shows a Grade I (impaired relaxation pattern) of left  ventricular diastolic filling with normal left atrial filling pressure.  Left Atrium: The left atrial size is moderately dilated.  Right Ventricle: The right ventricle is mildly enlarged. There is normal right ventricular global systolic function.  Right Atrium: The right atrial size is mildly dilated.  Aortic Valve: The aortic valve was not well visualized. There is moderate to severe aortic valve cusp calcification. There is evidence of moderate to severe aortic valve stenosis.  There is no evidence of aortic valve regurgitation.  Mitral Valve: The mitral valve is mildly thickened. There is mild mitral annular calcification. There is trace mitral valve regurgitation. The E Vmax is 0.86 m/s.  Tricuspid Valve: The tricuspid valve is structurally normal. No evidence of tricuspid regurgitation.  Pulmonic Valve: The pulmonic valve is not well visualized. There is no indication of pulmonic valve regurgitation.  Pericardium: No pericardial effusion noted.  Aorta: The aortic root was not well visualized.  Systemic Veins: The inferior vena cava was not well visualized, IVC inspiratory collapse is not well visualized.       CONCLUSIONS:   1. Left ventricular ejection fraction is normal by visual estimate at 60-65%.   2. Spectral Doppler shows a Grade I (impaired relaxation pattern) of left ventricular diastolic filling with normal left atrial filling pressure.   3. There is normal right ventricular global systolic function.   4. Mildly enlarged right ventricle.   5. The left atrial size is moderately dilated.   6. There is moderate to severe aortic valve cusp calcification.    QUANTITATIVE DATA SUMMARY:     2D MEASUREMENTS:           Normal Ranges:  LAs:             5.09 cm   (2.7-4.0cm)  IVSd:            1.23 cm   (0.6-1.1cm)  LVPWd:           0.96 cm   (0.6-1.1cm)  LVIDd:           4.07 cm   (3.9-5.9cm)  LVIDs:           2.29 cm  LV Mass Index:   83.7 g/m2  LV % FS          43.7 %       LEFT ATRIUM:                Normal  Ranges:  LA Vol A4C:       90.5 ml   (22+/-6mL/m2)  LA Vol Index A4C: 50.9ml/m2  LA Vol A4C:       80.0 ml       RIGHT ATRIUM:          Normal Ranges:  RA Area A4C:  23.0 cm2       LV SYSTOLIC FUNCTION:                       Normal Ranges:  EF-Visual:      63 %  LV EF Reported: 63 %       LV DIASTOLIC FUNCTION:             Normal Ranges:  MV Peak E:             0.86 m/s    (0.7-1.2 m/s)  MV Peak A:             0.34 m/s    (0.42-0.7 m/s)  E/A Ratio:             2.53        (1.0-2.2)  MV lateral e'          0.06 m/s  MV A Dur:              254.04 msec       MITRAL VALVE:          Normal Ranges:  MV DT:        173 msec (150-240msec)       AORTIC VALVE:           Normal Ranges:  AoV Vmax:     1.59 m/s  (<=1.7m/s)  AoV Peak PG:  10.1 mmHg (<20mmHg)  LVOT Max Edgar: 0.93 m/s  (<=1.1m/s)  LVOT VTI:     14.93 cm       RIGHT VENTRICLE:  RV Basal 4.57 cm  RV Major 7.3 cm  TAPSE:   8.3 mm  RV s'    0.07 m/s       TRICUSPID VALVE/RVSP:        Normal Ranges:  Est. RA Pressure:     3 mmHg       [id number] Tyrel Hudson MD  Electronically signed on 8/8/2025 at 9:54:29 AM       ** Final **  MR brain wo IV contrast, MR angio neck wo IV contrast, MR angio head wo IV contrast  Narrative: Interpreted By:  Joseph Reyes,   STUDY:  MR BRAIN WO IV CONTRAST; MR ANGIO HEAD WO IV CONTRAST; MR ANGIO NECK  WO IV CONTRAST;  8/7/2025 10:53 pm; 8/7/2025 10:55 pm; 8/7/2025 10:56  pm      INDICATION:  Signs/Symptoms:TIA.      COMPARISON:  Same day noncontrast CT head      ACCESSION NUMBER(S):  [id number]; [id number]; [id number]      ORDERING CLINICIAN:  OLIVIER HOYOS      TECHNIQUE:  Axial T2, FLAIR, DWI, gradient echo T2 and  sagittal and coronal T1  weighted images of brain were acquired.      Time-of-flight MRA of the head  and neck was performed. The images  were reviewed as source images and maximum intensity projections.      FINDINGS:  Brain:      There is a small 2-3 mm focus of hyperintense DWI signal are present  in the right  occipital lobe (series 502, image 16) too small to  definitely characterized, although tiny focus of acute infarction is  not excluded.      No geographic area of diffusion restriction is identified.      There is no evidence of recent intracranial hemorrhage. No mass  effect or midline shift is present.      Moderate brain parenchymal volume loss is present without abnormal  ventricular dilatation. Basal cisterns are patent. No extra-axial  collections are present.      Low volume of FLAIR and T2 signal changes are present in the  periventricular and subcortical white matter of bilateral cerebral  hemispheres, likely representing sequela of microvascular disease.      No abnormal fluid signal is present in the paranasal sinuses or  mastoid air cells.      MRA of head:      MRA source images are significantly degraded by motion.      Within limitations of the study, there is symmetric flow enhancement  in the intracranial carotid arteries bilaterally without evidence of  occlusion, although areas of mild-to-moderate stenosis are not  excluded.      Visualized A1 and proximal A2 segments of the anterior cerebral  arteries do not demonstrate any evidence of occlusion, although more  distal arteries are poorly visualized due to motion.      M1 and M2 segments of the middle cerebral arteries do not demonstrate  any high-grade stenosis or occlusion bilaterally, although more  distal M3 segments are suboptimally evaluated.      Visualized vertebral arteries and basilar artery do not demonstrate  any evidence of occlusion.      Visualized proximal posterior cerebral arteries demonstrate symmetric  flow enhancement without evidence of occlusion.      MRA of neck:      The source images are significantly degraded by artifact.      Within limitations of the study, no occlusion is identified in the  common carotid artery, although mild stenosis is not excluded  distally.      Extracranial internal carotid arteries do not  demonstrate any  evidence of occlusion bilaterally, although mild stenosis is not  excluded distally.      Extracranial vertebral arteries are symmetric in appearance without  evidence of occlusion.      Impression: MRI Brain:  1.  A 2-3 mm focus of hyperintense DWI signal is present in the right  parietal lobe, too small to definitely characterize, although tiny  focus of acute or early subacute infarct is not entirely excluded. No  large vascular territory infarction is identified.  2. Mild volume of FLAIR and T2 signal changes are present in the  periventricular and subcortical white matter of bilateral cerebral  hemispheres, nonspecific findings favored to represent sequela of  microvascular disease.      MRA:  1.  MRA source images are significantly degraded by motion in the  head and neck.  2. Within limitations of the study, no definite evidence of large  vessel occlusion is identified in the proximal intracranial  circulation. No high-grade stenosis is identified in the large  arteries of the neck, although mild narrowing is not excluded due to  motion.      MACRO:  None      Signed by: Joseph Reyes 8/8/2025 12:19 AM  Dictation workstation:   [id number]      Physical Exam  Vitals reviewed.   Constitutional:       Appearance: Normal appearance.   HENT:      Head: Normocephalic and atraumatic.     Eyes:      Extraocular Movements: Extraocular movements intact.      Conjunctiva/sclera: Conjunctivae normal.       Cardiovascular:      Rate and Rhythm: Normal rate and regular rhythm.   Pulmonary:      Effort: Pulmonary effort is normal.      Breath sounds: Normal breath sounds. No wheezing, rhonchi or rales.   Abdominal:      General: Bowel sounds are normal.      Palpations: Abdomen is soft.      Tenderness: There is no abdominal tenderness.     Musculoskeletal:      Cervical back: Normal range of motion and neck supple.     Skin:     General: Skin is warm and dry.     Neurological:      General: No  focal deficit present.      Mental Status: He is alert and oriented to person, place, and time.         Relevant Results  Lab Results   Component Value Date    GLUCOSE 82 08/08/2025    CALCIUM 8.6 08/08/2025     (L) 08/08/2025    K 3.7 08/08/2025    CO2 30 08/08/2025    CL 96 (L) 08/08/2025    BUN 17 08/08/2025    CREATININE 0.54 08/08/2025      Lab Results   Component Value Date    WBC 5.8 08/08/2025    HGB 14.2 08/08/2025    HCT 40.7 (L) 08/08/2025    MCV 90 08/08/2025     08/08/2025    Carotid duplex bilateral  Result Date: 8/8/2025  CONCLUSIONS:     Right Carotid: Findings are consistent with less than 50% stenosis of the right proximal internal carotid artery. Right external carotid artery appears patent with no evidence of stenosis. The right vertebral artery is patent with antegrade flow. No evidence of hemodynamically significant stenosis in the right subclavian artery.     Left Carotid: Findings are consistent with less than 50% stenosis of the left proximal internal carotid artery. Left external carotid artery appears patent with no evidence of stenosis. The left vertebral artery is patent with antegrade flow. No evidence of hemodynamically significant stenosis in the left subclavian artery.      Transthoracic Echo Complete  Result Date: 8/8/2025  CONCLUSIONS:  1. Left ventricular ejection fraction is normal by visual estimate at 60-65%.  2. Spectral Doppler shows a Grade I (impaired relaxation pattern) of left ventricular diastolic filling with normal left atrial filling pressure.  3. There is normal right ventricular global systolic function.  4. Mildly enlarged right ventricle.  5. The left atrial size is moderately dilated.  6. There is moderate to severe aortic valve cusp calcification. Tyrel Hudson MD Electronically signed on 8/8/2025 at 9:54:29 AM  ** Final **     MR brain wo IV contrast  Result Date: 8/8/2025  MRI Brain: 1.  A 2-3 mm focus of hyperintense DWI signal is present in the  right parietal lobe, too small to definitely characterize, although tiny focus of acute or early subacute infarct is not entirely excluded. No large vascular territory infarction is identified. 2. Mild volume of FLAIR and T2 signal changes are present in the periventricular and subcortical white matter of bilateral cerebral hemispheres, nonspecific findings favored to represent sequela of microvascular disease.   MRA: 1.  MRA source images are significantly degraded by motion in the head and neck. 2. Within limitations of the study, no definite evidence of large vessel occlusion is identified in the proximal intracranial circulation. No high-grade stenosis is identified in the large arteries of the neck, although mild narrowing is not excluded due to motion.   MACRO: None   Signed by: Joseph Reyes 8/8/2025 12:19 AM Dictation workstation:   [id number]    MR angio neck wo IV contrast  Result Date: 8/8/2025  MRI Brain: 1.  A 2-3 mm focus of hyperintense DWI signal is present in the right parietal lobe, too small to definitely characterize, although tiny focus of acute or early subacute infarct is not entirely excluded. No large vascular territory infarction is identified. 2. Mild volume of FLAIR and T2 signal changes are present in the periventricular and subcortical white matter of bilateral cerebral hemispheres, nonspecific findings favored to represent sequela of microvascular disease.   MRA: 1.  MRA source images are significantly degraded by motion in the head and neck. 2. Within limitations of the study, no definite evidence of large vessel occlusion is identified in the proximal intracranial circulation. No high-grade stenosis is identified in the large arteries of the neck, although mild narrowing is not excluded due to motion.   MACRO: None   Signed by: Joseph Reyes 8/8/2025 12:19 AM Dictation workstation:   [id number]    MR angio head wo IV contrast  Result Date: 8/8/2025  MRI Brain: 1.  A  2-3 mm focus of hyperintense DWI signal is present in the right parietal lobe, too small to definitely characterize, although tiny focus of acute or early subacute infarct is not entirely excluded. No large vascular territory infarction is identified. 2. Mild volume of FLAIR and T2 signal changes are present in the periventricular and subcortical white matter of bilateral cerebral hemispheres, nonspecific findings favored to represent sequela of microvascular disease.   MRA: 1.  MRA source images are significantly degraded by motion in the head and neck. 2. Within limitations of the study, no definite evidence of large vessel occlusion is identified in the proximal intracranial circulation. No high-grade stenosis is identified in the large arteries of the neck, although mild narrowing is not excluded due to motion.   MACRO: None   Signed by: Joseph Reyes 8/8/2025 12:19 AM Dictation workstation:   [id number]    CT head wo IV contrast  Result Date: 8/7/2025  NO ACUTE INTRACRANIAL PROCESS   MACRO: None   Signed by: Rj Tejeda 8/7/2025 1:31 PM Dictation workstation:   [id number]    XR chest 1 view  Result Date: 8/7/2025  1. Mild enlargement of the cardiac silhouette without acute cardiopulmonary process.       MACRO: None   Signed by: Jabier Whitfield 8/7/2025 12:20 PM Dictation workstation:   [id number]                          Assessment & Plan  Weakness    Right-sided weakness  Rule out TIA/CVA  Noted right upper and lower extremity weakness and poor coordination  Monitor on telemetry  ASA/statin, hold Eliquis for now  permissive hypertension  lipid panel in AM  MRI/MRA showed possible acute vs early subacute infarct in the right parietal lobe  US carotid with <50% stenosis B/L  Echocardiogram showed normal EF  neurostroke assessment  consult neurology, appreciate recommendations     Hypertension  Not on any antihypertensives  Monitor blood pressure  Permissive  hypertension    Hyperlipidemia  Statin    Glaucoma  Resume home eyedrops    History of aortic valve stenosis status post TAVR  On Eliquis, will resume pending MRI results    Plan  Monitor on telemetry  Stroke workup  PT/OT  DVT prophylaxis  Fall precautions  Possible discharge tonight pending neuro to see    CODE STATUS: Discussed with the patient's daughter and niece, patient is a full code             Aure Mayers, APRN-CNP

## 2025-08-08 NOTE — PROGRESS NOTES
Physical Therapy    Physical Therapy Evaluation    Patient Name: Kolton Acosta  MRN: [id number]  Department: [facility] NONV1  Room: [address] [place], [place] [address]  Today's Date: 8/8/2025   Time Calculation  Start Time: 1029  Stop Time: 1055  Time Calculation (min): 26 min    Assessment/Plan   PT Assessment  PT Assessment Results: Decreased strength, Decreased endurance, Decreased mobility, Impaired balance, Decreased coordination, Decreased cognition, Impaired judgement, Decreased safety awareness, Impaired vision, Impaired hearing, Impaired sensation, Impaired tone, Decreased skin integrity, Pain  Rehab Prognosis: Good  Barriers to Discharge Home: Physical needs  Physical Needs: 24hr mobility assistance needed, 24hr ADL assistance needed, High falls risk due to function or environment  Evaluation/Treatment Tolerance: Patient limited by fatigue  Medical Staff Made Aware: Yes  Strengths: Support of Caregivers  Barriers to Participation: Comorbidities  End of Session Communication: Bedside nurse  Assessment Comment: The patient is a 89 y.o. male admitted to the hospital for increasing weakness. The patient currently requires modAx2 for bed mobility, transfers, and bedside ambulation with RW. The patient would continue to benefit from skilled therapy services to address functional deficits.  End of Session Patient Position: Bed, 3 rail up, Alarm on  IP OR SWING BED PT PLAN  Inpatient or Swing Bed: Inpatient  PT Plan  Treatment/Interventions: Bed mobility, Transfer training, Gait training, Balance training, Neuromuscular re-education, Strengthening, Endurance training, Range of motion, Therapeutic exercise, Therapeutic activity, Home exercise program  PT Plan: Ongoing PT  PT Frequency: 3 times per week (during this acute inpatient hospitalization)  PT Discharge Recommendations: Moderate intensity level of continued care (Based on current functional status and rehab potential, patient is anticipated to tolerate and benefit from 5 or more days per  week of skilled rehabilitative therapy after discharge from this acute inpatient hospitalization.)  Equipment Recommended upon Discharge: Wheeled walker  PT Recommended Transfer Status: Assist x2  PT - OK to Discharge: Yes    Subjective     PT Visit Info:  PT Received On: 08/08/25  General Visit Information:  General  Reason for Referral: weakness, impaired ADL's/mobility  Referred By: IRINA Armenta-CNP  Past Medical History Relevant to Rehab: anemia, HTN, glaucoma, neuropathy, clavicle fx, aortic valve stenosis s/p AVR, a-fib, chronic systolic HF, CAD, hyperlipidemia  Family/Caregiver Present: No  Co-Treatment: OT  Co-Treatment Reason: To optimize pt safety and outcomes  Prior to Session Communication: Bedside nurse  Patient Position Received: Bed, 3 rail up, Alarm on  Preferred Learning Style: verbal, written, auditory  General Comment: Pt is a 89 yo male admitted to the hospital with weakness. Pt apparently has also had difficulty feeding himself for the past week.  Home Living:  Home Living  Type of Home: Assisted living ([facility])  Home Adaptive Equipment: Walker rolling or standard, Other (Comment) (rollator)  Home Layout: One level  Home Access: Level entry  Bathroom Shower/Tub: Walk-in shower  Bathroom Toilet: Handicapped height  Bathroom Equipment: Grab bars in shower  Home Living Comments: [facility]  Prior Level of Function:  Prior Function Per Pt/Caregiver Report  Level of [place]: Needs assistance with ADLs, Needs assistance with homemaking  Receives Help From: Personal care attendant  ADL Assistance: Needs assistance (assist with ADLs)  Homemaking Assistance: Needs assistance (completed by staff)  Ambulatory Assistance: Needs assistance (rollator with supervision)  Vocational: Retired  Hand Dominance: Right  Prior Function Comments: Pt is a poor historian, so unable to obtain accurate PLOF.  Precautions:  Precautions  Hearing/Visual Limitations: h/o glaucoma, very [place] and unable to  don hearing aids d/t poor dexterity  Medical Precautions: Fall precautions, Other (comment) (aspiration precautions)  Precautions Comment: h/o recent falls      Date/Time Vitals Session Patient Position Pulse Resp SpO2 BP MAP (mmHg)    08/08/25 1100 --  --  69  18  100 %  141/76  90      Vital Signs Comment: pt on room air     Objective   Pain:  Pain Assessment  Pain Assessment: FLACC (Face, Legs, Activity, Cry, Consolability)  0-10 (Numeric) Pain Score: 0 - No pain  Cognition:  Cognition  Overall Cognitive Status: Impaired  Orientation Level: Disoriented to situation, Disoriented to time, Disoriented to place  Following Commands: Follows one step commands with repetition  Safety Judgment: Decreased awareness of need for assistance  Cognition Comments: pleasantly confused  Processing Speed: Delayed    General Assessments:  General Observation  General Observation: pleasantly confused; [place]    Activity Tolerance  Endurance: Decreased tolerance for upright activites  Activity Tolerance Comments: limited due to fatigue and weakness  Rate of Perceived Exertion (RPE): 4/10    Sensation  Sensation Comment: difficult to accurately assess    Strength  Strength Comments: BLE grossly 3-3+/5  Coordination  Coordination Comment: decreased accuracy of fine and gross motor movement UE and LE    Postural Control  Posture Comment: forward head; rounded shoulders    Static Sitting Balance  Static Sitting-Balance Support: Feet supported, Bilateral upper extremity supported  Static Sitting-Level of Assistance: Contact guard  Static Sitting-Comment/Number of Minutes: intermittent retrolean    Static Standing Balance  Static Standing-Balance Support: Bilateral upper extremity supported (RW)  Static Standing-Level of Assistance: Moderate assistance (x2)  Static Standing-Comment/Number of Minutes: forward flexed posture  Dynamic Standing Balance  Dynamic Standing-Balance Support: Bilateral upper extremity supported (RW)  Dynamic  Standing-Level of Assistance: Moderate assistance (x2)  Dynamic Standing-Comments: ModAx2 for steps with RW; elisa soft knees  Functional Assessments:  Bed Mobility  Bed Mobility: Yes  Bed Mobility 1  Bed Mobility 1: Supine to sitting, Sitting to supine  Level of Assistance 1: Moderate assistance, +2  Bed Mobility Comments 1: assist with trunk up/down and BLE in/out of bed; draw sheet utilized for scooting hips; VCs for safe sequencing and hand placement    Transfers  Transfer: Yes  Transfer 1  Transfer From 1: Bed to, Stand to  Transfer to 1: Stand, Bed  Technique 1: Sit to stand, Stand to sit  Transfer Device 1: Walker  Transfer Level of Assistance 1: Moderate assistance, +2  Trials/Comments 1: VCs for safe hand placement; modAx2 for safe completion into standing position    Ambulation/Gait Training  Ambulation/Gait Training Performed: Yes  Ambulation/Gait Training 1  Surface 1: Level tile  Device 1: Rolling walker  Assistance 1: Moderate assistance  Quality of Gait 1: Narrow base of support, Shuffling gait  Comments/Distance (ft) 1: 5 steps at bedside with RW and modAx2; unsteady steps with impaired elisa heel strike coordination  Extremity/Trunk Assessments:  RLE   RLE :  (grossly 3-3+/5)  LLE   LLE :  (grossly 3-3+/5)  Outcome Measures:  [facility] Basic Mobility  Turning from your back to your side while in a flat bed without using bedrails: A little  Moving from lying on your back to sitting on the side of a flat bed without using bedrails: A lot  Moving to and from bed to chair (including a wheelchair): A lot  Standing up from a chair using your arms (e.g. wheelchair or bedside chair): A lot  To walk in hospital room: A lot  Climbing 3-5 steps with railing: Total  Basic Mobility - Total Score: 12    Encounter Problems       Encounter Problems (Active)       PT Problem       Strength (Progressing)       Start:  08/08/25    Expected End:  09/08/25       The patient will demonstrate an overall strength of 4/5 in BLE to  assist with completion of functional mobility.           Functional Mobility (Progressing)       Start:  08/08/25    Expected End:  09/08/25       The patient will complete functional mobility (bed mobility, transfers, etc.) at a close supervision level with LRAD by DC.           Ambulation (Progressing)       Start:  08/08/25    Expected End:  09/08/25       The patient will be able to ambulate at a close supervision level for >30ftx1 with RW.          Balance (Progressing)       Start:  08/08/25    Expected End:  09/08/25       The patient will demonstrate good dynamic standing balance during activity with LRAD.                 Education Documentation  Mobility Training, taught by Ankita Saeed PT at 8/8/2025 12:55 PM.  Learner: Patient  Readiness: Acceptance  Method: Explanation  Response: Needs Reinforcement  Comment: education provided on PT POC    Education Comments  No comments found.

## 2025-08-08 NOTE — PROGRESS NOTES
Occupational Therapy    Evaluation    Patient Name: Kolton Acosta  MRN: [id number]  Department: [facility] NONV1  Room: [address] [place], [place] [address]  Today's Date: 8/8/2025  Time Calculation  Start Time: 1025  Stop Time: 1050  Time Calculation (min): 25 min        Assessment:  OT Assessment: Pt presents on eval with increased overall weakness, cognitive deficits noted, poor activity tolerance, poor overall coordination, and poor overall balance affecting his self-care and functional transfers/mobility. Pt will benefit from continued skilled OT to address these deficits and facilitate returning to functional baseline.  Prognosis: Fair  Barriers to Discharge Home: Cognition needs, Physical needs  Cognition Needs: 24hr supervision for safety awareness needed, Medication and/or medical management daily assist needed, Recollection or understanding of home exercise program limited, Insight of patient limited regarding functional ability/needs, Cognition-related high falls risk  Physical Needs: Ambulating household distances limited by function/safety, 24hr mobility assistance needed, 24hr ADL assistance needed, High falls risk due to function or environment  Evaluation/Treatment Tolerance: Patient limited by fatigue  End of Session Communication: Bedside nurse  End of Session Patient Position: Bed, 3 rail up, Alarm on  OT Assessment Results: Decreased ADL status, Decreased upper extremity range of motion, Decreased upper extremity strength, Decreased safe judgment during ADL, Decreased cognition, Decreased endurance, Decreased sensation, Visual deficit, Decreased fine motor control, Decreased functional mobility, Decreased gross motor control, Decreased trunk control for functional activities  Strengths: Support of Caregivers  Barriers to Participation: Ability to acquire knowledge, Comorbidities, Insight into problems    Plan:  Treatment Interventions: ADL retraining, Functional transfer training, Visual perceptual retraining, UE  strengthening/ROM, Endurance training, Cognitive reorientation, Patient/family training, Equipment evaluation/education, Neuromuscular reeducation, Fine motor coordination activities, Compensatory technique education  OT Frequency: 4 times per week (during this acute inpatient hospitalization)  OT Discharge Recommendations: Moderate intensity level of continued care, 24 hr supervision due to cognition, Other (Comment) (Based on current functional status and rehab potential, patient is anticipated to tolerate and benefit from 5 or more days per week of skilled rehabilitative therapy after discharge from this acute inpatient hospitalization.)  Equipment Recommended upon Discharge: Wheeled walker  OT Recommended Transfer Status: Assist of 2  OT - OK to Discharge: Yes      Subjective     OT Visit Info:  OT Received On: 08/08/25    General:  General  Reason for Referral: weakness, impaired ADL's/mobility  Referred By: IRINA Armenta-CNP  Past Medical History Relevant to Rehab: anemia, HTN, glaucoma, neuropathy, clavicle fx, aortic valve stenosis s/p AVR, a-fib, chronic systolic HF, CAD, hyperlipidemia  Family/Caregiver Present: No  Co-Treatment: PT  Co-Treatment Reason: To optimize pt safety and outcomes  Prior to Session Communication: Bedside nurse  Patient Position Received: Bed, 3 rail up, Alarm on  General Comment: Pt is a 89 yo male admitted to the hospital with weakness. Pt apparently has also had difficulty feeding himself for the past week.    Precautions:  Hearing/Visual Limitations: h/o glaucoma, very [place] and unable to don hearing aids d/t poor dexterity  Medical Precautions: Fall precautions, Other (comment) (aspiration precautions)    Pain:  Pain Assessment  Pain Assessment: FLACC (Face, Legs, Activity, Cry, Consolability)  0-10 (Numeric) Pain Score: 0 - No pain    Objective     Cognition:  Overall Cognitive Status: Impaired  Orientation Level: Disoriented to situation, Disoriented to time,  Disoriented to place  Following Commands: Follows one step commands with repetition  Safety Judgment: Decreased awareness of need for assistance  Problem Solving: Assistance required to identify errors made  Cognition Comments: Pt is pleasantly confused and has a tendency to refer to his long term memories during conversation regarding his  background as well as his dad's too.  Processing Speed: Delayed    Home Living:  Type of Home: Assisted living (Per chart, pt recently moved into an [facility].)  Lives With: Other (Comment) (care staff?)  Home Living Comments: Pt is a poor historian and no CC notes at this time.    Prior Function:  Level of [place]: Needs assistance with ADLs, Needs assistance with homemaking  Receives Help From: Other (Comment) (care staff at [facility]?)  ADL Assistance: Needs assistance (projected to need some assist at least at his [facility])  Homemaking Assistance: Needs assistance (completed by staff at [facility]?)  Ambulatory Assistance: Needs assistance (most likely using a RW)  Vocational: Retired  Hand Dominance: Right  Prior Function Comments: Pt is a poor historian, so unable to obtain accurate PLOF.    ADL:  Eating Assistance: Moderate  Eating Deficit: Other (Comment) (pt with poor dexterity and he is currently significantly weak)  Grooming Assistance: Maximal  Bathing Assistance: Maximal  UE Dressing Assistance: Maximal  LE Dressing Assistance: Total  LE Dressing Deficit: Don/doff R sock, Don/doff L sock  Toileting Assistance with Device: Total    Activity Tolerance:  Activity Tolerance Comments: Poor+    Bed Mobility/Transfers: Bed Mobility  Bed Mobility:  (Pt completed supine<>sit in bed with mod A x2 for trunk and BLE's as well as increased time to complete.)    Transfers  Transfer:  (Pt completed sit<>stand with mod A x2 d/t increased overall weakness and poor balance.)    Functional Mobility:  Functional Mobility  Functional Mobility Performed:  (Pt able to take a few lateral steps towards  the HOB using a RW with mod A x2 d/t poor balance and increased overall weakness.)    Sitting Balance:  Static Sitting Balance  Static Sitting-Balance Support: Bilateral upper extremity supported, Feet supported  Static Sitting-Level of Assistance: Minimum assistance, Contact guard    Standing Balance:  Static Standing Balance  Static Standing-Balance Support: Bilateral upper extremity supported  Static Standing-Level of Assistance: Moderate assistance (x2)     Sensation:  Sensation Comment: Pt appears to have numbness in his bilateral hands/digits as evidenced by how he handles his hearing aids on eval    Strength:  Strength Comments: BUE shoulders 2-/5, distally 3+/5    Coordination:  Movements are Fluid and Coordinated: No  Upper Body Coordination: poor gross/fine motor     Hand Function:  Gross Grasp: Impaired  Coordination: Impaired      Outcome Measures:[facility] Daily Activity  Putting on and taking off regular lower body clothing: Total  Bathing (including washing, rinsing, drying): A lot  Putting on and taking off regular upper body clothing: A lot  Toileting, which includes using toilet, bedpan or urinal: Total  Taking care of personal grooming such as brushing teeth: A lot  Eating Meals: A lot  Daily Activity - Total Score: 10    Education Documentation  Body Mechanics, taught by Kenneth Prieto Jr., OT at 8/8/2025  1:00 PM.  Learner: Patient  Readiness: Acceptance  Method: Explanation  Response: Needs Reinforcement  Comment: Education and verbal cues provided throughout eval.    Home Exercise Program, taught by Kenneth Prieto Jr., OT at 8/8/2025  1:00 PM.  Learner: Patient  Readiness: Acceptance  Method: Explanation  Response: Needs Reinforcement  Comment: Education and verbal cues provided throughout eval.    ADL Training, taught by Kenneth Prieto Jr., OT at 8/8/2025  1:00 PM.  Learner: Patient  Readiness: Acceptance  Method: Explanation  Response: Needs Reinforcement  Comment: Education and verbal cues  provided throughout eval.    Education Comments  No comments found.    Goals:  Encounter Problems       Encounter Problems (Active)       OT Goals       Pt will complete self-feeding with setup. (Progressing)       Start:  08/08/25    Expected End:  09/08/25            Pt will complete all grooming tasks with CGA in standing. (Progressing)       Start:  08/08/25    Expected End:  09/08/25            Pt will complete all toileting tasks with CGA. (Progressing)       Start:  08/08/25    Expected End:  09/08/25            Pt will complete all functional transfers and mobility with CGA while using a RW. (Progressing)       Start:  08/08/25    Expected End:  09/08/25

## 2025-08-08 NOTE — PROGRESS NOTES
TCC spoke with daughter Tremayne. Introduced self and explained role. Patient lives home at Assisted living. No reports of smoking or alcohol use.  PCP is Ajay Griffith . [facility] is pharmacy of choice for medications would like bedside delivery for this admission. Patient has established LW and POA. Received paperwork at this time. Plan is to return to assisted living. Declining SNF at this time, agreeable to [facility]   Grand daughter to transport, she is a PT MD.     TCC spoke to rocio at [facility] made aware of pts return, state they can set up HHC with [facility] if we send referral with pt upon discharge     NO BARRIERS TO DISCHARGE FROM CARE TRANSITIONS         08/08/25 1351   Discharge Planning   Living Arrangements Alone   Support Systems Children;Family members   Assistance Needed patient uses a walker at AL   Type of Residence Assisted living   Care [facility] or [facility] Acute Services In home services   Expected Discharge Disposition Home H  ([facility])   Does the patient need discharge transport arranged? No   Financial Resource Strain   How hard is it for you to pay for the very basics like food, housing, medical care, and heating? Not hard   Housing Stability   In the last 12 months, was there a time when you were not able to pay the mortgage or rent on time? N   In the past 12 months, how many times have you moved where you were living? 0   At any time in the past 12 months, were you homeless or living in a shelter (including now)? N   Transportation Needs   In the past 12 months, has lack of transportation kept you from medical appointments or from getting medications? no   In the past 12 months, has lack of transportation kept you from meetings, work, or from getting things needed for daily living? No   Patient Choice   Provider Choice list and CMS website (https://medicare.gov/care-compare#search) for post-acute Quality and Resource Measure Data were provided and reviewed  with: Family   Patient / Family choosing to utilize agency / facility established prior to hospitalization Yes

## 2025-08-08 NOTE — DISCHARGE SUMMARY
Discharge Diagnosis  Weakness    Issues Requiring Follow-Up  Follow-up with PCP and neurology outpatient    Test Results Pending At Discharge  Pending Labs       Order Current Status    Extra Urine Gray Tube In process    Urinalysis with Reflex Culture and Microscopic In process            Hospital Course   Kolton Acosta is a 89 y.o. male with a past medical history of aortic valve stenosis status post TAVR, chronic systolic heart failure, coronary artery disease, hypertension, hyperlipidemia, paroxysmal atrial fibrillation, on Eliquis, who presented to the emergency department with complaints of weakness.  Patient was living independently up until about a week ago.  He recently moved into an assisted living facility.  Typically is able to walk with a walker and feed himself.  He was noted to have increased weakness and difficulty feeding himself.  His symptoms improved however the next day found to have right sided weakness and increased difficulty eating.  On exam patient noted to have right upper and lower extremity weakness in addition to difficulty with coordination.  Patient also reported numbness to bilateral upper extremities.Patient does have some intermittent confusion, forgetfulness.  Niece at bedside and able to assist with history.  Also spoke with his daughter Tremayne over the phone.     In the ED: Glucose 113, sodium 130, potassium 3.1, BUN/creatinine 17/0.63, magnesium 1.79, WBC 4.1, H&H 14.7/43.1, platelets 158.  UA was negative.  CT of the head showed no acute intracranial process.  Chest x-ray showed mild enlargement of the cardiac silhouette without acute cardiopulmonary process.  Patient was given aspirin, statin, IV fluids, and potassium replacement in the ED.  Admitted to [facility] for further evaluation and treatment.    Patient admitted to [facility] for further evaluation and treatment.  Neurology was consulted.  Patient was started on aspirin and statin.  MRI showed possible acute versus  early subacute infarct in the right parietal lobe.  Discussed with neurology, low suspicion for stroke.  Suspect generalized weakness, deconditioning.  Patient cleared for discharge by neurology, continue Eliquis.  PT/OT.  Patient was started on atorvastatin as well.  Stable for discharge back to assisted living with home health care today.    Pertinent Physical Exam At Time of Discharge  Physical Exam  itals reviewed.   Constitutional:       Comments: Elderly.  Limited historian.   HENT:      Head: Normocephalic and atraumatic.      Comments: Hard of hearing     Nose: Nose normal.      Mouth/Throat:      Mouth: Mucous membranes are moist.      Eyes:      Extraocular Movements: Extraocular movements intact.      Conjunctiva/sclera: Conjunctivae normal.         Cardiovascular:      Rate and Rhythm: Normal rate and regular rhythm.   Pulmonary:      Effort: Pulmonary effort is normal.      Breath sounds: Normal breath sounds. No wheezing, rhonchi or rales.   Abdominal:      General: Bowel sounds are normal.      Palpations: Abdomen is soft.      Tenderness: There is no abdominal tenderness.      Musculoskeletal:         General: Normal range of motion.      Cervical back: Normal range of motion and neck supple.      Skin:     General: Skin is warm and dry.      Capillary Refill: Capillary refill takes less than 2 seconds.      Comments: Scattered ecchymotic areas to bilateral upper extremities, noted healing hematoma to left lower back      Neurological:      General: No focal deficit present.      Mental Status: He is alert and oriented to person, place, and time.      Comments: Intermittent forgetfulness   Psychiatric:         Mood and Affect: Mood normal.         Behavior: Behavior normal.     Home Medications     Medication List      ASK your doctor about these medications     apixaban 5 mg tablet; Commonly known as: Eliquis; Take 0.5 tablets (2.5   mg) by mouth 2 times a day.   cholecalciferol 50 mcg (2,000 units)  tablet; Commonly known as: Vitamin   D-3   Vitamin B-12 1,000 mcg tablet; Generic drug: cyanocobalamin       Outpatient Follow-Up  Future Appointments   Date Time Provider Department Center   2/19/2026  3:30 PM Aajy Griffith MD [id number] [facility]     Time spent in discharge: 35 minutes    Aure Mayers, IRINA-CNP

## 2025-08-08 NOTE — CARE PLAN
The patient's goals for the shift include      The clinical goals for the shift include feel stronger    Over the shift, the patient did not make progress toward the following goals. Barriers to progression include weakness. Recommendations to address these barriers include monitor VS, monitor labs, monitor I/O's, continuous telemetry monitoring, promote safe mobilization out of bed, promote skin integrity.

## 2025-08-08 NOTE — CONSULTS
"    Inpatient consult to Neurology  Consult performed by: Tory Melvin MD  Consult ordered by: Aure Mayers, IRINA-CNP        General Neurology Consult Note    HPI  Kolton Acosta is a 89 y.o.  male with PMH of arotic valve stenosis s/p TAVR, CHF, CAD, HTN, HLD, pAfib who presented to [facility] on 8/7/2025 with weakness, neurology consulted for concern for R weakness.    Pt unable to provide hx. He states he is well and is unsure why he is in the hospital. When asked about issues he states \"do you know how old I am? And how many surgeries I have had in the past?\".      Per chart review, pt came to ED because family noted R sided weakness and difficulty eating x 2 days. Pt has moved to assisted living facility about a week ago. On the day of presentation pt noted to have difficulty ambulating, difficulty with fork, trouble holding juice. He normally ambulates with a cane. BUE numbness. Admitted to further evaluation.    In the ED, /71, NIHSS not reported.     Review of stroke workup:   MRI was done since admission. Negative for acute stroke except for subtle R parieto-occipital punctate DWI lesion without obvious ADC correlate, certainly would not explain any weakness, definitely not R sided weakness.  MRA no definitive stenosis.   Carotid ultrasound <50% stenosis bilaterally.   LDL 89, A1c 5.2   Pt is on eliquis for afib.       Past Medical History  Medical History[1]  Surgical History  Surgical History[2]  Social History  Social History[3]  Allergies  Patient has no known allergies.    Home Medications  Current Outpatient Medications   Medication Instructions    apixaban (ELIQUIS) 2.5 mg, oral, 2 times daily    cholecalciferol (VITAMIN D-3) 2,000 Units, oral, Daily    cyanocobalamin (VITAMIN B-12) 1,000 mcg, oral, Daily          Objective   24h Vitals  Heart Rate:  [53-71]   Temp:  [36.2 °C (97.2 °F)-37.3 °C (99.1 °F)]   Resp:  [16-18]   BP: (118-144)/(57-89)   SpO2:  [96 %-100 %]      Physical " Exam  Neurological Exam  Physical Exam  MENTAL STATUS:  General appearance: in NAD  Orientation: [place] to self, month   Language: fluent, no dysarthria, follows appendicular commands   Concentration: Intact  Fund of knowledge: fair     CRANIAL NERVES:  - Fundoscopic exam: Deferred   - II/III: PERRL  - II:  Visual fields intact to confrontation bilaterally   - III, IV, VI: EOMI to pursuit without nystagmus  - V: V1-V3 sensation intact bilaterally  - VII: Face muscles symmetric with smile and eye closure  - VIII: Intact to finger rub  - IX, X: Palate elevated symmetrically bilaterally, no hoarseness  - XI: 4/5 strength on shoulder shrugging bilaterally  - XII: Tongue midline without atrophy or fasciculation    MOTOR: Tone and bulk normal in all extremities  STRENGTH: 4/5 strength tested proximally and distally in BUE and BLE except for finger flexions bilaterally 4-/5     REFLEXES: R L  Biceps   +2 +2  Brachioradialis +2 +2  Patellar   +2 +1  Achilles   +1 +1  Plantar   Mute mute   No clonus  COORDINATION: Intact on finger to nose bl, intact on heel to shin bl  SENSORY: Intact to light touch in BUE and BLE  GAIT: deferred       NIHSS:  Level of Consciousness: 0=alert      LOC questions: 0=answers both questions      LOC commands: 0=performs both  Best Gaze: 0=normal  Visual: 0=normal  Facial Palsy: 0=normal  Motor:      Motor Arm (Left): 0=normal      Motor Arm (Right): 0=normal      Motor Leg (Left): 0=left leg normal      Motor Leg (Right): 0=right leg normal  Limb Ataxia: 0=absent  Sensory: 0=normal  Best Language: 0=no aphasia  Dysarthria: 0=normal  Extinction and Inattention: 0=no abnormality     Recent Labs  Results from last 72 hours   Lab Units 08/08/25  0610 08/07/25  1147   SODIUM mmol/L 131* 130*   POTASSIUM mmol/L 3.7 3.1*   BUN mg/dL 17 17   CREATININE mg/dL 0.54 0.63   CALCIUM mg/dL 8.6 9.0   MAGNESIUM mg/dL  --  1.79      Results from last 72 hours   Lab Units 08/08/25  0610 08/07/25  1147   WBC AUTO  x10*3/uL 5.8 4.1*   HEMOGLOBIN g/dL 14.2 14.7   HEMATOCRIT % 40.7* 43.1   PLATELETS AUTO x10*3/uL 154 158            Lab Results   Component Value Date    HGBA1C 5.2 08/08/2025    HGBA1C 5.6 08/13/2019        Imaging  MRI: MR brain wo IV contrast  Result Date: 8/8/2025  MRI Brain: 1.  A 2-3 mm focus of hyperintense DWI signal is present in the right parietal lobe, too small to definitely characterize, although tiny focus of acute or early subacute infarct is not entirely excluded. No large vascular territory infarction is identified. 2. Mild volume of FLAIR and T2 signal changes are present in the periventricular and subcortical white matter of bilateral cerebral hemispheres, nonspecific findings favored to represent sequela of microvascular disease.   MRA: 1.  MRA source images are significantly degraded by motion in the head and neck. 2. Within limitations of the study, no definite evidence of large vessel occlusion is identified in the proximal intracranial circulation. No high-grade stenosis is identified in the large arteries of the neck, although mild narrowing is not excluded due to motion.   MACRO: None   Signed by: Joseph Reyes 8/8/2025 12:19 AM Dictation workstation:   [id number]    CT Head: CT head wo IV contrast  Result Date: 8/7/2025  NO ACUTE INTRACRANIAL PROCESS   MACRO: None   Signed by: Rj Tejeda 8/7/2025 1:31 PM Dictation workstation:   [id number]    CT head wo IV contrast  Result Date: 6/30/2025  IMPRESSION: Age related atrophic changes No acute intracranial pathology Transcriptionist: NANCY   Transcribe Date/Time: Jun 30 2025 12:28P Dictated by : ROGELIO MARQUES MD This examination was interpreted and the report reviewed and electronically signed by: ROGELIO MARQUES MD on Jun 30 2025 12:36PM  EST    CTA: No CT Angio Head Results found for the past  year  Echo: No echocardiogram results found for the past 12 months             Assessment/Plan   Kolton Acosta is a 89 y.o.  male  with PMH of arotic valve stenosis s/p TAVR, CHF, CAD, HTN, HLD, pAfib who presented to [facility] on 8/7/2025 with weakness, neurology consulted for concern for R weakness. MRI was done since admission. Negative for acute stroke except for subtle R parieto-occipital punctate DWI lesion without obvious ADC correlate, certainly would not explain any weakness, definitely not R sided weakness. On exam no obvious asymmetry in strength.     Diagnoses:  Generalized weakness, ?deconditioning     Recommendations:  Recommend PT/OT  Continue eliquis       I personally spent 60 minutes today, exclusive of procedures, providing care for this patient, including preparation, face to face time, documentation and other services such as review of medical records, diagnostic result, patient education, counseling, coordination of care as specified in the encounter.        [1]   Past Medical History:  Diagnosis Date    A-fib (Multi)     Anxiety     Aortic stenosis 09/10/2024    Cataract     Glaucoma     HL (hearing loss)    [2]   Past Surgical History:  Procedure Laterality Date    ANOMALOUS PULMONARY VENOUS RETURN REPAIR, TOTAL      AORTIC VALVE REPLACEMENT  09/10/2024    CARDIAC CATHETERIZATION      CARDIAC VALVE REPLACEMENT      WISDOM TOOTH EXTRACTION  01/2024    12 teeth removed   [3]   Social History  Tobacco Use    Smoking status: Never     Passive exposure: Never    Smokeless tobacco: Never   Vaping Use    Vaping status: Never Used   Substance Use Topics    Alcohol use: Never    Drug use: Never

## 2025-08-08 NOTE — CARE PLAN
Problem: General Stroke  Goal: Establish a mutual long term goal with patient by discharge  Outcome: Not Progressing  Goal: Demonstrate improvement in neurological exam throughout the shift  Outcome: Not Progressing  Goal: Maintain BP within ordered limits throughout shift  Outcome: Not Progressing  Goal: Participate in treatment (ie., meds, therapy) throughout shift  Outcome: Not Progressing  Goal: No symptoms of aspiration throughout shift  Outcome: Not Progressing  Goal: No symptoms of hemorrhage throughout shift  Outcome: Not Progressing  Goal: Tolerate enteral feeding throughout shift  Outcome: Not Progressing  Goal: Decreased nausea/vomiting throughout shift  Outcome: Not Progressing  Goal: Controlled blood glucose throughout shift  Outcome: Not Progressing  Goal: Out of bed three times today  Outcome: Not Progressing     Problem: Pain - Adult  Goal: Verbalizes/displays adequate comfort level or baseline comfort level  Outcome: Not Progressing     Problem: Safety - Adult  Goal: Free from fall injury  Outcome: Not Progressing     Problem: Discharge Planning  Goal: Discharge to home or other facility with appropriate resources  Outcome: Not Progressing     Problem: Chronic Conditions and Co-morbidities  Goal: Patient's chronic conditions and co-morbidity symptoms are monitored and maintained or improved  Outcome: Not Progressing     Problem: Nutrition  Goal: Nutrient intake appropriate for maintaining nutritional needs  Outcome: Not Progressing     Problem: Skin  Goal: Prevent/minimize sheer/friction injuries  Outcome: Not Progressing   The patient's goals for the shift include      The clinical goals for the shift include feel stronger    Over the shift, the patient did not make progress toward the following goals. Barriers to progression include . Recommendations to address these barriers include .    Problem: General Stroke  Goal: Establish a mutual long term goal with patient by discharge  Outcome: Not  Progressing  Goal: Demonstrate improvement in neurological exam throughout the shift  Outcome: Not Progressing  Goal: Maintain BP within ordered limits throughout shift  Outcome: Not Progressing  Goal: Participate in treatment (ie., meds, therapy) throughout shift  Outcome: Not Progressing  Goal: No symptoms of aspiration throughout shift  Outcome: Not Progressing  Goal: No symptoms of hemorrhage throughout shift  Outcome: Not Progressing  Goal: Tolerate enteral feeding throughout shift  Outcome: Not Progressing  Goal: Decreased nausea/vomiting throughout shift  Outcome: Not Progressing  Goal: Controlled blood glucose throughout shift  Outcome: Not Progressing  Goal: Out of bed three times today  Outcome: Not Progressing     Problem: Pain - Adult  Goal: Verbalizes/displays adequate comfort level or baseline comfort level  Outcome: Not Progressing     Problem: Safety - Adult  Goal: Free from fall injury  Outcome: Not Progressing     Problem: Discharge Planning  Goal: Discharge to home or other facility with appropriate resources  Outcome: Not Progressing     Problem: Chronic Conditions and Co-morbidities  Goal: Patient's chronic conditions and co-morbidity symptoms are monitored and maintained or improved  Outcome: Not Progressing     Problem: Nutrition  Goal: Nutrient intake appropriate for maintaining nutritional needs  Outcome: Not Progressing     Problem: Skin  Goal: Prevent/minimize sheer/friction injuries  Outcome: Not Progressing

## 2025-08-08 NOTE — CONSULTS
"Nutrition Initial Assessment:   Nutrition Assessment    Reason for Assessment: Admission nursing screening    Patient is a 89 y.o. male presenting with complaints of weakness.   Medical History[1]      Patient was assessed virtually.   Nutrition History:  Food and Nutrient History: Pt documented as [place], and disoriented to person, situation and time; not appropriate for phone interview. Per admission nutrition screen, pt reported 2-13 lb weight loss and eating poorly because of decreased appetite. Per RN, pt ate a little bit at lunch today. Per flowsheets, pt with 25% intake at 1 documented meal. continue Ensure Plus High Protein TID and Magic Cup TID to aid in meeting nutrition needs.       Anthropometrics:  Height: 180.3 cm (5' 11\")   Weight: 60.8 kg (134 lb)   BMI (Calculated): 18.7  IBW/kg (Dietitian Calculated): 78.2 kg  Percent of IBW: 78 %                      Weight History:   Wt Readings from Last 10 Encounters:   08/07/25 60.8 kg (134 lb)   07/31/25 60.8 kg (134 lb)   06/30/25 66.7 kg (147 lb)   01/31/25 66.7 kg (147 lb)   10/03/24 65.3 kg (144 lb)   04/25/24 73.9 kg (163 lb)   02/12/24 73.9 kg (163 lb)   02/02/24 72.1 kg (159 lb)   12/18/23 73.5 kg (162 lb)   05/17/23 73.9 kg (163 lb)       Weight Change %:  Weight History / % Weight Change: Per chart review, pt with 8.8% weight loss in <3 months. However, suspect most weights in chart are stated so unable to fully assess timeline for weight loss.    Nutrition Focused Physical Exam Findings:    Subcutaneous Fat Loss:   Defer Subcutaneous Fat Loss Assessment: Defer all  Defer All Reason: remote assessment  Muscle Wasting:  Defer Muscle Wasting Assessment: Defer all  Defer All Reason: remote assessment  Edema:  Edema: none  Physical Findings:  Skin: Negative    Nutrition Significant Labs:  BMP Trend:   Results from last 7 days   Lab Units 08/08/25  0610 08/07/25  1147   GLUCOSE mg/dL 82 113*   CALCIUM mg/dL 8.6 9.0   SODIUM mmol/L 131* 130*   POTASSIUM " mmol/L 3.7 3.1*   CO2 mmol/L 30 33*   CHLORIDE mmol/L 96* 93*   BUN mg/dL 17 17   CREATININE mg/dL 0.54 0.63    , A1C:  Lab Results   Component Value Date    HGBA1C 5.2 08/08/2025   , Vit D:   Lab Results   Component Value Date    VITD25 25 (L) 12/11/2023        Nutrition Specific Medications:  Scheduled medications  Scheduled Medications[2]  Continuous medications  Continuous Medications[3]  PRN medications  PRN Medications[4]      I/O:    ;      Dietary Orders (From admission, onward)       Start     Ordered    08/07/25 2008  May Participate in Room Service With Assistance  ( ROOM SERVICE MAY PARTICIPATE WITH ASSISTANCE)  Once        Question:  .  Answer:  Yes    08/07/25 2007 08/07/25 1739  Oral nutritional supplements  Until discontinued        Question Answer Comment   Deliver with All meals    Select supplement: Magic Cup        08/07/25 1738 08/07/25 1739  Oral nutritional supplements  Until discontinued        Question Answer Comment   Deliver with All meals    Select supplement: Ensure Plus High Protein        08/07/25 1738 08/07/25 1738  Adult diet Regular  Diet effective now        Question:  Diet type  Answer:  Regular    08/07/25 1737                     Estimated Needs:   Total Energy Estimated Needs in 24 hours (kCal): 2128 kCal  Method for Estimating Needs: 35 kcal/kg ABW (60.8 kg)  Total Protein Estimated Needs in 24 Hours (g): 73 g  Method for Estimating 24 Hour Protein Needs: 1.2+ g/kg ABW (60.8 kg)  Total Fluid Estimated Needs in 24 Hours (mL): 1824 mL  Method for Estimating 24 Hour Fluid Needs: 30 ml/kg ABW (60.8 kg)        Nutrition Diagnosis   Malnutrition Diagnosis  Patient has Malnutrition Diagnosis:  (Pt at risk with weight loss, poor intakes, and low BMI for age)    Nutrition Diagnosis  Patient has Nutrition Diagnosis: Yes  Diagnosis Status (1): New  Nutrition Diagnosis 1: Inadequate oral intake  Related to (1): decreased ability to consume sufficient energy  As Evidenced by  (1): po intakes <50% of meals, weight loss       Nutrition Interventions/Recommendations   Nutrition prescription for oral nutrition    Nutrition Recommendations:  Individualized Nutrition Prescription Provided for : Continue regular diet as ordered. Provide Ensure Plus High Protein TID and Magic cup TID.    Nutrition Interventions/Goals:   Meals and Snacks: General healthful diet  Goal: Pt to tolerate 3 meals/day  Medical Food Supplement: Commercial beverage medical food supplement therapy  Goal: Ensure Plus High Protein to provide 350 kcal, 20 g protein each. Magic Cup to provide 290 kcal, 9 g protein each.  Coordination of Care with Providers: Nursing      Education Documentation  No documentation found.            Nutrition Monitoring and Evaluation   Intake / Amount of food: Consumes at least 50% or more of meals/snacks/supplements    Body Weight: Body weight - Maintain stable weight, Body weight - Promote weight restoration              Goal Status: New goal(s) identified    Time Spent (min): 60 minutes            [1]   Past Medical History:  Diagnosis Date    A-fib (Multi)     Anxiety     Aortic stenosis 09/10/2024    Cataract     Glaucoma     HL (hearing loss)    [2] apixaban, 2.5 mg, oral, BID  aspirin, 81 mg, oral, Daily   Or  aspirin, 81 mg, oral, Daily   Or  aspirin, 81 mg, nasogastric tube, Daily   Or  aspirin, 300 mg, rectal, Daily  atorvastatin, 40 mg, oral, Nightly  timolol, 1 drop, Both Eyes, BID     [3]    [4] PRN medications: acetaminophen **OR** acetaminophen, bisacodyl, ondansetron **OR** ondansetron, oxygen

## 2025-08-09 LAB
GLUCOSE BLD MANUAL STRIP-MCNC: 116 MG/DL (ref 74–99)
GLUCOSE BLD MANUAL STRIP-MCNC: 129 MG/DL (ref 74–99)
GLUCOSE BLD MANUAL STRIP-MCNC: 149 MG/DL (ref 74–99)
GLUCOSE BLD MANUAL STRIP-MCNC: 162 MG/DL (ref 74–99)
GLUCOSE BLD MANUAL STRIP-MCNC: 170 MG/DL (ref 74–99)
GLUCOSE BLD MANUAL STRIP-MCNC: 66 MG/DL (ref 74–99)

## 2025-08-09 PROCEDURE — 82947 ASSAY GLUCOSE BLOOD QUANT: CPT

## 2025-08-09 PROCEDURE — 2500000001 HC RX 250 WO HCPCS SELF ADMINISTERED DRUGS (ALT 637 FOR MEDICARE OP): Performed by: STUDENT IN AN ORGANIZED HEALTH CARE EDUCATION/TRAINING PROGRAM

## 2025-08-09 PROCEDURE — G0378 HOSPITAL OBSERVATION PER HR: HCPCS

## 2025-08-09 PROCEDURE — 2500000001 HC RX 250 WO HCPCS SELF ADMINISTERED DRUGS (ALT 637 FOR MEDICARE OP): Performed by: NURSE PRACTITIONER

## 2025-08-09 PROCEDURE — 99232 SBSQ HOSP IP/OBS MODERATE 35: CPT | Performed by: NURSE PRACTITIONER

## 2025-08-09 RX ADMIN — TIMOLOL MALEATE 1 DROP: 2.5 SOLUTION/ DROPS OPHTHALMIC at 20:15

## 2025-08-09 RX ADMIN — ACETAMINOPHEN 650 MG: 325 TABLET ORAL at 17:59

## 2025-08-09 RX ADMIN — TIMOLOL MALEATE 1 DROP: 2.5 SOLUTION/ DROPS OPHTHALMIC at 09:51

## 2025-08-09 RX ADMIN — ATORVASTATIN CALCIUM 40 MG: 40 TABLET, FILM COATED ORAL at 20:14

## 2025-08-09 RX ADMIN — APIXABAN 2.5 MG: 2.5 TABLET, FILM COATED ORAL at 09:51

## 2025-08-09 RX ADMIN — APIXABAN 2.5 MG: 2.5 TABLET, FILM COATED ORAL at 20:15

## 2025-08-09 ASSESSMENT — COGNITIVE AND FUNCTIONAL STATUS - GENERAL
EATING MEALS: TOTAL
STANDING UP FROM CHAIR USING ARMS: TOTAL
PERSONAL GROOMING: TOTAL
DRESSING REGULAR LOWER BODY CLOTHING: TOTAL
MOBILITY SCORE: 8
TOILETING: TOTAL
HELP NEEDED FOR BATHING: TOTAL
DRESSING REGULAR UPPER BODY CLOTHING: TOTAL
MOVING FROM LYING ON BACK TO SITTING ON SIDE OF FLAT BED WITH BEDRAILS: A LOT
DAILY ACTIVITIY SCORE: 6
CLIMB 3 TO 5 STEPS WITH RAILING: TOTAL
MOVING TO AND FROM BED TO CHAIR: TOTAL
TURNING FROM BACK TO SIDE WHILE IN FLAT BAD: A LOT
WALKING IN HOSPITAL ROOM: TOTAL

## 2025-08-09 ASSESSMENT — PAIN SCALES - GENERAL
PAINLEVEL_OUTOF10: 0 - NO PAIN
PAINLEVEL_OUTOF10: 0 - NO PAIN
PAINLEVEL_OUTOF10: 3
PAINLEVEL_OUTOF10: 0 - NO PAIN

## 2025-08-09 ASSESSMENT — PAIN - FUNCTIONAL ASSESSMENT
PAIN_FUNCTIONAL_ASSESSMENT: 0-10

## 2025-08-09 ASSESSMENT — PAIN DESCRIPTION - LOCATION: LOCATION: GENERALIZED

## 2025-08-09 ASSESSMENT — PAIN DESCRIPTION - DESCRIPTORS: DESCRIPTORS: DISCOMFORT

## 2025-08-09 NOTE — ASSESSMENT & PLAN NOTE
Right-sided weakness  Rule out TIA/CVA  Noted right upper and lower extremity weakness and poor coordination  Monitor on telemetry  Eliquis resumed, stop ASA, continue statin  lipid panel in AM  MRI/MRA showed possible acute vs early subacute infarct in the right parietal lobe  US carotid with <50% stenosis B/L  Echocardiogram showed normal EF  neurostroke assessment  consult neurology, appreciate recommendations-reviewed MRI, unlikely CVA    Hypertension  Not on any antihypertensives  Monitor blood pressure    Hyperlipidemia  Statin    Glaucoma  Resume home eyedrops    History of aortic valve stenosis status post TAVR  On Eliquis    Plan  Monitor on telemetry  Stroke workup  PT/OT  DVT prophylaxis  Fall precautions  Patient is medically stable. Discharge to SNF when arrangements can be made    CODE STATUS: Discussed with the patient's daughter and niece, patient is a full code

## 2025-08-09 NOTE — PROGRESS NOTES
08/09/25 1258   Discharge Planning   Expected Discharge Disposition SNF     Daughter Tremayne now agreeable to SNF   List sent to both Tremayne and lei to review, TCC also spoke with Regina to update     Precert needed prior to discharge   ** do not discharge without speaking to care coordination**

## 2025-08-09 NOTE — CARE PLAN
The patient's goals for the shift include  Maintain comfort level and assist with feeding    The clinical goals for the shift include Maintain safety.

## 2025-08-09 NOTE — NURSING NOTE
BSSR given and patient is awake but very [place] even with hearing aides. Please don't put a brief on this patient. Call light and possessions within reach.

## 2025-08-09 NOTE — CARE PLAN
The patient's goals for the shift include      The clinical goals for the shift include Safety    Over the shift, the patient did not make progress toward the following goals. Barriers to progression include . Recommendations to address these barriers include   Problem: General Stroke  Goal: Establish a mutual long term goal with patient by discharge  Outcome: Progressing  Goal: Demonstrate improvement in neurological exam throughout the shift  Outcome: Progressing  Goal: Maintain BP within ordered limits throughout shift  Outcome: Progressing  Goal: Participate in treatment (ie., meds, therapy) throughout shift  Outcome: Progressing  Goal: No symptoms of aspiration throughout shift  Outcome: Progressing  Goal: No symptoms of hemorrhage throughout shift  Outcome: Progressing  Goal: Tolerate enteral feeding throughout shift  Outcome: Progressing  Goal: Decreased nausea/vomiting throughout shift  Outcome: Progressing  Goal: Controlled blood glucose throughout shift  Outcome: Progressing  Goal: Out of bed three times today  Outcome: Progressing     Problem: ICU Stroke  Goal: Maintain ICP within ordered limits throughout shift  Outcome: Progressing  Goal: Tolerate EVD clamping trial throughout shift  Outcome: Progressing  Goal: Tolerate ventilator weaning trial during shift  Outcome: Progressing  Goal: Maintain patent airway throughout shift  Outcome: Progressing  Goal: Achieve/maintain targeted sodium level throughout shift  Outcome: Progressing     Problem: Pain - Adult  Goal: Verbalizes/displays adequate comfort level or baseline comfort level  Outcome: Progressing     Problem: Safety - Adult  Goal: Free from fall injury  Outcome: Progressing     Problem: Discharge Planning  Goal: Discharge to home or other facility with appropriate resources  Outcome: Progressing     Problem: Chronic Conditions and Co-morbidities  Goal: Patient's chronic conditions and co-morbidity symptoms are monitored and maintained or  improved  Outcome: Progressing     Problem: Nutrition  Goal: Nutrient intake appropriate for maintaining nutritional needs  Outcome: Progressing     Problem: Skin  Goal: Decreased wound size/increased tissue granulation at next dressing change  Outcome: Progressing  Goal: Participates in plan/prevention/treatment measures  Outcome: Progressing  Goal: Prevent/manage excess moisture  Outcome: Progressing  Goal: Prevent/minimize sheer/friction injuries  Outcome: Progressing  Goal: Promote/optimize nutrition  Outcome: Progressing  Goal: Promote skin healing  Outcome: Progressing   .

## 2025-08-09 NOTE — CARE PLAN
The patient's goals for the shift include      The clinical goals for the shift include Safety    Over the shift, the patient did not make progress toward the following goals. Barriers to progression include   Problem: Skin  Goal: Decreased wound size/increased tissue granulation at next dressing change  Outcome: Progressing  Goal: Participates in plan/prevention/treatment measures  Outcome: Progressing  Goal: Prevent/manage excess moisture  Outcome: Progressing  Goal: Prevent/minimize sheer/friction injuries  Outcome: Progressing  Goal: Promote/optimize nutrition  Outcome: Progressing  Goal: Promote skin healing  8/8/2025 2239 by Soraya Her RN  Flowsheets (Taken 8/8/2025 2239)  Promote skin healing:   Assess skin/pad under line(s)/device(s)   Protective dressings over bony prominences   Turn/reposition every 2 hours/use positioning/transfer devices  8/8/2025 2238 by Soraya Her RN  Outcome: Progressing   . Recommendations to address these barriers include .

## 2025-08-09 NOTE — PROGRESS NOTES
Kolton Acosta is a 89 y.o. male on day 0 of admission presenting with Weakness.      Subjective   Patient seen and examined. Awake/alert/oriented, forgetful. Denies chest pain, shortness of breath, fevers, chills, nausea, or vomiting. Had increased weakness, unable to stand last night. Discharge held, STAT CT head unremarkable. Discussed with neurology and daughter as well. Patient would benefit from SNF, family and patient agreeable. Care coordination notified.        Objective     Last Recorded Vitals  /83 (BP Location: Right arm, Patient Position: Lying)   Pulse 84   Temp 36.5 °C (97.7 °F) (Oral)   Resp 18   Wt 60.8 kg (134 lb)   SpO2 97%   Intake/Output last 3 Shifts:    Intake/Output Summary (Last 24 hours) at 8/9/2025 1018  Last data filed at 8/9/2025 1013  Gross per 24 hour   Intake 540 ml   Output --   Net 540 ml       Admission Weight  Weight: 60.8 kg (134 lb) (08/07/25 1141)    Daily Weight  08/07/25 : 60.8 kg (134 lb)    Image Results  CT head wo IV contrast  Narrative: Interpreted By:  Joseph Reyes,   STUDY:  CT HEAD WO IV CONTRAST;  8/8/2025 6:05 pm      INDICATION:  Signs/Symptoms:Right sided weakness.          COMPARISON:  CT head dated 08/07/2025.      ACCESSION NUMBER(S):  [id number]      ORDERING CLINICIAN:  OLIVIER HOYOS      TECHNIQUE:  Noncontrast axial CT scan of head was performed. Angled reformats in  brain and bone windows were generated. The images were reviewed in  bone, brain, blood and soft tissue windows.      FINDINGS:  No hyperdense intracranial hemorrhage is present. There is no mass  effect or midline shift identified.      Gray-white differentiation is intact, without evidence of CT apparent  transcortical infarct. Mild-to-moderate volume of the attenuation  changes are present in the periventricular and subcortical white  matter of bilateral cerebral hemispheres, likely representing sequela  of microvascular disease.      No abnormal ventricular dilatation is  present. Basal cisterns are  patent. No extra-axial collection is identified.      Scalp soft tissues do not demonstrate any acute abnormality. No acute  calvarial abnormality is present.      Mastoid air cells and middle ear cavities are clear. Visualized  paranasal sinuses are unremarkable in appearance.      Impression: No evidence of new hemorrhage, CT apparent transcortical infarct, or  other acute intracranial abnormality.      MACRO:  None      Signed by: Joseph Reyes 8/8/2025 8:08 PM  Dictation workstation:   [id number]  Carotid [place] [address]             Phone [phone]       Vascular Lab Report     [facility] US CAROTID ARTERY DUPLEX BILATERAL    Patient Name:      SANDRA Samuel Physician: [id number] Sho Benitez MD  Study Date:        8/8/2025            Ordering Provider: [id number] OLIVIER HOYOS  MRN/PID:           [id number]            Fellow:  Accession#:        [id number]        Technologist:      Soraya Crooks RVKATHI  Date of Birth/Age: 8/3/1926 / 89 years Technologist 2:  Gender:            M                   Encounter#:        [id number]  Admission Status:  Outpatient          Location           [facility]                                         Performed:       Diagnosis/ICD: Other specified symptoms and signs involving the circulatory and                 respiratory systems-R09.89  CPT Codes:     94405 Cerebrovascular Carotid Duplex scan complete       CONCLUSIONS:  Right Carotid: Findings are consistent with less than 50% stenosis of the right proximal internal carotid artery. Right external carotid artery appears patent with no evidence of stenosis. The right vertebral artery is patent with antegrade flow. No evidence of hemodynamically significant stenosis in the right  subclavian artery.  Left Carotid: Findings are consistent with less than 50% stenosis of the left proximal internal carotid artery. Left external carotid artery appears patent with no evidence of stenosis. The left vertebral artery is patent with antegrade flow. No evidence of hemodynamically significant stenosis in the left subclavian artery.     Imaging & Doppler Findings:  Right Plaque Morph: The proximal right internal carotid artery demonstrates heterogenous plaque. The proximal right external carotid artery demonstrates heterogenous plaque. The distal right common carotid artery demonstrates heterogenous plaque.  Left Plaque Morph: The proximal left internal carotid artery demonstrates heterogenous and calcified plaque. The proximal left external carotid artery demonstrates heterogenous plaque. The mid left common carotid artery demonstrates heterogenous plaque. The distal left common carotid artery demonstrates heterogenous and calcified plaque.      Right                      Left    PSV     EDV                PSV     EDV  37 cm/s 0 cm/s    CCA P    39 cm/s 4 cm/s  39 cm/s 5 cm/s    CCA D    39 cm/s 7 cm/s  25 cm/s 4 cm/s    ICA P    26 cm/s 7 cm/s  50 cm/s 13 cm/s   ICA M    45 cm/s 9 cm/s  45 cm/s 11 cm/s   ICA D    41 cm/s 11 cm/s  45 cm/s            ECA     42 cm/s  22 cm/s 5 cm/s  Vertebral  27 cm/s 7 cm/s  57 cm/s         Subclavian 77 cm/s                     Right Left  ICA/CCA Ratio  0.6  0.7          [id number] Sho Benitez MD  Electronically signed by [id number] Sho Benitez MD on 8/8/2025 at 10:46:07 AM       ** Final **  Transthoracic Echo Complete             [place] [id number]             Phone [phone]    TRANSTHORACIC ECHOCARDIOGRAM REPORT    Patient Name:       SANDRA Samuel Physician:    [id number] Tyrel Hudson MD  Study Date:         8/8/2025             Ordering  Provider:    [id number] OLIVIER WAGNER HOYOS  MRN/PID:            [id number]             Fellow:  Accession#:         [id number]         Nurse:  Date of Birth/Age:  8/3/1926 / 89 years  Sonographer:          Mikel Delgadillo RDCS  Gender Assigned at  M                    Additional Staff:  Birth:  Height:             180.34 cm            Admit Date:  Weight:             60.78 kg             Admission Status:     Inpatient -                                                                 Routine  BSA / BMI:          1.78 m2 / 18.69      Department Location:  [facility]                      kg/m2  Blood Pressure: 129 /68 mmHg    Study Type:    TRANSTHORACIC ECHO (TTE) COMPLETE  Diagnosis/ICD: Transient cerebral ischemic attack, unspecified (NOT on                 LCD)-G45.9  Indication:    Transischemic Attack  CPT Codes:     Echo Complete w Full Doppler-89023    Patient History:  Pertinent History: A-Fib, HTN and Syncope.    Study Detail: The following Echo studies were performed: 2D, M-Mode, Doppler and                color flow. Technically challenging study due to body habitus and                patient lying in supine position.       PHYSICIAN INTERPRETATION:  Left Ventricle: Left ventricular ejection fraction is normal by visual estimate at 60-65%. There are no regional wall motion abnormalities. The left ventricular cavity size is normal. There is mild increased septal and normal posterior left ventricular wall thickness. There is left ventricular concentric remodeling. Spectral Doppler shows a Grade I (impaired relaxation pattern) of left ventricular diastolic filling with normal left atrial filling pressure.  Left Atrium: The left atrial size is moderately dilated.  Right Ventricle: The right ventricle is mildly enlarged. There is normal right ventricular global systolic function.  Right Atrium: The right atrial size is mildly dilated.  Aortic Valve: The  aortic valve was not well visualized. There is moderate to severe aortic valve cusp calcification. There is evidence of moderate to severe aortic valve stenosis.  There is no evidence of aortic valve regurgitation.  Mitral Valve: The mitral valve is mildly thickened. There is mild mitral annular calcification. There is trace mitral valve regurgitation. The E Vmax is 0.86 m/s.  Tricuspid Valve: The tricuspid valve is structurally normal. No evidence of tricuspid regurgitation.  Pulmonic Valve: The pulmonic valve is not well visualized. There is no indication of pulmonic valve regurgitation.  Pericardium: No pericardial effusion noted.  Aorta: The aortic root was not well visualized.  Systemic Veins: The inferior vena cava was not well visualized, IVC inspiratory collapse is not well visualized.       CONCLUSIONS:   1. Left ventricular ejection fraction is normal by visual estimate at 60-65%.   2. Spectral Doppler shows a Grade I (impaired relaxation pattern) of left ventricular diastolic filling with normal left atrial filling pressure.   3. There is normal right ventricular global systolic function.   4. Mildly enlarged right ventricle.   5. The left atrial size is moderately dilated.   6. There is moderate to severe aortic valve cusp calcification.    QUANTITATIVE DATA SUMMARY:     2D MEASUREMENTS:           Normal Ranges:  LAs:             5.09 cm   (2.7-4.0cm)  IVSd:            1.23 cm   (0.6-1.1cm)  LVPWd:           0.96 cm   (0.6-1.1cm)  LVIDd:           4.07 cm   (3.9-5.9cm)  LVIDs:           2.29 cm  LV Mass Index:   83.7 g/m2  LV % FS          43.7 %       LEFT ATRIUM:                Normal Ranges:  LA Vol A4C:       90.5 ml   (22+/-6mL/m2)  LA Vol Index A4C: 50.9ml/m2  LA Vol A4C:       80.0 ml       RIGHT ATRIUM:          Normal Ranges:  RA Area A4C:  23.0 cm2       LV SYSTOLIC FUNCTION:                       Normal Ranges:  EF-Visual:      63 %  LV EF Reported: 63 %       LV DIASTOLIC FUNCTION:              Normal Ranges:  MV Peak E:             0.86 m/s    (0.7-1.2 m/s)  MV Peak A:             0.34 m/s    (0.42-0.7 m/s)  E/A Ratio:             2.53        (1.0-2.2)  MV lateral e'          0.06 m/s  MV A Dur:              254.04 msec       MITRAL VALVE:          Normal Ranges:  MV DT:        173 msec (150-240msec)       AORTIC VALVE:           Normal Ranges:  AoV Vmax:     1.59 m/s  (<=1.7m/s)  AoV Peak PG:  10.1 mmHg (<20mmHg)  LVOT Max Edgar: 0.93 m/s  (<=1.1m/s)  LVOT VTI:     14.93 cm       RIGHT VENTRICLE:  RV Basal 4.57 cm  RV Major 7.3 cm  TAPSE:   8.3 mm  RV s'    0.07 m/s       TRICUSPID VALVE/RVSP:        Normal Ranges:  Est. RA Pressure:     3 mmHg       [id number] Tyrel Hudson MD  Electronically signed on 8/8/2025 at 9:54:29 AM       ** Final **  MR brain wo IV contrast, MR angio neck wo IV contrast, MR angio head wo IV contrast  Narrative: Interpreted By:  Joseph Reyes,   STUDY:  MR BRAIN WO IV CONTRAST; MR ANGIO HEAD WO IV CONTRAST; MR ANGIO NECK  WO IV CONTRAST;  8/7/2025 10:53 pm; 8/7/2025 10:55 pm; 8/7/2025 10:56  pm      INDICATION:  Signs/Symptoms:TIA.      COMPARISON:  Same day noncontrast CT head      ACCESSION NUMBER(S):  [id number]; [id number]; [id number]      ORDERING CLINICIAN:  OLIVIER HOYOS      TECHNIQUE:  Axial T2, FLAIR, DWI, gradient echo T2 and  sagittal and coronal T1  weighted images of brain were acquired.      Time-of-flight MRA of the head  and neck was performed. The images  were reviewed as source images and maximum intensity projections.      FINDINGS:  Brain:      There is a small 2-3 mm focus of hyperintense DWI signal are present  in the right occipital lobe (series 502, image 16) too small to  definitely characterized, although tiny focus of acute infarction is  not excluded.      No geographic area of diffusion restriction is identified.      There is no evidence of recent intracranial hemorrhage. No mass  effect or midline shift is present.      Moderate brain  parenchymal volume loss is present without abnormal  ventricular dilatation. Basal cisterns are patent. No extra-axial  collections are present.      Low volume of FLAIR and T2 signal changes are present in the  periventricular and subcortical white matter of bilateral cerebral  hemispheres, likely representing sequela of microvascular disease.      No abnormal fluid signal is present in the paranasal sinuses or  mastoid air cells.      MRA of head:      MRA source images are significantly degraded by motion.      Within limitations of the study, there is symmetric flow enhancement  in the intracranial carotid arteries bilaterally without evidence of  occlusion, although areas of mild-to-moderate stenosis are not  excluded.      Visualized A1 and proximal A2 segments of the anterior cerebral  arteries do not demonstrate any evidence of occlusion, although more  distal arteries are poorly visualized due to motion.      M1 and M2 segments of the middle cerebral arteries do not demonstrate  any high-grade stenosis or occlusion bilaterally, although more  distal M3 segments are suboptimally evaluated.      Visualized vertebral arteries and basilar artery do not demonstrate  any evidence of occlusion.      Visualized proximal posterior cerebral arteries demonstrate symmetric  flow enhancement without evidence of occlusion.      MRA of neck:      The source images are significantly degraded by artifact.      Within limitations of the study, no occlusion is identified in the  common carotid artery, although mild stenosis is not excluded  distally.      Extracranial internal carotid arteries do not demonstrate any  evidence of occlusion bilaterally, although mild stenosis is not  excluded distally.      Extracranial vertebral arteries are symmetric in appearance without  evidence of occlusion.      Impression: MRI Brain:  1.  A 2-3 mm focus of hyperintense DWI signal is present in the right  parietal lobe, too small to  definitely characterize, although tiny  focus of acute or early subacute infarct is not entirely excluded. No  large vascular territory infarction is identified.  2. Mild volume of FLAIR and T2 signal changes are present in the  periventricular and subcortical white matter of bilateral cerebral  hemispheres, nonspecific findings favored to represent sequela of  microvascular disease.      MRA:  1.  MRA source images are significantly degraded by motion in the  head and neck.  2. Within limitations of the study, no definite evidence of large  vessel occlusion is identified in the proximal intracranial  circulation. No high-grade stenosis is identified in the large  arteries of the neck, although mild narrowing is not excluded due to  motion.      MACRO:  None      Signed by: Joseph Reyes 8/8/2025 12:19 AM  Dictation workstation:   [id number]      Physical Exam  Vitals reviewed.   Constitutional:       Appearance: Normal appearance.   HENT:      Head: Normocephalic and atraumatic.     Eyes:      Extraocular Movements: Extraocular movements intact.      Conjunctiva/sclera: Conjunctivae normal.       Cardiovascular:      Rate and Rhythm: Normal rate and regular rhythm.   Pulmonary:      Effort: Pulmonary effort is normal.      Breath sounds: Normal breath sounds. No wheezing, rhonchi or rales.   Abdominal:      General: Bowel sounds are normal.      Palpations: Abdomen is soft.      Tenderness: There is no abdominal tenderness.     Musculoskeletal:      Cervical back: Normal range of motion and neck supple.     Skin:     General: Skin is warm and dry.     Neurological:      General: No focal deficit present.      Mental Status: He is alert and oriented to person, place, and time.         Relevant Results  Lab Results   Component Value Date    GLUCOSE 82 08/08/2025    CALCIUM 8.6 08/08/2025     (L) 08/08/2025    K 3.7 08/08/2025    CO2 30 08/08/2025    CL 96 (L) 08/08/2025    BUN 17 08/08/2025     CREATININE 0.54 08/08/2025      Lab Results   Component Value Date    WBC 5.8 08/08/2025    HGB 14.2 08/08/2025    HCT 40.7 (L) 08/08/2025    MCV 90 08/08/2025     08/08/2025    Carotid duplex bilateral  Result Date: 8/8/2025  CONCLUSIONS:     Right Carotid: Findings are consistent with less than 50% stenosis of the right proximal internal carotid artery. Right external carotid artery appears patent with no evidence of stenosis. The right vertebral artery is patent with antegrade flow. No evidence of hemodynamically significant stenosis in the right subclavian artery.     Left Carotid: Findings are consistent with less than 50% stenosis of the left proximal internal carotid artery. Left external carotid artery appears patent with no evidence of stenosis. The left vertebral artery is patent with antegrade flow. No evidence of hemodynamically significant stenosis in the left subclavian artery.      Transthoracic Echo Complete  Result Date: 8/8/2025  CONCLUSIONS:  1. Left ventricular ejection fraction is normal by visual estimate at 60-65%.  2. Spectral Doppler shows a Grade I (impaired relaxation pattern) of left ventricular diastolic filling with normal left atrial filling pressure.  3. There is normal right ventricular global systolic function.  4. Mildly enlarged right ventricle.  5. The left atrial size is moderately dilated.  6. There is moderate to severe aortic valve cusp calcification. Tyrel Hudson MD Electronically signed on 8/8/2025 at 9:54:29 AM  ** Final **     MR brain wo IV contrast  Result Date: 8/8/2025  MRI Brain: 1.  A 2-3 mm focus of hyperintense DWI signal is present in the right parietal lobe, too small to definitely characterize, although tiny focus of acute or early subacute infarct is not entirely excluded. No large vascular territory infarction is identified. 2. Mild volume of FLAIR and T2 signal changes are present in the periventricular and subcortical white matter of bilateral  cerebral hemispheres, nonspecific findings favored to represent sequela of microvascular disease.   MRA: 1.  MRA source images are significantly degraded by motion in the head and neck. 2. Within limitations of the study, no definite evidence of large vessel occlusion is identified in the proximal intracranial circulation. No high-grade stenosis is identified in the large arteries of the neck, although mild narrowing is not excluded due to motion.   MACRO: None   Signed by: Joseph Reyes 8/8/2025 12:19 AM Dictation workstation:   [id number]    MR angio neck wo IV contrast  Result Date: 8/8/2025  MRI Brain: 1.  A 2-3 mm focus of hyperintense DWI signal is present in the right parietal lobe, too small to definitely characterize, although tiny focus of acute or early subacute infarct is not entirely excluded. No large vascular territory infarction is identified. 2. Mild volume of FLAIR and T2 signal changes are present in the periventricular and subcortical white matter of bilateral cerebral hemispheres, nonspecific findings favored to represent sequela of microvascular disease.   MRA: 1.  MRA source images are significantly degraded by motion in the head and neck. 2. Within limitations of the study, no definite evidence of large vessel occlusion is identified in the proximal intracranial circulation. No high-grade stenosis is identified in the large arteries of the neck, although mild narrowing is not excluded due to motion.   MACRO: None   Signed by: Joseph Reyes 8/8/2025 12:19 AM Dictation workstation:   [id number]    MR angio head wo IV contrast  Result Date: 8/8/2025  MRI Brain: 1.  A 2-3 mm focus of hyperintense DWI signal is present in the right parietal lobe, too small to definitely characterize, although tiny focus of acute or early subacute infarct is not entirely excluded. No large vascular territory infarction is identified. 2. Mild volume of FLAIR and T2 signal changes are present in the  periventricular and subcortical white matter of bilateral cerebral hemispheres, nonspecific findings favored to represent sequela of microvascular disease.   MRA: 1.  MRA source images are significantly degraded by motion in the head and neck. 2. Within limitations of the study, no definite evidence of large vessel occlusion is identified in the proximal intracranial circulation. No high-grade stenosis is identified in the large arteries of the neck, although mild narrowing is not excluded due to motion.   MACRO: None   Signed by: Joseph Reyes 8/8/2025 12:19 AM Dictation workstation:   [id number]    CT head wo IV contrast  Result Date: 8/7/2025  NO ACUTE INTRACRANIAL PROCESS   MACRO: None   Signed by: Rj Tejeda 8/7/2025 1:31 PM Dictation workstation:   [id number]    XR chest 1 view  Result Date: 8/7/2025  1. Mild enlargement of the cardiac silhouette without acute cardiopulmonary process.       MACRO: None   Signed by: Jabier Whitfield 8/7/2025 12:20 PM Dictation workstation:   [id number]                          Assessment & Plan  Weakness    Right-sided weakness  Rule out TIA/CVA  Noted right upper and lower extremity weakness and poor coordination  Monitor on telemetry  Eliquis resumed, stop ASA, continue statin  lipid panel in AM  MRI/MRA showed possible acute vs early subacute infarct in the right parietal lobe  US carotid with <50% stenosis B/L  Echocardiogram showed normal EF  neurostroke assessment  consult neurology, appreciate recommendations-reviewed MRI, unlikely CVA    Hypertension  Not on any antihypertensives  Monitor blood pressure    Hyperlipidemia  Statin    Glaucoma  Resume home eyedrops    History of aortic valve stenosis status post TAVR  On Eliquis    Plan  Monitor on telemetry  Stroke workup  PT/OT  DVT prophylaxis  Fall precautions  Patient is medically stable. Discharge to SNF when arrangements can be made    CODE STATUS: Discussed with the patient's daughter and niece,  patient is a full code             Aure Mayers, APRN-CNP

## 2025-08-09 NOTE — NURSING NOTE
Assumed care of patient, patient BS was 66 15oz of orange juice given and will recheck BS. Call light and possession within reach

## 2025-08-10 VITALS
DIASTOLIC BLOOD PRESSURE: 56 MMHG | BODY MASS INDEX: 18.76 KG/M2 | HEIGHT: 71 IN | TEMPERATURE: 97.5 F | RESPIRATION RATE: 18 BRPM | SYSTOLIC BLOOD PRESSURE: 101 MMHG | HEART RATE: 75 BPM | WEIGHT: 134 LBS | OXYGEN SATURATION: 98 %

## 2025-08-10 LAB
ANION GAP SERPL CALCULATED.3IONS-SCNC: 8 MMOL/L (ref 10–20)
BUN SERPL-MCNC: 24 MG/DL (ref 6–23)
CALCIUM SERPL-MCNC: 9 MG/DL (ref 8.6–10.3)
CHLORIDE SERPL-SCNC: 99 MMOL/L (ref 98–107)
CO2 SERPL-SCNC: 29 MMOL/L (ref 21–32)
CREAT SERPL-MCNC: 0.57 MG/DL (ref 0.5–1.3)
EGFRCR SERPLBLD CKD-EPI 2021: 88 ML/MIN/1.73M*2
ERYTHROCYTE [DISTWIDTH] IN BLOOD BY AUTOMATED COUNT: 14.4 % (ref 11.5–14.5)
GLUCOSE BLD MANUAL STRIP-MCNC: 108 MG/DL (ref 74–99)
GLUCOSE BLD MANUAL STRIP-MCNC: 124 MG/DL (ref 74–99)
GLUCOSE BLD MANUAL STRIP-MCNC: 153 MG/DL (ref 74–99)
GLUCOSE BLD MANUAL STRIP-MCNC: 290 MG/DL (ref 74–99)
GLUCOSE SERPL-MCNC: 96 MG/DL (ref 74–99)
HCT VFR BLD AUTO: 43.3 % (ref 41–52)
HGB BLD-MCNC: 14.7 G/DL (ref 13.5–17.5)
MCH RBC QN AUTO: 31.2 PG (ref 26–34)
MCHC RBC AUTO-ENTMCNC: 33.9 G/DL (ref 32–36)
MCV RBC AUTO: 92 FL (ref 80–100)
NRBC BLD-RTO: 0 /100 WBCS (ref 0–0)
PLATELET # BLD AUTO: 158 X10*3/UL (ref 150–450)
POTASSIUM SERPL-SCNC: 3.2 MMOL/L (ref 3.5–5.3)
RBC # BLD AUTO: 4.71 X10*6/UL (ref 4.5–5.9)
SODIUM SERPL-SCNC: 133 MMOL/L (ref 136–145)
WBC # BLD AUTO: 5.8 X10*3/UL (ref 4.4–11.3)

## 2025-08-10 PROCEDURE — 82947 ASSAY GLUCOSE BLOOD QUANT: CPT

## 2025-08-10 PROCEDURE — G0378 HOSPITAL OBSERVATION PER HR: HCPCS

## 2025-08-10 PROCEDURE — 80048 BASIC METABOLIC PNL TOTAL CA: CPT | Performed by: NURSE PRACTITIONER

## 2025-08-10 PROCEDURE — 85027 COMPLETE CBC AUTOMATED: CPT | Performed by: NURSE PRACTITIONER

## 2025-08-10 PROCEDURE — 99232 SBSQ HOSP IP/OBS MODERATE 35: CPT | Performed by: NURSE PRACTITIONER

## 2025-08-10 PROCEDURE — 2500000001 HC RX 250 WO HCPCS SELF ADMINISTERED DRUGS (ALT 637 FOR MEDICARE OP): Performed by: STUDENT IN AN ORGANIZED HEALTH CARE EDUCATION/TRAINING PROGRAM

## 2025-08-10 PROCEDURE — 36415 COLL VENOUS BLD VENIPUNCTURE: CPT | Performed by: NURSE PRACTITIONER

## 2025-08-10 PROCEDURE — 2500000001 HC RX 250 WO HCPCS SELF ADMINISTERED DRUGS (ALT 637 FOR MEDICARE OP): Performed by: NURSE PRACTITIONER

## 2025-08-10 RX ORDER — HYDROXYZINE HYDROCHLORIDE 25 MG/1
25 TABLET, FILM COATED ORAL EVERY 4 HOURS PRN
Status: ACTIVE | OUTPATIENT
Start: 2025-08-10

## 2025-08-10 RX ORDER — ACETAMINOPHEN 500 MG
5 TABLET ORAL NIGHTLY
Status: DISPENSED | OUTPATIENT
Start: 2025-08-10

## 2025-08-10 RX ADMIN — TIMOLOL MALEATE 1 DROP: 2.5 SOLUTION/ DROPS OPHTHALMIC at 09:02

## 2025-08-10 RX ADMIN — ACETAMINOPHEN 650 MG: 325 TABLET ORAL at 21:34

## 2025-08-10 RX ADMIN — APIXABAN 2.5 MG: 2.5 TABLET, FILM COATED ORAL at 09:02

## 2025-08-10 RX ADMIN — TIMOLOL MALEATE 1 DROP: 2.5 SOLUTION/ DROPS OPHTHALMIC at 21:34

## 2025-08-10 RX ADMIN — ATORVASTATIN CALCIUM 40 MG: 40 TABLET, FILM COATED ORAL at 21:34

## 2025-08-10 RX ADMIN — Medication 5 MG: at 21:34

## 2025-08-10 RX ADMIN — APIXABAN 2.5 MG: 2.5 TABLET, FILM COATED ORAL at 21:34

## 2025-08-10 ASSESSMENT — COGNITIVE AND FUNCTIONAL STATUS - GENERAL
TOILETING: TOTAL
WALKING IN HOSPITAL ROOM: TOTAL
PERSONAL GROOMING: TOTAL
DRESSING REGULAR UPPER BODY CLOTHING: TOTAL
MOVING TO AND FROM BED TO CHAIR: TOTAL
DRESSING REGULAR LOWER BODY CLOTHING: TOTAL
EATING MEALS: TOTAL
HELP NEEDED FOR BATHING: TOTAL
TURNING FROM BACK TO SIDE WHILE IN FLAT BAD: A LOT
CLIMB 3 TO 5 STEPS WITH RAILING: TOTAL
STANDING UP FROM CHAIR USING ARMS: TOTAL
MOBILITY SCORE: 8
MOVING FROM LYING ON BACK TO SITTING ON SIDE OF FLAT BED WITH BEDRAILS: A LOT
DAILY ACTIVITIY SCORE: 6

## 2025-08-10 ASSESSMENT — PAIN SCALES - GENERAL
PAINLEVEL_OUTOF10: 3
PAINLEVEL_OUTOF10: 0 - NO PAIN
PAINLEVEL_OUTOF10: 0 - NO PAIN

## 2025-08-10 ASSESSMENT — PAIN DESCRIPTION - ORIENTATION: ORIENTATION: LOWER

## 2025-08-10 ASSESSMENT — PAIN DESCRIPTION - DESCRIPTORS: DESCRIPTORS: ACHING

## 2025-08-10 ASSESSMENT — PAIN - FUNCTIONAL ASSESSMENT
PAIN_FUNCTIONAL_ASSESSMENT: 0-10

## 2025-08-10 ASSESSMENT — PAIN DESCRIPTION - LOCATION: LOCATION: BACK

## 2025-08-10 NOTE — CARE PLAN
The patient's goals for the shift include      The clinical goals for the shift include Maintain comfort and safety    O

## 2025-08-10 NOTE — PROGRESS NOTES
Kolton Acosta is a 89 y.o. male on day 0 of admission presenting with Weakness.      Subjective   Patient seen and examined. Awake/alert/oriented, forgetful. Denies chest pain, shortness of breath, fevers, chills, nausea, or vomiting.      Objective     Last Recorded Vitals  /77 (BP Location: Right arm, Patient Position: Lying)   Pulse 87   Temp 36.2 °C (97.2 °F) (Axillary)   Resp 17   Wt 60.8 kg (134 lb)   SpO2 100%   Intake/Output last 3 Shifts:    Intake/Output Summary (Last 24 hours) at 8/10/2025 1142  Last data filed at 8/9/2025 1836  Gross per 24 hour   Intake 360 ml   Output --   Net 360 ml       Admission Weight  Weight: 60.8 kg (134 lb) (08/07/25 1141)    Daily Weight  08/07/25 : 60.8 kg (134 lb)    Image Results  CT head wo IV contrast  Narrative: Interpreted By:  Joseph Reyes,   STUDY:  CT HEAD WO IV CONTRAST;  8/8/2025 6:05 pm      INDICATION:  Signs/Symptoms:Right sided weakness.          COMPARISON:  CT head dated 08/07/2025.      ACCESSION NUMBER(S):  [id number]      ORDERING CLINICIAN:  OLIVIER HOYOS      TECHNIQUE:  Noncontrast axial CT scan of head was performed. Angled reformats in  brain and bone windows were generated. The images were reviewed in  bone, brain, blood and soft tissue windows.      FINDINGS:  No hyperdense intracranial hemorrhage is present. There is no mass  effect or midline shift identified.      Gray-white differentiation is intact, without evidence of CT apparent  transcortical infarct. Mild-to-moderate volume of the attenuation  changes are present in the periventricular and subcortical white  matter of bilateral cerebral hemispheres, likely representing sequela  of microvascular disease.      No abnormal ventricular dilatation is present. Basal cisterns are  patent. No extra-axial collection is identified.      Scalp soft tissues do not demonstrate any acute abnormality. No acute  calvarial abnormality is present.      Mastoid air cells and middle ear  cavities are clear. Visualized  paranasal sinuses are unremarkable in appearance.      Impression: No evidence of new hemorrhage, CT apparent transcortical infarct, or  other acute intracranial abnormality.      MACRO:  None      Signed by: Joseph Reyes 8/8/2025 8:08 PM  Dictation workstation:   [id number]  Carotid duplex [place] [address]             Phone [phone]       Vascular Lab Report     [facility]    Patient Name:      SANDRA EISENBERG        Reading Physician: [id number] Sho Benitez MD  Study Date:        8/8/2025            Ordering Provider: [id number] OLIVIER HOYOS  MRN/PID:           [id number]            Fellow:  Accession#:        [id number]        Technologist:      Soraya Crooks RVKATHI  Date of Birth/Age: 8/3/1926 / 89 years Technologist 2:  Gender:            M                   Encounter#:        [id number]  Admission Status:  Outpatient          Location           [facility]                                         Performed:       Diagnosis/ICD: Other specified symptoms and signs involving the circulatory and                 respiratory systems-R09.89  CPT Codes:     40636 Cerebrovascular Carotid Duplex scan complete       CONCLUSIONS:  Right Carotid: Findings are consistent with less than 50% stenosis of the right proximal internal carotid artery. Right external carotid artery appears patent with no evidence of stenosis. The right vertebral artery is patent with antegrade flow. No evidence of hemodynamically significant stenosis in the right subclavian artery.  Left Carotid: Findings are consistent with less than 50% stenosis of the left proximal internal carotid artery. Left external carotid artery appears patent with no evidence of stenosis. The left vertebral  artery is patent with antegrade flow. No evidence of hemodynamically significant stenosis in the left subclavian artery.     Imaging & Doppler Findings:  Right Plaque Morph: The proximal right internal carotid artery demonstrates heterogenous plaque. The proximal right external carotid artery demonstrates heterogenous plaque. The distal right common carotid artery demonstrates heterogenous plaque.  Left Plaque Morph: The proximal left internal carotid artery demonstrates heterogenous and calcified plaque. The proximal left external carotid artery demonstrates heterogenous plaque. The mid left common carotid artery demonstrates heterogenous plaque. The distal left common carotid artery demonstrates heterogenous and calcified plaque.      Right                      Left    PSV     EDV                PSV     EDV  37 cm/s 0 cm/s    CCA P    39 cm/s 4 cm/s  39 cm/s 5 cm/s    CCA D    39 cm/s 7 cm/s  25 cm/s 4 cm/s    ICA P    26 cm/s 7 cm/s  50 cm/s 13 cm/s   ICA M    45 cm/s 9 cm/s  45 cm/s 11 cm/s   ICA D    41 cm/s 11 cm/s  45 cm/s            ECA     42 cm/s  22 cm/s 5 cm/s  Vertebral  27 cm/s 7 cm/s  57 cm/s         Subclavian 77 cm/s                     Right Left  ICA/CCA Ratio  0.6  0.7          [id number] Sho Benitez MD  Electronically signed by [id number] Sho Benitez MD on 8/8/2025 at 10:46:07 AM       ** Final **  Transthoracic Echo [place] [address]             Phone [phone]    TRANSTHORACIC ECHOCARDIOGRAM REPORT    Patient Name:       SANDRA Samuel Physician:    [id number] Tyrel Hudson MD  Study Date:         8/8/2025             Ordering Provider:    [id number] OLIVIER HOYOS  MRN/PID:            [id number]             Fellow:  Accession#:         [id number]         Nurse:  Date of Birth/Age:  8/3/1926 / 89  years  Sonographer:          Mikel Delgadillo [facility]  Gender Assigned at  M                    Additional Staff:  Birth:  Height:             180.34 cm            Admit Date:  Weight:             60.78 kg             Admission Status:     Inpatient -                                                                 Routine  BSA / BMI:          1.78 m2 / 18.69      Department Location:  [facility]                      kg/m2  Blood Pressure: 129 /68 mmHg    Study Type:    TRANSTHORACIC ECHO (TTE) COMPLETE  Diagnosis/ICD: Transient cerebral ischemic attack, unspecified (NOT on                 LCD)-G45.9  Indication:    Transischemic Attack  CPT Codes:     Echo Complete w Full Doppler-19529    Patient History:  Pertinent History: A-Fib, HTN and Syncope.    Study Detail: The following Echo studies were performed: 2D, M-Mode, Doppler and                color flow. Technically challenging study due to body habitus and                patient lying in supine position.       PHYSICIAN INTERPRETATION:  Left Ventricle: Left ventricular ejection fraction is normal by visual estimate at 60-65%. There are no regional wall motion abnormalities. The left ventricular cavity size is normal. There is mild increased septal and normal posterior left ventricular wall thickness. There is left ventricular concentric remodeling. Spectral Doppler shows a Grade I (impaired relaxation pattern) of left ventricular diastolic filling with normal left atrial filling pressure.  Left Atrium: The left atrial size is moderately dilated.  Right Ventricle: The right ventricle is mildly enlarged. There is normal right ventricular global systolic function.  Right Atrium: The right atrial size is mildly dilated.  Aortic Valve: The aortic valve was not well visualized. There is moderate to severe aortic valve cusp calcification. There is evidence of moderate to severe aortic valve stenosis.  There is no evidence of aortic valve regurgitation.  Mitral Valve: The  mitral valve is mildly thickened. There is mild mitral annular calcification. There is trace mitral valve regurgitation. The E Vmax is 0.86 m/s.  Tricuspid Valve: The tricuspid valve is structurally normal. No evidence of tricuspid regurgitation.  Pulmonic Valve: The pulmonic valve is not well visualized. There is no indication of pulmonic valve regurgitation.  Pericardium: No pericardial effusion noted.  Aorta: The aortic root was not well visualized.  Systemic Veins: The inferior vena cava was not well visualized, IVC inspiratory collapse is not well visualized.       CONCLUSIONS:   1. Left ventricular ejection fraction is normal by visual estimate at 60-65%.   2. Spectral Doppler shows a Grade I (impaired relaxation pattern) of left ventricular diastolic filling with normal left atrial filling pressure.   3. There is normal right ventricular global systolic function.   4. Mildly enlarged right ventricle.   5. The left atrial size is moderately dilated.   6. There is moderate to severe aortic valve cusp calcification.    QUANTITATIVE DATA SUMMARY:     2D MEASUREMENTS:           Normal Ranges:  LAs:             5.09 cm   (2.7-4.0cm)  IVSd:            1.23 cm   (0.6-1.1cm)  LVPWd:           0.96 cm   (0.6-1.1cm)  LVIDd:           4.07 cm   (3.9-5.9cm)  LVIDs:           2.29 cm  LV Mass Index:   83.7 g/m2  LV % FS          43.7 %       LEFT ATRIUM:                Normal Ranges:  LA Vol A4C:       90.5 ml   (22+/-6mL/m2)  LA Vol Index A4C: 50.9ml/m2  LA Vol A4C:       80.0 ml       RIGHT ATRIUM:          Normal Ranges:  RA Area A4C:  23.0 cm2       LV SYSTOLIC FUNCTION:                       Normal Ranges:  EF-Visual:      63 %  LV EF Reported: 63 %       LV DIASTOLIC FUNCTION:             Normal Ranges:  MV Peak E:             0.86 m/s    (0.7-1.2 m/s)  MV Peak A:             0.34 m/s    (0.42-0.7 m/s)  E/A Ratio:             2.53        (1.0-2.2)  MV lateral e'          0.06 m/s  MV A Dur:              254.04  msec       MITRAL VALVE:          Normal Ranges:  MV DT:        173 msec (150-240msec)       AORTIC VALVE:           Normal Ranges:  AoV Vmax:     1.59 m/s  (<=1.7m/s)  AoV Peak PG:  10.1 mmHg (<20mmHg)  LVOT Max Edgar: 0.93 m/s  (<=1.1m/s)  LVOT VTI:     14.93 cm       RIGHT VENTRICLE:  RV Basal 4.57 cm  RV Major 7.3 cm  TAPSE:   8.3 mm  RV s'    0.07 m/s       TRICUSPID VALVE/RVSP:        Normal Ranges:  Est. RA Pressure:     3 mmHg       [id number] Tyrel Hudson MD  Electronically signed on 8/8/2025 at 9:54:29 AM       ** Final **  MR brain wo IV contrast, MR angio neck wo IV contrast, MR angio head wo IV contrast  Narrative: Interpreted By:  Joseph Reyes,   STUDY:  MR BRAIN WO IV CONTRAST; MR ANGIO HEAD WO IV CONTRAST; MR ANGIO NECK  WO IV CONTRAST;  8/7/2025 10:53 pm; 8/7/2025 10:55 pm; 8/7/2025 10:56  pm      INDICATION:  Signs/Symptoms:TIA.      COMPARISON:  Same day noncontrast CT head      ACCESSION NUMBER(S):  [id number]; [id number]; [id number]      ORDERING CLINICIAN:  OLIVIER HOYOS      TECHNIQUE:  Axial T2, FLAIR, DWI, gradient echo T2 and  sagittal and coronal T1  weighted images of brain were acquired.      Time-of-flight MRA of the head  and neck was performed. The images  were reviewed as source images and maximum intensity projections.      FINDINGS:  Brain:      There is a small 2-3 mm focus of hyperintense DWI signal are present  in the right occipital lobe (series 502, image 16) too small to  definitely characterized, although tiny focus of acute infarction is  not excluded.      No geographic area of diffusion restriction is identified.      There is no evidence of recent intracranial hemorrhage. No mass  effect or midline shift is present.      Moderate brain parenchymal volume loss is present without abnormal  ventricular dilatation. Basal cisterns are patent. No extra-axial  collections are present.      Low volume of FLAIR and T2 signal changes are present in the  periventricular  and subcortical white matter of bilateral cerebral  hemispheres, likely representing sequela of microvascular disease.      No abnormal fluid signal is present in the paranasal sinuses or  mastoid air cells.      MRA of head:      MRA source images are significantly degraded by motion.      Within limitations of the study, there is symmetric flow enhancement  in the intracranial carotid arteries bilaterally without evidence of  occlusion, although areas of mild-to-moderate stenosis are not  excluded.      Visualized A1 and proximal A2 segments of the anterior cerebral  arteries do not demonstrate any evidence of occlusion, although more  distal arteries are poorly visualized due to motion.      M1 and M2 segments of the middle cerebral arteries do not demonstrate  any high-grade stenosis or occlusion bilaterally, although more  distal M3 segments are suboptimally evaluated.      Visualized vertebral arteries and basilar artery do not demonstrate  any evidence of occlusion.      Visualized proximal posterior cerebral arteries demonstrate symmetric  flow enhancement without evidence of occlusion.      MRA of neck:      The source images are significantly degraded by artifact.      Within limitations of the study, no occlusion is identified in the  common carotid artery, although mild stenosis is not excluded  distally.      Extracranial internal carotid arteries do not demonstrate any  evidence of occlusion bilaterally, although mild stenosis is not  excluded distally.      Extracranial vertebral arteries are symmetric in appearance without  evidence of occlusion.      Impression: MRI Brain:  1.  A 2-3 mm focus of hyperintense DWI signal is present in the right  parietal lobe, too small to definitely characterize, although tiny  focus of acute or early subacute infarct is not entirely excluded. No  large vascular territory infarction is identified.  2. Mild volume of FLAIR and T2 signal changes are present in  the  periventricular and subcortical white matter of bilateral cerebral  hemispheres, nonspecific findings favored to represent sequela of  microvascular disease.      MRA:  1.  MRA source images are significantly degraded by motion in the  head and neck.  2. Within limitations of the study, no definite evidence of large  vessel occlusion is identified in the proximal intracranial  circulation. No high-grade stenosis is identified in the large  arteries of the neck, although mild narrowing is not excluded due to  motion.      MACRO:  None      Signed by: Joseph Reyes 8/8/2025 12:19 AM  Dictation workstation:   [id number]      Physical Exam  Vitals reviewed.   Constitutional:       Comments: Elderly. Limited historian.    HENT:      Head: Normocephalic and atraumatic.     Eyes:      Extraocular Movements: Extraocular movements intact.      Conjunctiva/sclera: Conjunctivae normal.       Cardiovascular:      Rate and Rhythm: Normal rate and regular rhythm.   Pulmonary:      Effort: Pulmonary effort is normal.      Breath sounds: Normal breath sounds. No wheezing, rhonchi or rales.   Abdominal:      General: Bowel sounds are normal.      Palpations: Abdomen is soft.      Tenderness: There is no abdominal tenderness.     Musculoskeletal:      Cervical back: Normal range of motion and neck supple.     Skin:     General: Skin is warm and dry.     Neurological:      General: No focal deficit present.      Mental Status: He is alert and oriented to person, place, and time.      Motor: Weakness present.      Comments: Forgetful       Relevant Results  Lab Results   Component Value Date    GLUCOSE 96 08/10/2025    CALCIUM 9.0 08/10/2025     (L) 08/10/2025    K 3.2 (L) 08/10/2025    CO2 29 08/10/2025    CL 99 08/10/2025    BUN 24 (H) 08/10/2025    CREATININE 0.57 08/10/2025      Lab Results   Component Value Date    WBC 5.8 08/10/2025    HGB 14.7 08/10/2025    HCT 43.3 08/10/2025    MCV 92 08/10/2025      08/10/2025    Carotid duplex bilateral  Result Date: 8/8/2025  CONCLUSIONS:     Right Carotid: Findings are consistent with less than 50% stenosis of the right proximal internal carotid artery. Right external carotid artery appears patent with no evidence of stenosis. The right vertebral artery is patent with antegrade flow. No evidence of hemodynamically significant stenosis in the right subclavian artery.     Left Carotid: Findings are consistent with less than 50% stenosis of the left proximal internal carotid artery. Left external carotid artery appears patent with no evidence of stenosis. The left vertebral artery is patent with antegrade flow. No evidence of hemodynamically significant stenosis in the left subclavian artery.      Transthoracic Echo Complete  Result Date: 8/8/2025  CONCLUSIONS:  1. Left ventricular ejection fraction is normal by visual estimate at 60-65%.  2. Spectral Doppler shows a Grade I (impaired relaxation pattern) of left ventricular diastolic filling with normal left atrial filling pressure.  3. There is normal right ventricular global systolic function.  4. Mildly enlarged right ventricle.  5. The left atrial size is moderately dilated.  6. There is moderate to severe aortic valve cusp calcification. Tyrel Hudson MD Electronically signed on 8/8/2025 at 9:54:29 AM  ** Final **     MR brain wo IV contrast  Result Date: 8/8/2025  MRI Brain: 1.  A 2-3 mm focus of hyperintense DWI signal is present in the right parietal lobe, too small to definitely characterize, although tiny focus of acute or early subacute infarct is not entirely excluded. No large vascular territory infarction is identified. 2. Mild volume of FLAIR and T2 signal changes are present in the periventricular and subcortical white matter of bilateral cerebral hemispheres, nonspecific findings favored to represent sequela of microvascular disease.   MRA: 1.  MRA source images are significantly degraded by motion in the head  and neck. 2. Within limitations of the study, no definite evidence of large vessel occlusion is identified in the proximal intracranial circulation. No high-grade stenosis is identified in the large arteries of the neck, although mild narrowing is not excluded due to motion.   MACRO: None   Signed by: Joseph Reyes 8/8/2025 12:19 AM Dictation workstation:   [id number]    MR angio neck wo IV contrast  Result Date: 8/8/2025  MRI Brain: 1.  A 2-3 mm focus of hyperintense DWI signal is present in the right parietal lobe, too small to definitely characterize, although tiny focus of acute or early subacute infarct is not entirely excluded. No large vascular territory infarction is identified. 2. Mild volume of FLAIR and T2 signal changes are present in the periventricular and subcortical white matter of bilateral cerebral hemispheres, nonspecific findings favored to represent sequela of microvascular disease.   MRA: 1.  MRA source images are significantly degraded by motion in the head and neck. 2. Within limitations of the study, no definite evidence of large vessel occlusion is identified in the proximal intracranial circulation. No high-grade stenosis is identified in the large arteries of the neck, although mild narrowing is not excluded due to motion.   MACRO: None   Signed by: Joseph Reyes 8/8/2025 12:19 AM Dictation workstation:   [id number]    MR angio head wo IV contrast  Result Date: 8/8/2025  MRI Brain: 1.  A 2-3 mm focus of hyperintense DWI signal is present in the right parietal lobe, too small to definitely characterize, although tiny focus of acute or early subacute infarct is not entirely excluded. No large vascular territory infarction is identified. 2. Mild volume of FLAIR and T2 signal changes are present in the periventricular and subcortical white matter of bilateral cerebral hemispheres, nonspecific findings favored to represent sequela of microvascular disease.   MRA: 1.  MRA  source images are significantly degraded by motion in the head and neck. 2. Within limitations of the study, no definite evidence of large vessel occlusion is identified in the proximal intracranial circulation. No high-grade stenosis is identified in the large arteries of the neck, although mild narrowing is not excluded due to motion.   MACRO: None   Signed by: Joseph Reyes 8/8/2025 12:19 AM Dictation workstation:   [id number]    CT head wo IV contrast  Result Date: 8/7/2025  NO ACUTE INTRACRANIAL PROCESS   MACRO: None   Signed by: Rj Tejeda 8/7/2025 1:31 PM Dictation workstation:   [id number]    XR chest 1 view  Result Date: 8/7/2025  1. Mild enlargement of the cardiac silhouette without acute cardiopulmonary process.       MACRO: None   Signed by: Jabier Whitfield 8/7/2025 12:20 PM Dictation workstation:   [id number]                          Assessment & Plan  Weakness    Right-sided weakness  Rule out TIA/CVA  Noted right upper and lower extremity weakness and poor coordination  Monitor on telemetry  Eliquis resumed, stop ASA, continue statin  lipid panel in AM  MRI/MRA showed possible acute vs early subacute infarct in the right parietal lobe  US carotid with <50% stenosis B/L  Echocardiogram showed normal EF  neurostroke assessment  consult neurology, appreciate recommendations-reviewed MRI, unlikely CVA    Hypertension  Not on any antihypertensives  Monitor blood pressure    Hyperlipidemia  Statin    Glaucoma  Resume home eyedrops    History of aortic valve stenosis status post TAVR  On Eliquis    Plan  Monitor on telemetry  Stroke workup  PT/OT  DVT prophylaxis  Fall precautions  Patient is medically stable. Discharge to SNF when arrangements can be made    CODE STATUS: Discussed with the patient's daughter and niece, patient is a full code             Aure Mayers, APRN-CNP

## 2025-08-10 NOTE — CARE PLAN
The patient's goals for the shift include      The clinical goals for the shift include Maintain comfort and safety

## 2025-08-10 NOTE — NURSING NOTE
BSSR given and patient is awake and hearing aides are in his . Call light and possession within reach and bed alarm on.

## 2025-08-10 NOTE — NURSING NOTE
Assumed care of patient, patient is awake, no IV in place and call light and possessions within reach. No brief in bed due to breakdown.

## 2025-08-10 NOTE — PROGRESS NOTES
08/10/25 1547   Discharge Planning   Expected Discharge Disposition SNF   Does the patient need discharge transport arranged? Yes   [facility] coordination needed? Yes     SNF choices received, referral built and sent to Willa Gerardo II, , [facility]  of [place] for review.     Precert will be needed

## 2025-08-11 LAB
ANION GAP SERPL CALCULATED.3IONS-SCNC: 9 MMOL/L (ref 10–20)
BUN SERPL-MCNC: 31 MG/DL (ref 6–23)
CALCIUM SERPL-MCNC: 9.5 MG/DL (ref 8.6–10.3)
CHLORIDE SERPL-SCNC: 99 MMOL/L (ref 98–107)
CO2 SERPL-SCNC: 30 MMOL/L (ref 21–32)
CREAT SERPL-MCNC: 0.83 MG/DL (ref 0.5–1.3)
EGFRCR SERPLBLD CKD-EPI 2021: 79 ML/MIN/1.73M*2
ERYTHROCYTE [DISTWIDTH] IN BLOOD BY AUTOMATED COUNT: 14.6 % (ref 11.5–14.5)
GLUCOSE BLD MANUAL STRIP-MCNC: 106 MG/DL (ref 74–99)
GLUCOSE SERPL-MCNC: 102 MG/DL (ref 74–99)
HCT VFR BLD AUTO: 44.1 % (ref 41–52)
HGB BLD-MCNC: 14.6 G/DL (ref 13.5–17.5)
MCH RBC QN AUTO: 31.2 PG (ref 26–34)
MCHC RBC AUTO-ENTMCNC: 33.1 G/DL (ref 32–36)
MCV RBC AUTO: 94 FL (ref 80–100)
NRBC BLD-RTO: 0 /100 WBCS (ref 0–0)
PLATELET # BLD AUTO: 184 X10*3/UL (ref 150–450)
POTASSIUM SERPL-SCNC: 3.2 MMOL/L (ref 3.5–5.3)
RBC # BLD AUTO: 4.68 X10*6/UL (ref 4.5–5.9)
SODIUM SERPL-SCNC: 135 MMOL/L (ref 136–145)
WBC # BLD AUTO: 7.1 X10*3/UL (ref 4.4–11.3)

## 2025-08-11 PROCEDURE — 99232 SBSQ HOSP IP/OBS MODERATE 35: CPT | Performed by: NURSE PRACTITIONER

## 2025-08-11 PROCEDURE — 92610 EVALUATE SWALLOWING FUNCTION: CPT | Mod: GN | Performed by: SPEECH-LANGUAGE PATHOLOGIST

## 2025-08-11 PROCEDURE — 97530 THERAPEUTIC ACTIVITIES: CPT | Mod: GO,CO

## 2025-08-11 PROCEDURE — 85027 COMPLETE CBC AUTOMATED: CPT | Performed by: NURSE PRACTITIONER

## 2025-08-11 PROCEDURE — 80048 BASIC METABOLIC PNL TOTAL CA: CPT | Performed by: NURSE PRACTITIONER

## 2025-08-11 PROCEDURE — 2500000001 HC RX 250 WO HCPCS SELF ADMINISTERED DRUGS (ALT 637 FOR MEDICARE OP): Performed by: NURSE PRACTITIONER

## 2025-08-11 PROCEDURE — 2500000002 HC RX 250 W HCPCS SELF ADMINISTERED DRUGS (ALT 637 FOR MEDICARE OP, ALT 636 FOR OP/ED): Performed by: NURSE PRACTITIONER

## 2025-08-11 PROCEDURE — 36415 COLL VENOUS BLD VENIPUNCTURE: CPT | Performed by: NURSE PRACTITIONER

## 2025-08-11 PROCEDURE — 97530 THERAPEUTIC ACTIVITIES: CPT | Mod: GP

## 2025-08-11 PROCEDURE — 2500000001 HC RX 250 WO HCPCS SELF ADMINISTERED DRUGS (ALT 637 FOR MEDICARE OP): Performed by: STUDENT IN AN ORGANIZED HEALTH CARE EDUCATION/TRAINING PROGRAM

## 2025-08-11 PROCEDURE — 82947 ASSAY GLUCOSE BLOOD QUANT: CPT

## 2025-08-11 PROCEDURE — G0378 HOSPITAL OBSERVATION PER HR: HCPCS

## 2025-08-11 RX ORDER — POTASSIUM CHLORIDE 20 MEQ/1
40 TABLET, EXTENDED RELEASE ORAL ONCE
Status: COMPLETED | OUTPATIENT
Start: 2025-08-11 | End: 2025-08-11

## 2025-08-11 RX ADMIN — APIXABAN 2.5 MG: 2.5 TABLET, FILM COATED ORAL at 08:21

## 2025-08-11 RX ADMIN — POTASSIUM CHLORIDE 40 MEQ: 1500 TABLET, EXTENDED RELEASE ORAL at 08:21

## 2025-08-11 RX ADMIN — APIXABAN 2.5 MG: 2.5 TABLET, FILM COATED ORAL at 21:58

## 2025-08-11 RX ADMIN — ACETAMINOPHEN 650 MG: 325 TABLET ORAL at 21:58

## 2025-08-11 RX ADMIN — TIMOLOL MALEATE 1 DROP: 2.5 SOLUTION/ DROPS OPHTHALMIC at 21:58

## 2025-08-11 RX ADMIN — TIMOLOL MALEATE 1 DROP: 2.5 SOLUTION/ DROPS OPHTHALMIC at 08:21

## 2025-08-11 RX ADMIN — Medication 5 MG: at 21:58

## 2025-08-11 RX ADMIN — ATORVASTATIN CALCIUM 40 MG: 40 TABLET, FILM COATED ORAL at 21:58

## 2025-08-11 ASSESSMENT — PAIN SCALES - GENERAL
PAINLEVEL_OUTOF10: 0 - NO PAIN
PAINLEVEL_OUTOF10: 9
PAINLEVEL_OUTOF10: 3
PAINLEVEL_OUTOF10: 0 - NO PAIN
PAINLEVEL_OUTOF10: 0 - NO PAIN

## 2025-08-11 ASSESSMENT — COGNITIVE AND FUNCTIONAL STATUS - GENERAL
CLIMB 3 TO 5 STEPS WITH RAILING: TOTAL
TOILETING: TOTAL
HELP NEEDED FOR BATHING: A LOT
MOVING TO AND FROM BED TO CHAIR: TOTAL
DAILY ACTIVITIY SCORE: 6
CLIMB 3 TO 5 STEPS WITH RAILING: TOTAL
STANDING UP FROM CHAIR USING ARMS: TOTAL
EATING MEALS: A LOT
MOVING TO AND FROM BED TO CHAIR: TOTAL
MOVING FROM LYING ON BACK TO SITTING ON SIDE OF FLAT BED WITH BEDRAILS: A LOT
MOVING FROM LYING ON BACK TO SITTING ON SIDE OF FLAT BED WITH BEDRAILS: A LOT
CLIMB 3 TO 5 STEPS WITH RAILING: TOTAL
MOVING FROM LYING ON BACK TO SITTING ON SIDE OF FLAT BED WITH BEDRAILS: A LOT
MOBILITY SCORE: 8
TURNING FROM BACK TO SIDE WHILE IN FLAT BAD: A LOT
DRESSING REGULAR LOWER BODY CLOTHING: TOTAL
DRESSING REGULAR LOWER BODY CLOTHING: TOTAL
TOILETING: TOTAL
HELP NEEDED FOR BATHING: TOTAL
DAILY ACTIVITIY SCORE: 10
HELP NEEDED FOR BATHING: TOTAL
STANDING UP FROM CHAIR USING ARMS: TOTAL
WALKING IN HOSPITAL ROOM: TOTAL
TOILETING: TOTAL
DRESSING REGULAR UPPER BODY CLOTHING: A LOT
MOVING TO AND FROM BED TO CHAIR: A LOT
TURNING FROM BACK TO SIDE WHILE IN FLAT BAD: A LOT
WALKING IN HOSPITAL ROOM: TOTAL
WALKING IN HOSPITAL ROOM: TOTAL
EATING MEALS: TOTAL
DRESSING REGULAR UPPER BODY CLOTHING: TOTAL
PERSONAL GROOMING: TOTAL
MOBILITY SCORE: 9
DRESSING REGULAR UPPER BODY CLOTHING: TOTAL
DRESSING REGULAR LOWER BODY CLOTHING: TOTAL
EATING MEALS: TOTAL
STANDING UP FROM CHAIR USING ARMS: TOTAL
DAILY ACTIVITIY SCORE: 6
PERSONAL GROOMING: A LOT
MOBILITY SCORE: 8
PERSONAL GROOMING: TOTAL
TURNING FROM BACK TO SIDE WHILE IN FLAT BAD: A LOT

## 2025-08-11 ASSESSMENT — PAIN - FUNCTIONAL ASSESSMENT
PAIN_FUNCTIONAL_ASSESSMENT: 0-10
PAIN_FUNCTIONAL_ASSESSMENT: WONG-BAKER FACES
PAIN_FUNCTIONAL_ASSESSMENT: 0-10

## 2025-08-11 ASSESSMENT — PAIN DESCRIPTION - DESCRIPTORS: DESCRIPTORS: ACHING

## 2025-08-11 ASSESSMENT — PAIN DESCRIPTION - ORIENTATION: ORIENTATION: LOWER

## 2025-08-11 ASSESSMENT — PAIN DESCRIPTION - LOCATION: LOCATION: GENERALIZED

## 2025-08-11 ASSESSMENT — PAIN SCALES - WONG BAKER: WONGBAKER_NUMERICALRESPONSE: HURTS LITTLE MORE

## 2025-08-11 NOTE — PROGRESS NOTES
"Nutrition Follow up Note    Nutrition Assessment      Spoke with pt at bedside, pt is [place] but answered all questions and was pleasant to speak with. Pt seen by SLP 8/11, and recommended to continue regular diet with thin liquids. Pt may need assistance at meals, as he states he cannot hold a fork in his right hand.     Nutrition History:  Food and Nutrient History: pt reports a decreasing appetite, stating that he has been backing off of food as it is challenging to eat. he cannot hold a fork in his right hand. pt reports diarrhea. pt agreeable to continue to receive ensure plus and magic cups TID.     Anthropometrics:  Ht: 180.3 cm (5' 11\"), Wt: 62.3 kg (137 lb 6.4 oz), BMI: 19.17  IBW/kg (Dietitian Calculated): 78.18 kg  Percent of IBW: 78 %       Weight Change:  Daily Weight  08/11/25 : 62.3 kg (137 lb 6.4 oz)  07/31/25 : 60.8 kg (134 lb)  06/30/25 : 66.7 kg (147 lb)  01/31/25 : 66.7 kg (147 lb)  10/03/24 : 65.3 kg (144 lb)  04/25/24 : 73.9 kg (163 lb)  02/12/24 : 73.9 kg (163 lb)  02/02/24 : 72.1 kg (159 lb)  12/18/23 : 73.5 kg (162 lb)  05/17/23 : 73.9 kg (163 lb)     Weight History / % Weight Change: 8/8: Per chart review, pt with 8.8% weight loss in <3 months. However, suspect most weights in chart are stated so unable to fully assess timeline for weight loss. 8/11: pt lost 13# (8.8%) over 1 month, 1 week from 6/30-8/7. pt reports that he used to weigh 170# but has lost wt.           Nutrition Focused Physical Exam Findings:   Subcutaneous Fat Loss  Defer Subcutaneous Fat Loss Assessment: Defer all  Defer All Reason: remote assessment  Orbital Fat Pads: Severe (dark circles, hollowing and loose skin)  Buccal Fat Pads: Severe (hollow, sunken and narrow face)    Muscle Wasting  Defer Muscle Wasting Assessment: Defer all  Defer All Reason: remote assessment  Temporalis: Severe (hollowed scooping depression)  Pectoralis (Clavicular Region): Severe (protruding prominent clavicle)  Deltoid/Trapezius: Severe " (squared shoulders, acromion process prominent)  Interosseous: Severe (depressed area between thumb and forefinger)  Trapezius/Infraspinatus/Supraspinatus (Scapular Region): Severe (prominent visual scapula, depression between ribs, scapula or shoulder)  Quadriceps: Severe (depressions on inner and outer thigh)  Gastrocnemius: Severe (minimal muscle definition)    Edema  Edema: none    Physical Findings  Skin: Negative    Nutrition Significant Labs:  Lab Results   Component Value Date    WBC 7.1 08/11/2025    HGB 14.6 08/11/2025    HCT 44.1 08/11/2025     08/11/2025    CHOL 147 08/08/2025    TRIG 74 08/08/2025    HDL 43.0 08/08/2025    ALT 13 08/07/2025    AST 28 08/07/2025     (L) 08/11/2025    K 3.2 (L) 08/11/2025    CL 99 08/11/2025    CREATININE 0.83 08/11/2025    BUN 31 (H) 08/11/2025    CO2 30 08/11/2025    TSH 3.05 12/11/2023    HGBA1C 5.2 08/08/2025     Nutrition Specific Medications:  Scheduled Medications[1]  Continuous Medications[2]    Dietary Orders (From admission, onward)       Start     Ordered    08/07/25 2008  May Participate in Room Service With Assistance  ( ROOM SERVICE MAY PARTICIPATE WITH ASSISTANCE)  Once        Question:  .  Answer:  Yes    08/07/25 2007 08/07/25 1739  Oral nutritional supplements  Until discontinued        Question Answer Comment   Deliver with All meals    Select supplement: Magic Cup        08/07/25 1738 08/07/25 1739  Oral nutritional supplements  Until discontinued        Question Answer Comment   Deliver with All meals    Select supplement: Ensure Plus High Protein        08/07/25 1738 08/07/25 1738  Adult diet Regular  Diet effective now        Question:  Diet type  Answer:  Regular    08/07/25 1737                  Estimated Needs:   Estimated Energy Needs  Total Energy Estimated Needs in 24 hours (kCal):  (3114-0712)  Energy Estimated Needs per kg Body Weight in 24 hours (kCal/kg):  (35-40)  Method for Estimating Needs: ABW    Estimated  Protein Needs  Total Protein Estimated Needs in 24 Hours (g):  (74-93)  Protein Estimated Needs per kg Body Weight in 24 Hours (g/kg):  (1.2-1.5)  Method for Estimating 24 Hour Protein Needs: ABW    Estimated Fluid Needs  Total Fluid Estimated Needs in 24 Hours (mL): 1824 mL  Method for Estimating 24 Hour Fluid Needs: 1ml/kcal or per MD     Nutrition Diagnosis   Nutrition Diagnosis:  Malnutrition Diagnosis  Patient has Malnutrition Diagnosis: Yes  Diagnosis Status: New  Malnutrition Diagnosis: Severe malnutrition related to chronic disease or condition  Related to: decreased ability to consume/tolerate sufficient energy  As Evidenced by: po intake </= 75% estimated needs for >/= 1 month and severe subcutaneous fat loss and muscle wasting    Nutrition Diagnosis  Patient has Nutrition Diagnosis: Yes  Diagnosis Status (1): Active  Nutrition Diagnosis 1: Inadequate oral intake  Related to (1): decreased ability to consume sufficient energy  As Evidenced by (1): po intakes <50% of meals, weight loss       Nutrition Interventions/Recommendations   Nutrition Interventions and Recommendations:  Nutrition Prescription: Nutrition prescription for oral nutrition    Nutrition Recommendations:  Individualized Nutrition Prescription Provided for : Continue regular diet as ordered per SLP recs. Continue Ensure Plus High Protein TID and Magic cup TID to supplement po intake    Nutrition Interventions/Goals:   Food and/or Nutrient Delivery Interventions  Interventions: Meals and snacks, Medical food supplement  Meals and Snacks: General healthful diet  Goal: Continue regular diet as ordered per SLP recs  Medical Food Supplement: Commercial beverage medical food supplement therapy, Commercial food medical food supplement therapy  Goal: Ensure Plus High Protein to provide 350 kcal, 20 g protein each. Magic Cup to provide 290 kcal, 9 g protein each.    Education Documentation  No documentation found.            Nutrition Monitoring and  Evaluation   Monitoring/Evaluation:   Food/Nutrient Related History Monitoring  Monitoring and Evaluation Plan: Estimated Energy Intake, Intake / amount of food  Estimated Energy Intake: Energy intake greater or equal to 75% of estimated energy needs  Intake / Amount of food: Consumes at least 75% or more of meals/snacks/supplements    Anthropometric Measurements  Monitoring and Evaluation Plan: Body weight  Body Weight: Body weight - Maintain stable weight, Body weight - Promote weight restoration    Goal Status: New goal(s) identified    Follow Up  Time Spent (min): 30 minutes  Last Date of Nutrition Visit: 08/11/25  Nutrition Follow-Up Needed?: 3-5 days  Follow up Comment: 8/15/2025          [1] apixaban, 2.5 mg, oral, BID  atorvastatin, 40 mg, oral, Nightly  melatonin, 5 mg, oral, Nightly  timolol, 1 drop, Both Eyes, BID     [2]

## 2025-08-11 NOTE — PROGRESS NOTES
Speech-Language Pathology    Speech-Language Pathology Clinical Swallow Evaluation    Patient Name: Kolton Acosta  MRN: [id number]  : 8/3/1926  Today's Date: 25  Start Time: 1010  Stop Time: 1035  Time Calculation (min): 25 min    ASSESSMENT  Impressions:  functional oral phase and no suspected pharyngeal phase dysphagia based on clinical swallow evaluation. Pt would benefit from skilled ST to minimize aspiration risk and ensure ongoing safety with the least restrictive diet.  Prognosis: Good    PLAN  RECOMMENDATIONS:  Is MBSS recommended? No; no pharyngeal dysphagia suspected.  Solid consistency: Regular (IDDSI level 7)  Liquid consistency: Thin (IDDSI 0)  Medication administration: Whole in thin liquid, Whole in puree    Compensatory swallow strategies:  - Upright positioning for all PO intake  - Small/SINGLE sips  - One bite/sip at a time  - Total assist for feeding    Recommended frequency/duration:  Skilled SLP services recommended: Yes  Frequency: 2x/week  Duration: 3 weeks  Discharge recommendation: Recommend MODERATE intensity ST upon DC in order to ensure safety with least restrictive diet.  Treatment/Interventions: Assess diet tolerance, Patient/family education  Strengths: Motivation and Family/caregiver support  Barriers to participation in tx: Cognition, Age, and Hearing impairment    Goals (start date 2025):    - Pt will consume prescribed diet (current diet is regular and thin liquids) without overt s/sx aspiration/penetration in 95% of observed trials.  - Pt will demonstrate follow-through of trained compensatory strategies during a meal/snack with 90% acc independently.   Status: Goal initiated   Progress this date: TBD      SUBJECTIVE    PMHx relevant to rehab: past medical history of aortic valve stenosis status post TAVR, chronic systolic heart failure, coronary artery disease, hypertension, hyperlipidemia, paroxysmal atrial fibrillation, on Eliquis,    Chief complaint: Pt was admitted  on  8/7/25 presented to the emergency department with complaints of weakness.He recently moved into an assisted living facility.   Chief Complaint   Patient presents with    Weakness, Gen   . He was found to have Weakness.    Relevant imaging results:  MRI 8/7/25  MPRESSION:  MRI Brain:  1.  A 2-3 mm focus of hyperintense DWI signal is present in the right  parietal lobe, too small to definitely characterize, although tiny  focus of acute or early subacute infarct is not entirely excluded. No  large vascular territory infarction is identified.  2. Mild volume of FLAIR and T2 signal changes are present in the  periventricular and subcortical white matter of bilateral cerebral  hemispheres, nonspecific findings favored to represent sequela of  microvascular disease.      MRA:  1.  MRA source images are significantly degraded by motion in the  head and neck.  2. Within limitations of the study, no definite evidence of large  vessel occlusion is identified in the proximal intracranial  circulation. No high-grade stenosis is identified in the large  arteries of the neck, although mild narrowing is not excluded due to  motion.  CXR 8/7/25  LUNGS:  There is no consolidation. There is no effusion. There is no edema  CT Head 8/8/25  General Visit Information:   IMPRESSION:  No evidence of new hemorrhage, CT apparent transcortical infarct, or  other acute intracranial abnormality.      Patient Class: Inpatient  Living Environment: Assissted living/independent living     Reason for Referral: assess swallow per CVA protocol  Prior to Session Communication: Bedside nurse    RN cleared pt to participate in session and reported pt is coughing a lot, very [place], admitted with r/o TIA/CVA    Pt reported difficulty feeding self with right hand, no difficulty with swallowing    Date of Onset: 08/07/25  Date of Order: 08/11/25  [employer] Diet: regular and thin liquids  Current Diet : regular and thin liquids    Status at time of  evaluation:  Pain Assessment  Pain Assessment: 0-10  0-10 (Numeric) Pain Score: 9  Pain Type: Acute pain  Pain Location: Hand  Pain Orientation: Right  Pain Descriptors: Aching    Pt was alert, cooperative, pleasantly confused, inattentive, and distracted for session.  Orientation: Oriented to self, Oriented to hospital but not name of facility, and Oriented to month  Ability to follow functional commands: Follows simple commands  Nutritional status: Malnutrition documented in EMR    Respiratory status: Room air  Baseline Vocal Quality: Dysphonic (suspect baseline, pt unable to  provide accurate history)  Volitional Cough: Strong  Volitional Swallow: Within Functional Limits  Patient positioning: Upright in bed    OBJECTIVE  Clinical swallow evaluation completed and consisted of interview, oral motor assessment, and PO trials (approx 4 oz thin liquids, 2 oz pudding and 1 tam cracker).    ORAL PHASE: Upper dentures and lower partials in place, natural dentition in adequate condition. Oral mucosa were pink, moist, and free of obvious lesions. Lingual strength and ROM were WFL. Labial strength/ROM were WFL. Labial seal was adequate. Mastication of regular solids was   WFL A/P transit and oral clearance were WFL.    PHARYNGEAL PHASE: Laryngeal elevation was visualized or palpated with all trials, however adequacy of hyolaryngeal elevation/excursion cannot be determined at bedside. No immediate or delayed s/sx aspiration/penetration were observed with any consistencies.    Was 3oz challenge administered: Yes; pt drank 3oz of thin liquid in one attempt, without breaking, with no overt s/sx aspiration/penetration observed.     Treatment/Education:  Results and recommendations were relayed to: Patient, Bedside nurse, and Physician  Education provided: Yes   Learner: Patient   Barriers to learning: Cognitive limitations barrier and Hearing impairment barrier   Method of teaching: Verbal   Topic: role of ST, results of  assessment, and recommended safe swallow strategies   Outcome of teaching: Pt demonstrated partial understanding and Needs reinforcement  Treatment provided: No  Next Treatment Priority: diet tolerance, compensatory strategies

## 2025-08-11 NOTE — CARE PLAN
The patient's goals for the shift include      The clinical goals for the shift include rest      Problem: General Stroke  Goal: Establish a mutual long term goal with patient by discharge  Outcome: Progressing  Goal: Demonstrate improvement in neurological exam throughout the shift  Outcome: Progressing  Goal: Maintain BP within ordered limits throughout shift  Outcome: Progressing  Goal: Participate in treatment (ie., meds, therapy) throughout shift  Outcome: Progressing  Goal: No symptoms of aspiration throughout shift  Outcome: Progressing  Goal: No symptoms of hemorrhage throughout shift  Outcome: Progressing  Goal: Tolerate enteral feeding throughout shift  Outcome: Progressing  Goal: Decreased nausea/vomiting throughout shift  Outcome: Progressing  Goal: Controlled blood glucose throughout shift  Outcome: Progressing  Goal: Out of bed three times today  Outcome: Progressing     Problem: ICU Stroke  Goal: Maintain ICP within ordered limits throughout shift  Outcome: Progressing  Goal: Tolerate EVD clamping trial throughout shift  Outcome: Progressing  Goal: Tolerate ventilator weaning trial during shift  Outcome: Progressing  Goal: Maintain patent airway throughout shift  Outcome: Progressing  Goal: Achieve/maintain targeted sodium level throughout shift  Outcome: Progressing     Problem: Pain - Adult  Goal: Verbalizes/displays adequate comfort level or baseline comfort level  Outcome: Progressing     Problem: Safety - Adult  Goal: Free from fall injury  Outcome: Progressing     Problem: Discharge Planning  Goal: Discharge to home or other facility with appropriate resources  Outcome: Progressing     Problem: Chronic Conditions and Co-morbidities  Goal: Patient's chronic conditions and co-morbidity symptoms are monitored and maintained or improved  Outcome: Progressing     Problem: Nutrition  Goal: Nutrient intake appropriate for maintaining nutritional needs  Outcome: Progressing     Problem: Skin  Goal:  Decreased wound size/increased tissue granulation at next dressing change  Outcome: Progressing  Flowsheets (Taken 8/11/2025 0824)  Decreased wound size/increased tissue granulation at next dressing change: Promote sleep for wound healing  Goal: Participates in plan/prevention/treatment measures  Outcome: Progressing  Flowsheets (Taken 8/11/2025 0824)  Participates in plan/prevention/treatment measures: Discuss with provider PT/OT consult  Goal: Prevent/manage excess moisture  Outcome: Progressing  Flowsheets (Taken 8/11/2025 0824)  Prevent/manage excess moisture: Monitor for/manage infection if present  Goal: Prevent/minimize sheer/friction injuries  Outcome: Progressing  Flowsheets (Taken 8/11/2025 0824)  Prevent/minimize sheer/friction injuries: Use pull sheet  Goal: Promote/optimize nutrition  Outcome: Progressing  Flowsheets (Taken 8/11/2025 0824)  Promote/optimize nutrition: Monitor/record intake including meals  Goal: Promote skin healing  Outcome: Progressing  Flowsheets (Taken 8/11/2025 0824)  Promote skin healing: Turn/reposition every 2 hours/use positioning/transfer devices

## 2025-08-11 NOTE — PROGRESS NOTES
08/11/25 1011   Discharge Planning   Expected Discharge Disposition SNF  ([facility])     TCC spoke to daughter Ki updated regarding SNF referrals, [facility] does not have a bed agreeable to second choice [facility] at this time   Patient needs updated therapy notes to start precert   Therapy made aware     1414: Notes received DSC to start precert     ** do not discharge without speaking to care coordination**

## 2025-08-11 NOTE — ASSESSMENT & PLAN NOTE
Right-sided weakness  Rule out TIA/CVA  Noted right upper and lower extremity weakness and poor coordination  Monitor on telemetry  Eliquis resumed, stop ASA, continue statin  lipid panel in AM  MRI/MRA showed possible acute vs early subacute infarct in the right parietal lobe  US carotid with <50% stenosis B/L  Echocardiogram showed normal EF  neurostroke assessment  consult neurology, appreciate recommendations-reviewed MRI, unlikely CVA    Hypertension  Not on any antihypertensives  Monitor blood pressure    Hyperlipidemia  Statin    Glaucoma  Resume home eyedrops    History of aortic valve stenosis status post TAVR  On Eliquis    Plan  PT/OT  DVT prophylaxis  Fall precautions  Patient is medically stable. Discharge to SNF when arrangements can be made. Awaiting acceptance, then will need Precert

## 2025-08-11 NOTE — PROGRESS NOTES
Occupational Therapy    OT Treatment    Patient Name: Kolton Acosta  MRN: [id number]  Department: [address]  Room: [facility]405A  Today's Date: 8/11/2025  Time Calculation  Start Time: 1102  Stop Time: 1125  Time Calculation (min): 23 min        Assessment:  OT Assessment: Gradual progress made towards OT goals. Continue with current OT POC to increase strength, balance and functional tolerance to maximize safety and independence during ADLs.  Barriers to Discharge Home: Cognition needs, Physical needs, Caregiver assistance  Caregiver Assistance: Caregiver assistance needed per identified barriers - however, level of patient's required assistance exceeds assistance available at home  Cognition Needs: 24hr supervision for safety awareness needed, Medication and/or medical management daily assist needed, Recollection or understanding of home exercise program limited, Insight of patient limited regarding functional ability/needs, Cognition-related high falls risk  Physical Needs: Ambulating household distances limited by function/safety, 24hr mobility assistance needed, 24hr ADL assistance needed, High falls risk due to function or environment  Evaluation/Treatment Tolerance: Patient limited by fatigue  End of Session Communication: Bedside nurse  End of Session Patient Position: Bed, 3 rail up, Alarm on  OT Assessment Results: Decreased ADL status, Decreased upper extremity range of motion, Decreased upper extremity strength, Decreased safe judgment during ADL, Decreased cognition, Decreased endurance, Decreased sensation, Visual deficit, Decreased fine motor control, Decreased functional mobility, Decreased gross motor control, Decreased trunk control for functional activities  Barriers to Discharge: Decreased caregiver support  Evaluation/Treatment Tolerance: Patient limited by fatigue  Plan:  Treatment Interventions: ADL retraining, Functional transfer training, Visual perceptual retraining, UE strengthening/ROM, Endurance  training, Cognitive reorientation, Patient/family training, Equipment evaluation/education, Neuromuscular reeducation, Fine motor coordination activities, Compensatory technique education  OT Frequency: 4 times per week (during this acute inpatient hospitalization)  OT Discharge Recommendations: Moderate intensity level of continued care, 24 hr supervision due to cognition (Based on current functional status and rehab potential, patient is anticipated to tolerate and benefit from 5 or more days per week of skilled rehabilitative therapy after discharge from this acute inpatient hospitalization.)  Equipment Recommended upon Discharge: Wheeled walker  OT Recommended Transfer Status: Assist of 2  OT - OK to Discharge: Yes  Treatment Interventions: ADL retraining, Functional transfer training, Visual perceptual retraining, UE strengthening/ROM, Endurance training, Cognitive reorientation, Patient/family training, Equipment evaluation/education, Neuromuscular reeducation, Fine motor coordination activities, Compensatory technique education    Subjective   OT Visit Info:  OT Received On: 08/11/25  General Visit Info:  General  Reason for Referral: weakness, impaired ADL's/mobility  Past Medical History Relevant to Rehab: anemia, HTN, glaucoma, neuropathy, clavicle fx, aortic valve stenosis s/p AVR, a-fib, chronic systolic HF, CAD, hyperlipidemia  Co-Treatment: PT  Co-Treatment Reason: To optimize pt safety and outcomes  Prior to Session Communication: Bedside nurse  Patient Position Received: Bed, 3 rail up, Alarm on  General Comment: Patient cleared for therapy session per nursing, cooperative this date, significant confusion requiring increased time for task completion.  Precautions:  Hearing/Visual Limitations: significantly [place] despite B hearing aids, h/o glaucoma with glasses  Medical Precautions: Fall precautions  Precautions Comment: multiple abrasions observed BUE, telemetry            Pain:  Pain Assessment  Pain  Assessment: Hernandez-Baker FACES  Hernandez-Baker FACES Pain Rating: Hurts little more  Pain Type: Acute pain  Pain Location: Generalized  Clinical Progression: Not changed  Pain Interventions: Repositioned, Ambulation/increased activity  Response to Interventions: Content/relaxed    Objective    Cognition:  Cognition  Overall Cognitive Status: Impaired  Orientation Level: Disoriented to place, Disoriented to time, Disoriented to situation  Following Commands: Follows one step commands with repetition  Safety Judgment: Decreased awareness of need for assistance  Cognition Comments: pleasantly confused  Safety/Judgement: Exceptions to WFL  Insight: Severe  Impulsive: Moderately  Task Initiation: Initiates with cues  Processing Speed: Delayed  Coordination:  Movements are Fluid and Coordinated: No  Upper Body Coordination: poor gross/fine motor  Lower Body Coordination: slower rate of movement  Activities of Daily Living: Grooming  Grooming Comments: patient unable to follow commands to participate in ADLs at this time. Patient following <25% of single step commands this date.  Functional Standing Tolerance:     Bed Mobility/Transfers: Bed Mobility  Bed Mobility: Yes  Bed Mobility 1  Bed Mobility 1: Supine to sitting, Sitting to supine, Scooting  Level of Assistance 1: Maximum assistance, +2  Bed Mobility Comments 1: all aspects of bed mobility completed with use of draw sheet and maxAx2- verbal/ tactie cues required for sequencing. Patient able to intitially bring BLE towards EOB however required assistance to complete task and for trunk elevation    Transfer 1  Trials/Comments 1: sit<>stands completed from standard EOB height with FWW and maxAx2- patient pulling up from braced AD- verbal/ tactile cues required for sequencing. Observed B knee flexion and poor trunk/ hip extension in standing.    Sitting Balance:  Static Sitting Balance  Static Sitting-Balance Support: Bilateral upper extremity supported, Feet  supported  Static Sitting-Level of Assistance: Minimum assistance, Moderate assistance  Static Sitting-Comment/Number of Minutes: ~10min    Therapy/Activity: Therapeutic Activity  Therapeutic Activity Performed: Yes  Therapeutic Activity 1: EOB sitting trials completed ~10min with modA to complete functional tasks. Poor+ sitting balance observed this date with BUE/BLE supported. EOB sitting trials completed to increase functional tolerance, strength and challenge sitting balance to maximize potential during ADLs. LOB observed x2 requiring maxA to correct body positioning to midline to decrease risk of falls.      Outcome Measures:[facility] Daily Activity  Putting on and taking off regular lower body clothing: Total  Bathing (including washing, rinsing, drying): A lot  Putting on and taking off regular upper body clothing: A lot  Toileting, which includes using toilet, bedpan or urinal: Total  Taking care of personal grooming such as brushing teeth: A lot  Eating Meals: A lot  Daily Activity - Total Score: 10        Education Documentation  Body Mechanics, taught by HARMONY Hu at 8/11/2025  2:08 PM.  Learner: Patient  Readiness: Acceptance  Method: Explanation, Demonstration  Response: Needs Reinforcement, No Evidence of Learning  Comment: safety awareness throughout functional tasks/ transfers    ADL Training, taught by HARMONY Hu at 8/11/2025  2:08 PM.  Learner: Patient  Readiness: Acceptance  Method: Explanation, Demonstration  Response: Needs Reinforcement, No Evidence of Learning  Comment: safety awareness throughout functional tasks/ transfers    Education Comments  Education provided on role of OT/POC, safety awareness throughout functional tasks/transfers, importance of activity/ rest routine, and use of call light for assistance.      Goals:  Encounter Problems       Encounter Problems (Active)       OT Goals       Pt will complete self-feeding with setup. (Progressing)       Start:   08/08/25    Expected End:  09/08/25            Pt will complete all grooming tasks with CGA in standing. (Progressing)       Start:  08/08/25    Expected End:  09/08/25            Pt will complete all toileting tasks with CGA. (Progressing)       Start:  08/08/25    Expected End:  09/08/25            Pt will complete all functional transfers and mobility with CGA while using a RW. (Progressing)       Start:  08/08/25    Expected End:  09/08/25

## 2025-08-11 NOTE — CARE PLAN
The patient's goals for the shift include      The clinical goals for the shift include Maintain comfort and safety    Over the shift, the patient did not make progress toward the following goals. Barriers to progression include . Recommendations to address these barriers include   Problem: General Stroke  Goal: Establish a mutual long term goal with patient by discharge  Outcome: Progressing  Goal: Demonstrate improvement in neurological exam throughout the shift  Outcome: Progressing  Goal: Maintain BP within ordered limits throughout shift  Outcome: Progressing  Goal: Participate in treatment (ie., meds, therapy) throughout shift  Outcome: Progressing  Goal: No symptoms of aspiration throughout shift  Outcome: Progressing  Goal: No symptoms of hemorrhage throughout shift  Outcome: Progressing  Goal: Tolerate enteral feeding throughout shift  Outcome: Progressing  Goal: Decreased nausea/vomiting throughout shift  Outcome: Progressing  Goal: Controlled blood glucose throughout shift  Outcome: Progressing  Goal: Out of bed three times today  Outcome: Progressing     Problem: ICU Stroke  Goal: Maintain ICP within ordered limits throughout shift  Outcome: Progressing  Goal: Tolerate EVD clamping trial throughout shift  Outcome: Progressing  Goal: Tolerate ventilator weaning trial during shift  Outcome: Progressing  Goal: Maintain patent airway throughout shift  Outcome: Progressing  Goal: Achieve/maintain targeted sodium level throughout shift  Outcome: Progressing     Problem: Pain - Adult  Goal: Verbalizes/displays adequate comfort level or baseline comfort level  Outcome: Progressing     Problem: Safety - Adult  Goal: Free from fall injury  Outcome: Progressing     Problem: Discharge Planning  Goal: Discharge to home or other facility with appropriate resources  Outcome: Progressing     Problem: Chronic Conditions and Co-morbidities  Goal: Patient's chronic conditions and co-morbidity symptoms are monitored and  maintained or improved  Outcome: Progressing     Problem: Nutrition  Goal: Nutrient intake appropriate for maintaining nutritional needs  Outcome: Progressing     Problem: Skin  Goal: Decreased wound size/increased tissue granulation at next dressing change  Outcome: Progressing  Goal: Participates in plan/prevention/treatment measures  Outcome: Progressing  Goal: Prevent/manage excess moisture  Outcome: Progressing  Goal: Prevent/minimize sheer/friction injuries  Outcome: Progressing  Goal: Promote/optimize nutrition  Outcome: Progressing  Goal: Promote skin healing  Outcome: Progressing   .

## 2025-08-11 NOTE — PROGRESS NOTES
Physical Therapy    Physical Therapy Treatment    Patient Name: Kolton Acosta  MRN: [id number]  Department: [address]  Room: [facility]405-A  Today's Date: 8/11/2025  Time Calculation  Start Time: 1103  Stop Time: 1131  Time Calculation (min): 28 min         Assessment/Plan   PT Assessment  PT Assessment Results: Decreased strength, Decreased endurance, Decreased mobility, Impaired balance, Decreased coordination, Decreased cognition, Impaired judgement, Decreased safety awareness, Impaired vision, Impaired hearing, Impaired sensation, Impaired tone, Decreased skin integrity, Pain  Rehab Prognosis: Good  Barriers to Discharge Home: Physical needs  Physical Needs: 24hr mobility assistance needed, 24hr ADL assistance needed, High falls risk due to function or environment  Evaluation/Treatment Tolerance: Patient limited by fatigue  Medical Staff Made Aware: Yes  Strengths: Support of Caregivers  Barriers to Participation: Comorbidities  End of Session Communication: Bedside nurse  Assessment Comment: The patient continues to require modAx2 to maxAx2 for bed mobility and transfers; pt would continue to benefit from skilled therapy services to address functional deficits.  End of Session Patient Position: Bed, 3 rail up, Alarm on  PT Plan  Inpatient/Swing Bed or Outpatient: Inpatient  PT Plan  Treatment/Interventions: Bed mobility, Transfer training, Gait training, Balance training, Neuromuscular re-education, Strengthening, Endurance training, Range of motion, Therapeutic exercise, Therapeutic activity, Home exercise program  PT Plan: Ongoing PT  PT Frequency: 3 times per week (during this acute inpatient hospitalization)  PT Discharge Recommendations: Moderate intensity level of continued care (Based on current functional status and rehab potential, patient is anticipated to tolerate and benefit from 5 or more days per week of skilled rehabilitative therapy after discharge from this acute inpatient hospitalization.)  Equipment  Recommended upon Discharge: Wheeled walker  PT Recommended Transfer Status: Assist x2  PT - OK to Discharge: Yes    PT Visit Info:  PT Received On: 08/11/25  Response to Previous Treatment: Patient with no complaints from previous session.     General Visit Information:   General  Family/Caregiver Present: No  Co-Treatment: OT  Co-Treatment Reason: To optimize pt safety and outcomes  Prior to Session Communication: Bedside nurse  Patient Position Received: Bed, 3 rail up, Alarm on  Preferred Learning Style: verbal, written, auditory  General Comment: Pt is a 89 yo male admitted to the hospital with weakness. Pt apparently has also had difficulty feeding himself for the past week.    Subjective   Precautions:  Precautions  Hearing/Visual Limitations: h/o glaucoma, very [place]  Medical Precautions: Fall precautions  Precautions Comment: h/o recent falls      Vital Signs Comment: HR 80s with mobility     Objective   Pain:  Pain Assessment  Pain Assessment: 0-10  0-10 (Numeric) Pain Score: 0 - No pain  Cognition:  Cognition  Overall Cognitive Status: Impaired  Orientation Level: Disoriented to time, Disoriented to place  Following Commands: Follows one step commands with repetition  Safety Judgment: Decreased awareness of need for assistance  Cognition Comments: pleasantly confused  Processing Speed: Delayed  Coordination:  Coordination Comment: decreased accuracy of fine and gross motor movement UE and LE  Postural Control:  Postural Control  Posture Comment: forward head; rounded shoulders  Static Sitting Balance  Static Sitting-Balance Support: Feet supported, Bilateral upper extremity supported  Static Sitting-Level of Assistance: Minimum assistance  Static Sitting-Comment/Number of Minutes: intermittent retrolean  Static Standing Balance  Static Standing-Balance Support: Bilateral upper extremity supported (RW)  Static Standing-Level of Assistance: Moderate assistance, Maximum assistance (x2)  Static  "Standing-Comment/Number of Minutes: very forward flexed posture; VCs for upright posture  Extremity/Trunk Assessments:  RLE   RLE :  (grossly 3-3+/5)  LLE   LLE :  (grossly 3-3+/5)  Activity Tolerance:  Activity Tolerance  Endurance: Decreased tolerance for upright activites  Activity Tolerance Comments: limited due to generalized weakness; pt confused throughout session stating \"I don't think I'm in a [place]\"  Rate of Perceived Exertion (RPE): 5/10  Treatments:  Therapeutic Activity  Therapeutic Activity Performed: Yes  Therapeutic Activity 1: Two trials of static standing to braced RW; approx. 30 sec to 1 min in length; elisa soft knees; unable to take steps with maxAx2    Bed Mobility  Bed Mobility: Yes  Bed Mobility 1  Bed Mobility 1: Supine to sitting  Level of Assistance 1: Maximum assistance  Bed Mobility Comments 1: pt able to initiate moving BLE to EOB with cueing; maxAx2 for completion of transfer to bedside seated position with assist for trunk elevation and moving BLE to EOB  Bed Mobility 2  Bed Mobility  2: Sitting to supine  Level of Assistance 2: Maximum assistance, +2  Bed Mobility Comments 2: assist with all aspects of returning to supine with maxAx2; pt boosted and repositioned for comfort    Ambulation/Gait Training  Ambulation/Gait Training Performed: No (completed lateral weight shifting in standing with maxAx2 with RW for pre-gait activities; unable to take steps during this session; elisa knees soft)  Transfers  Transfer: Yes  Transfer 1  Transfer From 1: Sit to, Stand to  Transfer to 1: Stand, Sit  Technique 1: Sit to stand, Stand to sit  Transfer Device 1: Walker  Transfer Level of Assistance 1: Maximum assistance, +2  Trials/Comments 1: two trials of sit <> stand completed with maxAx2; pt pulling up on braced RW; VCs for safe sequencing    Outcome Measures:  [facility] Basic Mobility  Turning from your back to your side while in a flat bed without using bedrails: A lot  Moving from lying on " your back to sitting on the side of a flat bed without using bedrails: A lot  Moving to and from bed to chair (including a wheelchair): A lot  Standing up from a chair using your arms (e.g. wheelchair or bedside chair): Total  To walk in hospital room: Total  Climbing 3-5 steps with railing: Total  Basic Mobility - Total Score: 9    Education Documentation  Mobility Training, taught by Ankita Saeed PT at 8/11/2025 12:57 PM.  Learner: Patient  Readiness: Acceptance  Method: Explanation  Response: Needs Reinforcement  Comment: reviewed PT POC    Education Comments  No comments found.      Encounter Problems       Encounter Problems (Active)       PT Problem       Strength (Progressing)       Start:  08/08/25    Expected End:  09/08/25       The patient will demonstrate an overall strength of 4/5 in BLE to assist with completion of functional mobility.           Functional Mobility (Progressing)       Start:  08/08/25    Expected End:  09/08/25       The patient will complete functional mobility (bed mobility, transfers, etc.) at a close supervision level with LRAD by DC.           Ambulation (Progressing)       Start:  08/08/25    Expected End:  09/08/25       The patient will be able to ambulate at a close supervision level for >30ftx1 with RW.          Balance (Progressing)       Start:  08/08/25    Expected End:  09/08/25       The patient will demonstrate good dynamic standing balance during activity with LRAD.

## 2025-08-11 NOTE — CARE PLAN
The patient's goals for the shift include      The clinical goals for the shift include Maintain comfort and safety    Over the shift, the patient did not make progress toward the following goals. Barriers to progression include . Recommendations to address these barriers include   Problem: Skin  Goal: Decreased wound size/increased tissue granulation at next dressing change  Outcome: Progressing  Goal: Participates in plan/prevention/treatment measures  Outcome: Progressing  Goal: Prevent/manage excess moisture  8/10/2025 2335 by Soraya Her RN  Flowsheets (Taken 8/10/2025 2335)  Prevent/manage excess moisture:   Moisturize dry skin   Cleanse incontinence/protect with barrier cream  8/10/2025 2335 by Soraya Her RN  Outcome: Progressing  Flowsheets (Taken 8/10/2025 2335)  Prevent/manage excess moisture:   Moisturize dry skin   Cleanse incontinence/protect with barrier cream  Goal: Prevent/minimize sheer/friction injuries  Outcome: Progressing  Goal: Promote/optimize nutrition  Outcome: Progressing  Goal: Promote skin healing  Outcome: Progressing   .

## 2025-08-11 NOTE — PROGRESS NOTES
"Kolton Acosta is a 89 y.o. male on day 0 of admission presenting with Weakness.    Subjective   Patient resting in bed. Oriented to person and time. Re-oriented to place. Denies chest pain, shortness of breath, abdominal pain.        Objective     Physical Exam  Constitutional:       Appearance: Normal appearance.   HENT:      Head: Normocephalic and atraumatic.      Mouth/Throat:      Mouth: Mucous membranes are moist.      Pharynx: Oropharynx is clear.     Eyes:      Extraocular Movements: Extraocular movements intact.      Conjunctiva/sclera: Conjunctivae normal.      Pupils: Pupils are equal, round, and reactive to light.       Cardiovascular:      Rate and Rhythm: Normal rate and regular rhythm.      Pulses: Normal pulses.      Heart sounds: Normal heart sounds.   Pulmonary:      Effort: Pulmonary effort is normal.      Breath sounds: Normal breath sounds.   Abdominal:      Palpations: Abdomen is soft.      Tenderness: There is no abdominal tenderness.     Musculoskeletal:         General: Normal range of motion.      Cervical back: Normal range of motion and neck supple.     Skin:     General: Skin is warm and dry.      Capillary Refill: Capillary refill takes less than 2 seconds.     Neurological:      General: No focal deficit present.      Mental Status: He is alert and oriented to person, place, and time.     Psychiatric:         Mood and Affect: Mood normal.         Behavior: Behavior normal.         Last Recorded Vitals  Blood pressure 100/55, pulse 74, temperature 36.5 °C (97.7 °F), temperature source Oral, resp. rate 18, height 1.803 m (5' 11\"), weight 62.3 kg (137 lb 6.4 oz), SpO2 97%.  Intake/Output last 3 Shifts:  I/O last 3 completed shifts:  In: 720 (11.8 mL/kg) [P.O.:720]  Out: - (0 mL/kg)   Weight: 60.8 kg     Relevant Results  Results for orders placed or performed during the hospital encounter of 08/07/25 (from the past 24 hours)   POCT GLUCOSE   Result Value Ref Range    POCT Glucose 290 (H) " 74 - 99 mg/dL   POCT GLUCOSE   Result Value Ref Range    POCT Glucose 124 (H) 74 - 99 mg/dL   Basic Metabolic Panel   Result Value Ref Range    Glucose 102 (H) 74 - 99 mg/dL    Sodium 135 (L) 136 - 145 mmol/L    Potassium 3.2 (L) 3.5 - 5.3 mmol/L    Chloride 99 98 - 107 mmol/L    Bicarbonate 30 21 - 32 mmol/L    Anion Gap 9 (L) 10 - 20 mmol/L    Urea Nitrogen 31 (H) 6 - 23 mg/dL    Creatinine 0.83 0.50 - 1.30 mg/dL    eGFR 79 >60 mL/min/1.73m*2    Calcium 9.5 8.6 - 10.3 mg/dL   CBC   Result Value Ref Range    WBC 7.1 4.4 - 11.3 x10*3/uL    nRBC 0.0 0.0 - 0.0 /100 WBCs    RBC 4.68 4.50 - 5.90 x10*6/uL    Hemoglobin 14.6 13.5 - 17.5 g/dL    Hematocrit 44.1 41.0 - 52.0 %    MCV 94 80 - 100 fL    MCH 31.2 26.0 - 34.0 pg    MCHC 33.1 32.0 - 36.0 g/dL    RDW 14.6 (H) 11.5 - 14.5 %    Platelets 184 150 - 450 x10*3/uL           Malnutrition Diagnosis Status: New  Malnutrition Diagnosis: Severe malnutrition related to chronic disease or condition  Related to: decreased ability to consume/tolerate sufficient energy  As Evidenced by: po intake </= 75% estimated needs for >/= 1 month and severe subcutaneous fat loss and muscle wasting  I agree with the dietitian's malnutrition diagnosis.      Assessment & Plan  Weakness    Right-sided weakness  Rule out TIA/CVA  Noted right upper and lower extremity weakness and poor coordination  Monitor on telemetry  Eliquis resumed, stop ASA, continue statin  lipid panel in AM  MRI/MRA showed possible acute vs early subacute infarct in the right parietal lobe  US carotid with <50% stenosis B/L  Echocardiogram showed normal EF  neurostroke assessment  consult neurology, appreciate recommendations-reviewed MRI, unlikely CVA    Hypertension  Not on any antihypertensives  Monitor blood pressure    Hyperlipidemia  Statin    Glaucoma  Resume home eyedrops    History of aortic valve stenosis status post TAVR  On Eliquis    Plan  PT/OT  DVT prophylaxis  Fall precautions  Patient is medically stable.  Discharge to SNF when arrangements can be made. Awaiting acceptance, then will need Precert         Sydnee Gomez, IRINA-CNP

## 2025-08-12 ENCOUNTER — APPOINTMENT (OUTPATIENT)
Dept: RADIOLOGY | Facility: HOSPITAL | Age: OVER 89
End: 2025-08-12
Payer: MEDICARE

## 2025-08-12 LAB
ANION GAP SERPL CALCULATED.3IONS-SCNC: 11 MMOL/L (ref 10–20)
BUN SERPL-MCNC: 40 MG/DL (ref 6–23)
CALCIUM SERPL-MCNC: 9.5 MG/DL (ref 8.6–10.3)
CHLORIDE SERPL-SCNC: 100 MMOL/L (ref 98–107)
CO2 SERPL-SCNC: 28 MMOL/L (ref 21–32)
CREAT SERPL-MCNC: 0.9 MG/DL (ref 0.5–1.3)
EGFRCR SERPLBLD CKD-EPI 2021: 77 ML/MIN/1.73M*2
ERYTHROCYTE [DISTWIDTH] IN BLOOD BY AUTOMATED COUNT: 14.4 % (ref 11.5–14.5)
GLUCOSE SERPL-MCNC: 90 MG/DL (ref 74–99)
HCT VFR BLD AUTO: 42.1 % (ref 41–52)
HGB BLD-MCNC: 14 G/DL (ref 13.5–17.5)
MCH RBC QN AUTO: 31 PG (ref 26–34)
MCHC RBC AUTO-ENTMCNC: 33.3 G/DL (ref 32–36)
MCV RBC AUTO: 93 FL (ref 80–100)
NRBC BLD-RTO: 0 /100 WBCS (ref 0–0)
PLATELET # BLD AUTO: 178 X10*3/UL (ref 150–450)
POTASSIUM SERPL-SCNC: 3.7 MMOL/L (ref 3.5–5.3)
RBC # BLD AUTO: 4.51 X10*6/UL (ref 4.5–5.9)
SODIUM SERPL-SCNC: 135 MMOL/L (ref 136–145)
WBC # BLD AUTO: 7.4 X10*3/UL (ref 4.4–11.3)

## 2025-08-12 PROCEDURE — 80048 BASIC METABOLIC PNL TOTAL CA: CPT | Performed by: NURSE PRACTITIONER

## 2025-08-12 PROCEDURE — 36415 COLL VENOUS BLD VENIPUNCTURE: CPT | Performed by: NURSE PRACTITIONER

## 2025-08-12 PROCEDURE — 72125 CT NECK SPINE W/O DYE: CPT | Performed by: RADIOLOGY

## 2025-08-12 PROCEDURE — G0378 HOSPITAL OBSERVATION PER HR: HCPCS

## 2025-08-12 PROCEDURE — 2500000001 HC RX 250 WO HCPCS SELF ADMINISTERED DRUGS (ALT 637 FOR MEDICARE OP): Performed by: NURSE PRACTITIONER

## 2025-08-12 PROCEDURE — 99232 SBSQ HOSP IP/OBS MODERATE 35: CPT | Performed by: NURSE PRACTITIONER

## 2025-08-12 PROCEDURE — 97530 THERAPEUTIC ACTIVITIES: CPT | Mod: GO,CO

## 2025-08-12 PROCEDURE — 72125 CT NECK SPINE W/O DYE: CPT

## 2025-08-12 PROCEDURE — 73120 X-RAY EXAM OF HAND: CPT | Mod: RT

## 2025-08-12 PROCEDURE — 2500000001 HC RX 250 WO HCPCS SELF ADMINISTERED DRUGS (ALT 637 FOR MEDICARE OP): Performed by: STUDENT IN AN ORGANIZED HEALTH CARE EDUCATION/TRAINING PROGRAM

## 2025-08-12 PROCEDURE — 97535 SELF CARE MNGMENT TRAINING: CPT | Mod: GO,CO

## 2025-08-12 PROCEDURE — 85027 COMPLETE CBC AUTOMATED: CPT | Performed by: NURSE PRACTITIONER

## 2025-08-12 RX ORDER — ACETAMINOPHEN 500 MG
1000 TABLET ORAL EVERY 8 HOURS
Status: DISCONTINUED | OUTPATIENT
Start: 2025-08-12 | End: 2025-08-13 | Stop reason: HOSPADM

## 2025-08-12 RX ADMIN — ACETAMINOPHEN 1000 MG: 500 TABLET ORAL at 15:24

## 2025-08-12 RX ADMIN — APIXABAN 2.5 MG: 2.5 TABLET, FILM COATED ORAL at 07:42

## 2025-08-12 RX ADMIN — APIXABAN 2.5 MG: 2.5 TABLET, FILM COATED ORAL at 20:17

## 2025-08-12 RX ADMIN — ATORVASTATIN CALCIUM 40 MG: 40 TABLET, FILM COATED ORAL at 20:17

## 2025-08-12 RX ADMIN — Medication 5 MG: at 20:17

## 2025-08-12 RX ADMIN — TIMOLOL MALEATE 1 DROP: 2.5 SOLUTION/ DROPS OPHTHALMIC at 07:43

## 2025-08-12 ASSESSMENT — COGNITIVE AND FUNCTIONAL STATUS - GENERAL
CLIMB 3 TO 5 STEPS WITH RAILING: TOTAL
MOVING FROM LYING ON BACK TO SITTING ON SIDE OF FLAT BED WITH BEDRAILS: A LOT
DRESSING REGULAR LOWER BODY CLOTHING: TOTAL
DRESSING REGULAR LOWER BODY CLOTHING: TOTAL
MOVING TO AND FROM BED TO CHAIR: TOTAL
DAILY ACTIVITIY SCORE: 6
TOILETING: TOTAL
STANDING UP FROM CHAIR USING ARMS: TOTAL
MOVING TO AND FROM BED TO CHAIR: TOTAL
WALKING IN HOSPITAL ROOM: TOTAL
TOILETING: TOTAL
PERSONAL GROOMING: A LOT
EATING MEALS: A LOT
HELP NEEDED FOR BATHING: A LOT
STANDING UP FROM CHAIR USING ARMS: TOTAL
DAILY ACTIVITIY SCORE: 10
WALKING IN HOSPITAL ROOM: TOTAL
PERSONAL GROOMING: TOTAL
TURNING FROM BACK TO SIDE WHILE IN FLAT BAD: A LOT
MOVING FROM LYING ON BACK TO SITTING ON SIDE OF FLAT BED WITH BEDRAILS: A LOT
TURNING FROM BACK TO SIDE WHILE IN FLAT BAD: A LOT
CLIMB 3 TO 5 STEPS WITH RAILING: TOTAL
DRESSING REGULAR UPPER BODY CLOTHING: TOTAL
DRESSING REGULAR UPPER BODY CLOTHING: A LOT
MOBILITY SCORE: 8
EATING MEALS: TOTAL
HELP NEEDED FOR BATHING: TOTAL
MOBILITY SCORE: 8

## 2025-08-12 ASSESSMENT — PAIN - FUNCTIONAL ASSESSMENT
PAIN_FUNCTIONAL_ASSESSMENT: 0-10
PAIN_FUNCTIONAL_ASSESSMENT: 0-10

## 2025-08-12 ASSESSMENT — ACTIVITIES OF DAILY LIVING (ADL): HOME_MANAGEMENT_TIME_ENTRY: 25

## 2025-08-12 ASSESSMENT — PAIN SCALES - GENERAL
PAINLEVEL_OUTOF10: 0 - NO PAIN

## 2025-08-12 NOTE — ASSESSMENT & PLAN NOTE
Right-sided weakness  Rule out TIA/CVA  Noted right upper and lower extremity weakness and poor coordination  Monitor on telemetry  Eliquis resumed, stop ASA, continue statin  lipid panel stable   MRI/MRA showed possible acute vs early subacute infarct in the right parietal lobe  US carotid with <50% stenosis B/L  Echocardiogram showed normal EF  neurostroke assessment  consult neurology, appreciate recommendations-reviewed MRI, unlikely CVA  MRA head and neck with no large vessel stenosis     Right hand pain   Pain seems to be causing weakness in right hand at this time   X-ray shows no acute fracture   CT C-spine with no acute fracture, spinal stenosis.   Schedule Tylenol for pain     Hypertension  Not on any antihypertensives  Monitor blood pressure    Hyperlipidemia  Statin    Glaucoma  Resume home eyedrops    History of aortic valve stenosis status post TAVR  On Eliquis    Plan  PT/OT  DVT prophylaxis - Eliquis   Fall precautions  Plan for discharge to SNF - Precert completed, tentative plan for discharge tomorrow

## 2025-08-12 NOTE — CARE PLAN
The patient's goals for the shift include      The clinical goals for the shift include rest    Over the shift, the patient did not make progress toward the following goals. Barriers to progression include . Recommendations to address these barriers include   Problem: General Stroke  Goal: Establish a mutual long term goal with patient by discharge  Outcome: Progressing  Goal: Demonstrate improvement in neurological exam throughout the shift  Outcome: Progressing  Goal: Maintain BP within ordered limits throughout shift  Outcome: Progressing  Goal: Participate in treatment (ie., meds, therapy) throughout shift  Outcome: Progressing  Goal: No symptoms of aspiration throughout shift  Outcome: Progressing  Goal: No symptoms of hemorrhage throughout shift  Outcome: Progressing  Goal: Tolerate enteral feeding throughout shift  Outcome: Progressing  Goal: Decreased nausea/vomiting throughout shift  Outcome: Progressing  Goal: Controlled blood glucose throughout shift  Outcome: Progressing  Goal: Out of bed three times today  Outcome: Progressing     Problem: ICU Stroke  Goal: Maintain ICP within ordered limits throughout shift  Outcome: Progressing  Goal: Tolerate EVD clamping trial throughout shift  Outcome: Progressing  Goal: Tolerate ventilator weaning trial during shift  Outcome: Progressing  Goal: Maintain patent airway throughout shift  Outcome: Progressing  Goal: Achieve/maintain targeted sodium level throughout shift  Outcome: Progressing     Problem: Pain - Adult  Goal: Verbalizes/displays adequate comfort level or baseline comfort level  Outcome: Progressing     Problem: Safety - Adult  Goal: Free from fall injury  Outcome: Progressing     Problem: Discharge Planning  Goal: Discharge to home or other facility with appropriate resources  Outcome: Progressing     Problem: Chronic Conditions and Co-morbidities  Goal: Patient's chronic conditions and co-morbidity symptoms are monitored and maintained or  improved  Outcome: Progressing     Problem: Nutrition  Goal: Nutrient intake appropriate for maintaining nutritional needs  Outcome: Progressing     Problem: Skin  Goal: Decreased wound size/increased tissue granulation at next dressing change  Outcome: Progressing  Goal: Participates in plan/prevention/treatment measures  Outcome: Progressing  Goal: Prevent/manage excess moisture  Outcome: Progressing  Goal: Prevent/minimize sheer/friction injuries  Outcome: Progressing  Goal: Promote/optimize nutrition  Outcome: Progressing  Goal: Promote skin healing  Outcome: Progressing   .

## 2025-08-12 NOTE — PROGRESS NOTES
"Kolton Acosta is a 89 y.o. male on day 0 of admission presenting with Weakness.    Subjective   Patient resting in bed. Continues to have pain in right hand. Denies chest pain, shortness of breath. Easily re-oriented to time and place        Objective     Physical Exam  Constitutional:       Appearance: Normal appearance.   HENT:      Head: Normocephalic and atraumatic.      Mouth/Throat:      Mouth: Mucous membranes are moist.      Pharynx: Oropharynx is clear.     Eyes:      Extraocular Movements: Extraocular movements intact.      Conjunctiva/sclera: Conjunctivae normal.      Pupils: Pupils are equal, round, and reactive to light.       Cardiovascular:      Rate and Rhythm: Normal rate and regular rhythm.      Pulses: Normal pulses.      Heart sounds: Normal heart sounds.   Pulmonary:      Effort: Pulmonary effort is normal.      Breath sounds: Normal breath sounds.   Abdominal:      Palpations: Abdomen is soft.      Tenderness: There is no abdominal tenderness.     Musculoskeletal:      Cervical back: Normal range of motion and neck supple.      Comments: Right hand pain seems to restrict ability to grasp.      Skin:     General: Skin is warm and dry.      Capillary Refill: Capillary refill takes less than 2 seconds.     Neurological:      General: No focal deficit present.      Mental Status: He is alert. He is disoriented.     Psychiatric:         Mood and Affect: Mood normal.         Behavior: Behavior normal.         Last Recorded Vitals  Blood pressure 113/69, pulse 70, temperature 36.7 °C (98.1 °F), temperature source Oral, resp. rate 18, height 1.803 m (5' 11\"), weight 62.3 kg (137 lb 6.4 oz), SpO2 96%.  Intake/Output last 3 Shifts:  I/O last 3 completed shifts:  In: 480 (7.7 mL/kg) [P.O.:480]  Out: - (0 mL/kg)   Weight: 62.3 kg     Relevant Results  Results for orders placed or performed during the hospital encounter of 08/07/25 (from the past 24 hours)   POCT GLUCOSE   Result Value Ref Range    POCT " Glucose 106 (H) 74 - 99 mg/dL   Basic Metabolic Panel   Result Value Ref Range    Glucose 90 74 - 99 mg/dL    Sodium 135 (L) 136 - 145 mmol/L    Potassium 3.7 3.5 - 5.3 mmol/L    Chloride 100 98 - 107 mmol/L    Bicarbonate 28 21 - 32 mmol/L    Anion Gap 11 10 - 20 mmol/L    Urea Nitrogen 40 (H) 6 - 23 mg/dL    Creatinine 0.90 0.50 - 1.30 mg/dL    eGFR 77 >60 mL/min/1.73m*2    Calcium 9.5 8.6 - 10.3 mg/dL   CBC   Result Value Ref Range    WBC 7.4 4.4 - 11.3 x10*3/uL    nRBC 0.0 0.0 - 0.0 /100 WBCs    RBC 4.51 4.50 - 5.90 x10*6/uL    Hemoglobin 14.0 13.5 - 17.5 g/dL    Hematocrit 42.1 41.0 - 52.0 %    MCV 93 80 - 100 fL    MCH 31.0 26.0 - 34.0 pg    MCHC 33.3 32.0 - 36.0 g/dL    RDW 14.4 11.5 - 14.5 %    Platelets 178 150 - 450 x10*3/uL     CT cervical spine wo IV contrast  Result Date: 8/12/2025  Interpreted By:  Rakesh Means, STUDY: CT CERVICAL SPINE WO IV CONTRAST;  8/12/2025 1:37 pm   INDICATION: Fall and injury   COMPARISON: None.   ACCESSION NUMBER(S): [id number]   ORDERING CLINICIAN: JEANNETTE FLOR   TECHNIQUE: Transaxial images with orthogonal reconstructions   FINDINGS: VERTEBRAL ALIGNMENT: Within normal limits.   CRANIOCERVICAL JUNCTION: Unremarkable   BONY STRUCTURES: The bony structures appear osteopenic but otherwise intact.   THE CERVICAL LEVELS INCLUDING DISC SPACES, APOPHYSEAL AND UNCOVERTEBRAL JOINTS:  There is mild spondylolysis with mild anterolisthesis the C6-7 C7-T1 levels. There is moderate multilevel apophyseal hypertrophy greater on the left at the C3-4 and C4-5 levels and probably with partial ankylosis. Calcification at the ligamentum flavum at the C2-3 level slightly indents the posterior aspect of the cord..   ADDITIONAL FINDINGS: Moderate atherosclerotic calcification is noted at the carotid bifurcations.       Mild spondylosis and osteopenia. Otherwise no acute findings.   Signed by: Rakesh Means 8/12/2025 2:13 PM Dictation workstation:   [id number]    XR hand right 1-2 views  Result  Date: 8/12/2025  Interpreted By:  Renea Dc, STUDY: XR HAND RIGHT 1-2 VIEWS 8/12/2025 11:19 am   INDICATION: Signs/Symptoms:pain   COMPARISON: None available.   ACCESSION NUMBER(S): [id number]   ORDERING CLINICIAN: JEANNETTE FLOR   TECHNIQUE: Three views of right hand   FINDINGS: Osteoarthritis of the interphalangeal joint of the right thumb is noted with mild joint space narrowing and osteophytosis. There is also osteoarthritis affecting the PIP and DIP joints of the index finger as well as the DIP joints of the middle, ring, and little fingers. There is mild osteoarthritis of the 1st CMC joint with joint space narrowing and osteophytosis. The radiocarpal joint space is narrowed with calcification of the triangular fibrocartilage.   No fracture or dislocation of right hand is seen.       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   Radiocarpal joint space narrowing.   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   Signed by: Renea Dc 8/12/2025 11:51 AM Dictation workstation:   [id number]      Assessment & Plan  Weakness    Right-sided weakness  Rule out TIA/CVA  Noted right upper and lower extremity weakness and poor coordination  Monitor on telemetry  Eliquis resumed, stop ASA, continue statin  lipid panel stable   MRI/MRA showed possible acute vs early subacute infarct in the right parietal lobe  US carotid with <50% stenosis B/L  Echocardiogram showed normal EF  neurostroke assessment  consult neurology, appreciate recommendations-reviewed MRI, unlikely CVA  MRA head and neck with no large vessel stenosis     Right hand pain   Pain seems to be causing weakness in right hand at this time   X-ray shows no acute fracture   CT C-spine with no acute fracture, spinal stenosis.   Schedule Tylenol for pain      Hypertension  Not on any antihypertensives  Monitor blood pressure    Hyperlipidemia  Statin    Glaucoma  Resume home eyedrops    History of aortic valve stenosis status post TAVR  On Eliquis    Plan  PT/OT  DVT prophylaxis - Eliquis   Fall precautions  Plan for discharge to SNF - Precert completed, tentative plan for discharge tomorrow         Sydnee Gomez, APRN-CNP

## 2025-08-12 NOTE — CARE PLAN
The patient's goals for the shift include      The clinical goals for the shift include rest    Problem: General Stroke  Goal: Establish a mutual long term goal with patient by discharge  Outcome: Progressing  Goal: Demonstrate improvement in neurological exam throughout the shift  Outcome: Progressing  Goal: Maintain BP within ordered limits throughout shift  Outcome: Progressing  Goal: Participate in treatment (ie., meds, therapy) throughout shift  Outcome: Progressing  Goal: No symptoms of aspiration throughout shift  Outcome: Progressing  Goal: No symptoms of hemorrhage throughout shift  Outcome: Progressing  Goal: Tolerate enteral feeding throughout shift  Outcome: Progressing  Goal: Decreased nausea/vomiting throughout shift  Outcome: Progressing  Goal: Controlled blood glucose throughout shift  Outcome: Progressing  Goal: Out of bed three times today  Outcome: Progressing     Problem: ICU Stroke  Goal: Maintain ICP within ordered limits throughout shift  Outcome: Progressing  Goal: Tolerate EVD clamping trial throughout shift  Outcome: Progressing  Goal: Tolerate ventilator weaning trial during shift  Outcome: Progressing  Goal: Maintain patent airway throughout shift  Outcome: Progressing  Goal: Achieve/maintain targeted sodium level throughout shift  Outcome: Progressing     Problem: Pain - Adult  Goal: Verbalizes/displays adequate comfort level or baseline comfort level  Outcome: Progressing     Problem: Safety - Adult  Goal: Free from fall injury  Outcome: Progressing     Problem: Discharge Planning  Goal: Discharge to home or other facility with appropriate resources  Outcome: Progressing     Problem: Chronic Conditions and Co-morbidities  Goal: Patient's chronic conditions and co-morbidity symptoms are monitored and maintained or improved  Outcome: Progressing     Problem: Nutrition  Goal: Nutrient intake appropriate for maintaining nutritional needs  Outcome: Progressing     Problem: Skin  Goal:  Decreased wound size/increased tissue granulation at next dressing change  Outcome: Progressing  Goal: Participates in plan/prevention/treatment measures  Outcome: Progressing  Goal: Prevent/manage excess moisture  Outcome: Progressing  Goal: Prevent/minimize sheer/friction injuries  Outcome: Progressing  Goal: Promote/optimize nutrition  Outcome: Progressing  Goal: Promote skin healing  Outcome: Progressing

## 2025-08-12 NOTE — PROGRESS NOTES
Occupational Therapy    OT Treatment    Patient Name: Kolton Acosta  MRN: [id number]  Department: [address]  Room: [address] [place], [place] [address]  Today's Date: 8/12/2025  Time Calculation  Start Time: 1625  Stop Time: 1704  Time Calculation (min): 39 min        Assessment:  OT Assessment: Pt very motivated slow progress to balance, transfers, grooming following POC. Pt requiring 2 person for safety in transfers.   Will continue to address remaining deficits with skilled OT intervention .  Prognosis: Fair  Barriers to Discharge Home: Cognition needs  Caregiver Assistance: Caregiver assistance needed per identified barriers - however, level of patient's required assistance exceeds assistance available at home  Cognition Needs: 24hr supervision for safety awareness needed, Medication and/or medical management daily assist needed, Recollection or understanding of home exercise program limited, Insight of patient limited regarding functional ability/needs, Cognition-related high falls risk  Physical Needs: Ambulating household distances limited by function/safety, 24hr mobility assistance needed, 24hr ADL assistance needed, High falls risk due to function or environment  Evaluation/Treatment Tolerance: Patient limited by fatigue  Medical Staff Made Aware: Yes  End of Session Communication: Bedside nurse  End of Session Patient Position: Bed, 3 rail up, Alarm on, Caregiver's arms (call light in reach all needs met daughter present)  OT Assessment Results: Decreased ADL status, Decreased upper extremity range of motion, Decreased upper extremity strength, Decreased safe judgment during ADL, Decreased cognition, Decreased endurance, Decreased sensation, Visual deficit, Decreased fine motor control, Decreased functional mobility, Decreased gross motor control, Decreased trunk control for functional activities  Prognosis: Fair  Barriers to Discharge: Decreased caregiver support  Evaluation/Treatment Tolerance: Patient limited by fatigue  Medical  Staff Made Aware: Yes  Strengths: Support of Caregivers  Barriers to Participation: Comorbidities  Plan:  Treatment Interventions: ADL retraining, Functional transfer training, Endurance training, Patient/family training, Equipment evaluation/education, Compensatory technique education  OT Frequency: 4 times per week (during this acute inpatient hospitalization)  OT Discharge Recommendations: Moderate intensity level of continued care, 24 hr supervision due to cognition (Based on current functional status and rehab potential, patient is anticipated to tolerate and benefit from 5 or more days per week of skilled rehabilitative therapy after discharge from this acute inpatient hospitalization.)  Equipment Recommended upon Discharge: Wheeled walker  OT Recommended Transfer Status: Assist of 2  OT - OK to Discharge: Yes  Treatment Interventions: ADL retraining, Functional transfer training, Endurance training, Patient/family training, Equipment evaluation/education, Compensatory technique education    Subjective   OT Visit Info:  OT Received On: 08/12/25  General Visit Info:  General  Reason for Referral: weakness, impaired ADL's/mobility  Referred By: Aure Mayers, APRN-CNP  Past Medical History Relevant to Rehab: anemia, HTN, glaucoma, neuropathy, clavicle fx, aortic valve stenosis s/p AVR, a-fib, chronic systolic HF, CAD, hyperlipidemia  Family/Caregiver Present: Yes  Caregiver Feedback: daughter very attentive and loving  Prior to Session Communication: Bedside nurse  Patient Position Received: Bed, 3 rail up, Alarm on  Preferred Learning Style: verbal, written, auditory  General Comment: Pt is a 89 yo male admitted to the hospital with weakness. Pt apparently has also had difficulty feeding himself for the past week..  Pt cleared by NSG and is agreeable to skilled OT intervention  Precautions:  Hearing/Visual Limitations: h/o glaucoma, VERY [place]  Medical Precautions: Fall precautions  Precautions Comment: h/o  recent falls            Pain:  Pain Assessment  Pain Assessment: 0-10  0-10 (Numeric) Pain Score: 0 - No pain    Objective    Cognition:  Cognition  Overall Cognitive Status: Impaired  Orientation Level: Disoriented to place, Disoriented to time  Following Commands: Follows one step commands with repetition  Safety Judgment: Decreased awareness of need for assistance  Problem Solving: Assistance required to identify errors made  Cognition Comments: pleasantly confused  Insight: Severe  Impulsive: Moderately  Processing Speed: Delayed  Coordination:  Movements are Fluid and Coordinated: No  Upper Body Coordination: poor gross/fine motor  Lower Body Coordination: slower rate of movement  Coordination Comment: decreased accuracy of fine and gross motor movement UE and LE  Activities of Daily Living: Feeding  Feeding Level of Assistance: Minimum assistance  Feeding Where Assessed: Bed level  Feeding Comments: hand in hand assist hold ensure Pt able to achieve to mouth straw, assist self feed remainder    Grooming  Grooming Level of Assistance: Minimum assistance  Grooming Where Assessed: Edge of bed  Grooming Comments: Pt washing face limited endurance  Bed Mobility/Transfers: Bed Mobility  Bed Mobility: Yes  Bed Mobility 1  Bed Mobility 1: Supine to sitting  Level of Assistance 1: Moderate assistance  Bed Mobility Comments 1: Pt advance BLE assist trunk up HOB 35' use side rail  Bed Mobility 2  Bed Mobility  2: Sitting to supine  Level of Assistance 2: Dependent  Bed Mobility Comments 2: assist BLE and  trunk  Bed Mobility 3  Bed Mobility 3: Scooting  Level of Assistance 3: Dependent  Bed Mobility Comments 3: draw sheet  2 person to adjust supine to HOB    Transfers  Transfer: Yes  Transfer 1  Transfer From 1: Bed to  Transfer to 1: Sit, Stand  Technique 1: Sit to stand, Stand to sit  Transfer Device 1: Walker, Gait belt  Transfer Level of Assistance 1: Maximum assistance, +2  Trials/Comments 1: 3 trials limited 10  seconds x 2 final stance 25 seconds Pt with full stance at edge of bed  Sitting Balance:  Static Sitting Balance  Static Sitting-Balance Support: Feet supported, Right upper extremity supported, Left upper extremity supported (alternating)  Static Sitting-Level of Assistance: Minimum assistance  Static Sitting-Comment/Number of Minutes: ~10 minutes with self care slight retropulsion cues Pt anble to self correct    Outcome Measures:[facility] Daily Activity  Putting on and taking off regular lower body clothing: Total  Bathing (including washing, rinsing, drying): A lot  Putting on and taking off regular upper body clothing: A lot  Toileting, which includes using toilet, bedpan or urinal: Total  Taking care of personal grooming such as brushing teeth: A lot  Eating Meals: A lot  Daily Activity - Total Score: 10    Education Documentation  Body Mechanics, taught by HARMONY Lane at 8/12/2025  5:15 PM.  Learner: Family, Patient  Readiness: Acceptance  Method: Explanation, Demonstration, Teach-back  Response: Verbalizes Understanding, Demonstrated Understanding, Needs Reinforcement  Comment: Instructed Pt iwth transfer safety, balance strategies, adaptive ADL skills, body mechanics    ADL Training, taught by HARMONY Lane at 8/12/2025  5:15 PM.  Learner: Family, Patient  Readiness: Acceptance  Method: Explanation, Demonstration, Teach-back  Response: Verbalizes Understanding, Demonstrated Understanding, Needs Reinforcement  Comment: Instructed Pt iwth transfer safety, balance strategies, adaptive ADL skills, body mechanics    Education Comments  No comments found.        OP EDUCATION:       Goals:  Encounter Problems       Encounter Problems (Active)       OT Goals       Pt will complete self-feeding with setup. (Progressing)       Start:  08/08/25    Expected End:  09/08/25            Pt will complete all grooming tasks with CGA in standing. (Progressing)       Start:  08/08/25    Expected End:  09/08/25             Pt will complete all toileting tasks with CGA. (Progressing)       Start:  08/08/25    Expected End:  09/08/25            Pt will complete all functional transfers and mobility with CGA while using a RW. (Progressing)       Start:  08/08/25    Expected End:  09/08/25

## 2025-08-13 VITALS
WEIGHT: 137.4 LBS | HEIGHT: 71 IN | BODY MASS INDEX: 19.23 KG/M2 | HEART RATE: 70 BPM | SYSTOLIC BLOOD PRESSURE: 132 MMHG | DIASTOLIC BLOOD PRESSURE: 97 MMHG | RESPIRATION RATE: 18 BRPM | OXYGEN SATURATION: 97 % | TEMPERATURE: 96.8 F

## 2025-08-13 LAB
ANION GAP SERPL CALCULATED.3IONS-SCNC: 11 MMOL/L (ref 10–20)
BUN SERPL-MCNC: 45 MG/DL (ref 6–23)
CALCIUM SERPL-MCNC: 9.3 MG/DL (ref 8.6–10.3)
CHLORIDE SERPL-SCNC: 98 MMOL/L (ref 98–107)
CO2 SERPL-SCNC: 29 MMOL/L (ref 21–32)
CREAT SERPL-MCNC: 0.94 MG/DL (ref 0.5–1.3)
EGFRCR SERPLBLD CKD-EPI 2021: 73 ML/MIN/1.73M*2
GLUCOSE SERPL-MCNC: 88 MG/DL (ref 74–99)
HOLD SPECIMEN: NORMAL
POTASSIUM SERPL-SCNC: 3.6 MMOL/L (ref 3.5–5.3)
SODIUM SERPL-SCNC: 134 MMOL/L (ref 136–145)

## 2025-08-13 PROCEDURE — 2500000001 HC RX 250 WO HCPCS SELF ADMINISTERED DRUGS (ALT 637 FOR MEDICARE OP): Performed by: NURSE PRACTITIONER

## 2025-08-13 PROCEDURE — G0378 HOSPITAL OBSERVATION PER HR: HCPCS

## 2025-08-13 PROCEDURE — 94760 N-INVAS EAR/PLS OXIMETRY 1: CPT

## 2025-08-13 PROCEDURE — 2500000001 HC RX 250 WO HCPCS SELF ADMINISTERED DRUGS (ALT 637 FOR MEDICARE OP): Performed by: STUDENT IN AN ORGANIZED HEALTH CARE EDUCATION/TRAINING PROGRAM

## 2025-08-13 PROCEDURE — 80048 BASIC METABOLIC PNL TOTAL CA: CPT | Performed by: NURSE PRACTITIONER

## 2025-08-13 PROCEDURE — 99239 HOSP IP/OBS DSCHRG MGMT >30: CPT | Performed by: NURSE PRACTITIONER

## 2025-08-13 PROCEDURE — 36415 COLL VENOUS BLD VENIPUNCTURE: CPT | Performed by: NURSE PRACTITIONER

## 2025-08-13 RX ORDER — ACETAMINOPHEN 500 MG
1000 TABLET ORAL EVERY 8 HOURS
Start: 2025-08-13

## 2025-08-13 RX ADMIN — ACETAMINOPHEN 1000 MG: 500 TABLET ORAL at 08:39

## 2025-08-13 RX ADMIN — APIXABAN 2.5 MG: 2.5 TABLET, FILM COATED ORAL at 08:39

## 2025-08-13 RX ADMIN — TIMOLOL MALEATE 1 DROP: 2.5 SOLUTION/ DROPS OPHTHALMIC at 08:39

## 2025-08-13 ASSESSMENT — PAIN SCALES - GENERAL: PAINLEVEL_OUTOF10: 0 - NO PAIN

## 2025-08-13 ASSESSMENT — COGNITIVE AND FUNCTIONAL STATUS - GENERAL
PERSONAL GROOMING: TOTAL
MOVING TO AND FROM BED TO CHAIR: TOTAL
EATING MEALS: TOTAL
CLIMB 3 TO 5 STEPS WITH RAILING: TOTAL
STANDING UP FROM CHAIR USING ARMS: TOTAL
DAILY ACTIVITIY SCORE: 6
MOBILITY SCORE: 8
WALKING IN HOSPITAL ROOM: TOTAL
MOVING FROM LYING ON BACK TO SITTING ON SIDE OF FLAT BED WITH BEDRAILS: A LOT
HELP NEEDED FOR BATHING: TOTAL
TURNING FROM BACK TO SIDE WHILE IN FLAT BAD: A LOT
DRESSING REGULAR UPPER BODY CLOTHING: TOTAL
DRESSING REGULAR LOWER BODY CLOTHING: TOTAL
TOILETING: TOTAL

## 2025-08-13 NOTE — PROGRESS NOTES
08/13/25 1225   Discharge Planning   Expected Discharge Disposition SNF  ([facility])   What day is the transport expected? 08/13/25   What time is the transport expected? 1430     PASSR completed  AVS and goldenrod sent to facility   Daughter Ki updated   RN made aware and given report number

## 2025-08-13 NOTE — CARE PLAN
The patient's goals for the shift include      The clinical goals for the shift include rest    Over the shift, the patient did not make progress toward the following goals. Barriers to progression include . Recommendations to address these barriers include .  Problem: Safety - Adult  Goal: Free from fall injury  Outcome: Progressing     Problem: Chronic Conditions and Co-morbidities  Goal: Patient's chronic conditions and co-morbidity symptoms are monitored and maintained or improved  Outcome: Progressing

## 2025-08-13 NOTE — CARE PLAN
The patient's goals for the shift include      The clinical goals for the shift include maintain saftey

## 2025-08-13 NOTE — DISCHARGE SUMMARY
Discharge Diagnosis  Weakness       Issues Requiring Follow-Up      Discharge Meds     Medication List      START taking these medications     acetaminophen 500 mg tablet; Commonly known as: Tylenol; Take 2 tablets   (1,000 mg) by mouth every 8 hours.   atorvastatin 40 mg tablet; Commonly known as: Lipitor; Take 1 tablet (40   mg) by mouth once daily at bedtime.   timolol 0.25 % ophthalmic solution; Commonly known as: Timoptic;   Administer 1 drop into both eyes 2 times a day.     CONTINUE taking these medications     apixaban 5 mg tablet; Commonly known as: Eliquis; Take 0.5 tablets (2.5   mg) by mouth 2 times a day.   cholecalciferol 50 mcg (2,000 units) tablet; Commonly known as: Vitamin   D-3   Vitamin B-12 1,000 mcg tablet; Generic drug: cyanocobalamin       Test Results Pending At Discharge  Pending Labs       Order Current Status    Extra Urine Gray Tube In process    Urinalysis with Reflex Culture and Microscopic In process    Extra Tubes Preliminary result    Lavender Top Preliminary result            Hospital Course   Presented to ER 8/7 with weakness. CT with no acute intracranial abnormality. MRI/MRA showed possible acute vs subacute infarct in right parietal lobe, but neurology reviewed and feels unlikely stroke. Even if small area is stroke, would not explain weakness. PT/OT consulted and recommending moderate intensity rehab at discharge.Patient had right hand pain interfering with ROM and hand grasp. X-ray of hand showed no fracture. CT scan of C-spine with no acute abnormalities, stenosis. Patient improved with scheduled Tylenol. Discharge to SNF for rehab.     Pertinent Physical Exam At Time of Discharge  Physical Exam  Constitutional:       Appearance: Normal appearance.   HENT:      Head: Normocephalic and atraumatic.      Mouth/Throat:      Mouth: Mucous membranes are moist.      Pharynx: Oropharynx is clear.     Eyes:      Extraocular Movements: Extraocular movements intact.       Conjunctiva/sclera: Conjunctivae normal.      Pupils: Pupils are equal, round, and reactive to light.       Cardiovascular:      Rate and Rhythm: Normal rate and regular rhythm.      Pulses: Normal pulses.      Heart sounds: Normal heart sounds.   Pulmonary:      Effort: Pulmonary effort is normal.      Breath sounds: Normal breath sounds.   Abdominal:      Palpations: Abdomen is soft.      Tenderness: There is no abdominal tenderness.     Musculoskeletal:         General: Normal range of motion.      Cervical back: Normal range of motion and neck supple.     Skin:     General: Skin is warm and dry.      Capillary Refill: Capillary refill takes less than 2 seconds.     Neurological:      General: No focal deficit present.      Mental Status: He is alert and oriented to person, place, and time.     Psychiatric:         Mood and Affect: Mood normal.         Behavior: Behavior normal.         Outpatient Follow-Up  Future Appointments   Date Time Provider Department Center   2/19/2026  3:30 PM Ajay Griffith MD [id number]         IRINA Amin-CNP

## 2025-08-17 ENCOUNTER — NURSING HOME VISIT (OUTPATIENT)
Dept: POST ACUTE CARE | Facility: EXTERNAL LOCATION | Age: OVER 89
End: 2025-08-17
Payer: MEDICARE

## 2025-08-17 DIAGNOSIS — D64.9 ANEMIA, UNSPECIFIED TYPE: ICD-10-CM

## 2025-08-17 DIAGNOSIS — G47.00 INSOMNIA, UNSPECIFIED TYPE: ICD-10-CM

## 2025-08-17 DIAGNOSIS — E78.5 HYPERLIPIDEMIA, UNSPECIFIED HYPERLIPIDEMIA TYPE: ICD-10-CM

## 2025-08-17 DIAGNOSIS — I50.9 HEART FAILURE, UNSPECIFIED HF CHRONICITY, UNSPECIFIED HEART FAILURE TYPE: ICD-10-CM

## 2025-08-17 DIAGNOSIS — R53.1 WEAKNESS: ICD-10-CM

## 2025-08-17 DIAGNOSIS — I48.91 ATRIAL FIBRILLATION, UNSPECIFIED TYPE (MULTI): Primary | ICD-10-CM

## 2025-08-17 DIAGNOSIS — I10 PRIMARY HYPERTENSION: ICD-10-CM

## 2025-08-17 DIAGNOSIS — H40.9 GLAUCOMA, UNSPECIFIED GLAUCOMA TYPE, UNSPECIFIED LATERALITY: ICD-10-CM

## 2025-08-17 DIAGNOSIS — I25.10 CORONARY ARTERY DISEASE, UNSPECIFIED VESSEL OR LESION TYPE, UNSPECIFIED WHETHER ANGINA PRESENT, UNSPECIFIED WHETHER NATIVE OR TRANSPLANTED HEART: ICD-10-CM

## 2025-08-17 DIAGNOSIS — G62.9 NEUROPATHY: ICD-10-CM

## 2025-08-17 DIAGNOSIS — Z91.81 AT RISK FOR FALLING: ICD-10-CM

## 2025-08-17 DIAGNOSIS — F41.9 ANXIETY: ICD-10-CM

## 2025-08-17 PROCEDURE — 99306 1ST NF CARE HIGH MDM 50: CPT | Performed by: INTERNAL MEDICINE

## 2025-08-23 VITALS
SYSTOLIC BLOOD PRESSURE: 128 MMHG | HEART RATE: 82 BPM | RESPIRATION RATE: 16 BRPM | TEMPERATURE: 98 F | DIASTOLIC BLOOD PRESSURE: 80 MMHG

## 2025-08-23 ASSESSMENT — ENCOUNTER SYMPTOMS
CHILLS: 0
FEVER: 0

## 2025-09-03 ENCOUNTER — TELEPHONE (OUTPATIENT)
Dept: PRIMARY CARE | Facility: CLINIC | Age: OVER 89
End: 2025-09-03
Payer: MEDICARE